# Patient Record
Sex: FEMALE | Race: WHITE | Employment: FULL TIME | URBAN - METROPOLITAN AREA
[De-identification: names, ages, dates, MRNs, and addresses within clinical notes are randomized per-mention and may not be internally consistent; named-entity substitution may affect disease eponyms.]

---

## 2018-05-15 ENCOUNTER — TRANSFERRED RECORDS (OUTPATIENT)
Dept: HEALTH INFORMATION MANAGEMENT | Facility: CLINIC | Age: 23
End: 2018-05-15

## 2018-05-15 LAB — PAP SMEAR - HIM PATIENT REPORTED: NEGATIVE

## 2020-04-11 ENCOUNTER — HOSPITAL ENCOUNTER (EMERGENCY)
Facility: CLINIC | Age: 25
Discharge: HOME | End: 2020-04-11
Attending: EMERGENCY MEDICINE | Admitting: EMERGENCY MEDICINE

## 2020-04-11 VITALS
WEIGHT: 140 LBS | OXYGEN SATURATION: 96 % | HEART RATE: 87 BPM | DIASTOLIC BLOOD PRESSURE: 76 MMHG | TEMPERATURE: 98.3 F | RESPIRATION RATE: 16 BRPM | SYSTOLIC BLOOD PRESSURE: 107 MMHG

## 2020-04-11 DIAGNOSIS — N10 ACUTE PYELONEPHRITIS: ICD-10-CM

## 2020-04-11 LAB
ALBUMIN UR-MCNC: 20 MG/DL
ANION GAP SERPL CALCULATED.3IONS-SCNC: 4 MMOL/L (ref 3–14)
APPEARANCE UR: ABNORMAL
B-HCG FREE SERPL-ACNC: <5 IU/L
BACTERIA #/AREA URNS HPF: ABNORMAL /HPF
BASOPHILS # BLD AUTO: 0 10E9/L (ref 0–0.2)
BASOPHILS NFR BLD AUTO: 0.2 %
BILIRUB UR QL STRIP: NEGATIVE
BUN SERPL-MCNC: 13 MG/DL (ref 7–30)
CALCIUM SERPL-MCNC: 8.5 MG/DL (ref 8.5–10.1)
CHLORIDE SERPL-SCNC: 108 MMOL/L (ref 94–109)
CO2 SERPL-SCNC: 25 MMOL/L (ref 20–32)
COLOR UR AUTO: YELLOW
CREAT SERPL-MCNC: 0.74 MG/DL (ref 0.52–1.04)
DIFFERENTIAL METHOD BLD: ABNORMAL
EOSINOPHIL # BLD AUTO: 0 10E9/L (ref 0–0.7)
EOSINOPHIL NFR BLD AUTO: 0.1 %
ERYTHROCYTE [DISTWIDTH] IN BLOOD BY AUTOMATED COUNT: 12 % (ref 10–15)
GFR SERPL CREATININE-BSD FRML MDRD: >90 ML/MIN/{1.73_M2}
GLUCOSE SERPL-MCNC: 119 MG/DL (ref 70–99)
GLUCOSE UR STRIP-MCNC: NEGATIVE MG/DL
HCT VFR BLD AUTO: 38.8 % (ref 35–47)
HGB BLD-MCNC: 12.8 G/DL (ref 11.7–15.7)
HGB UR QL STRIP: ABNORMAL
IMM GRANULOCYTES # BLD: 0 10E9/L (ref 0–0.4)
IMM GRANULOCYTES NFR BLD: 0.4 %
KETONES UR STRIP-MCNC: NEGATIVE MG/DL
LEUKOCYTE ESTERASE UR QL STRIP: ABNORMAL
LYMPHOCYTES # BLD AUTO: 0.7 10E9/L (ref 0.8–5.3)
LYMPHOCYTES NFR BLD AUTO: 7.8 %
MCH RBC QN AUTO: 32.1 PG (ref 26.5–33)
MCHC RBC AUTO-ENTMCNC: 33 G/DL (ref 31.5–36.5)
MCV RBC AUTO: 97 FL (ref 78–100)
MONOCYTES # BLD AUTO: 1 10E9/L (ref 0–1.3)
MONOCYTES NFR BLD AUTO: 11.8 %
MUCOUS THREADS #/AREA URNS LPF: PRESENT /LPF
NEUTROPHILS # BLD AUTO: 6.8 10E9/L (ref 1.6–8.3)
NEUTROPHILS NFR BLD AUTO: 79.7 %
NITRATE UR QL: NEGATIVE
NRBC # BLD AUTO: 0 10*3/UL
NRBC BLD AUTO-RTO: 0 /100
PH UR STRIP: 5.5 PH (ref 5–7)
PLATELET # BLD AUTO: 205 10E9/L (ref 150–450)
POTASSIUM SERPL-SCNC: 4.1 MMOL/L (ref 3.4–5.3)
RBC # BLD AUTO: 3.99 10E12/L (ref 3.8–5.2)
RBC #/AREA URNS AUTO: 23 /HPF (ref 0–2)
SODIUM SERPL-SCNC: 137 MMOL/L (ref 133–144)
SOURCE: ABNORMAL
SP GR UR STRIP: 1.02 (ref 1–1.03)
SQUAMOUS #/AREA URNS AUTO: 1 /HPF (ref 0–1)
TRANS CELLS #/AREA URNS HPF: 8 /HPF (ref 0–1)
UROBILINOGEN UR STRIP-MCNC: 2 MG/DL (ref 0–2)
WBC # BLD AUTO: 8.5 10E9/L (ref 4–11)
WBC #/AREA URNS AUTO: 138 /HPF (ref 0–5)

## 2020-04-11 PROCEDURE — 87086 URINE CULTURE/COLONY COUNT: CPT | Performed by: EMERGENCY MEDICINE

## 2020-04-11 PROCEDURE — 80048 BASIC METABOLIC PNL TOTAL CA: CPT | Performed by: EMERGENCY MEDICINE

## 2020-04-11 PROCEDURE — 99283 EMERGENCY DEPT VISIT LOW MDM: CPT

## 2020-04-11 PROCEDURE — 81001 URINALYSIS AUTO W/SCOPE: CPT | Performed by: EMERGENCY MEDICINE

## 2020-04-11 PROCEDURE — 85025 COMPLETE CBC W/AUTO DIFF WBC: CPT | Performed by: EMERGENCY MEDICINE

## 2020-04-11 PROCEDURE — 87186 SC STD MICRODIL/AGAR DIL: CPT | Performed by: EMERGENCY MEDICINE

## 2020-04-11 PROCEDURE — 84702 CHORIONIC GONADOTROPIN TEST: CPT

## 2020-04-11 PROCEDURE — 87088 URINE BACTERIA CULTURE: CPT | Performed by: EMERGENCY MEDICINE

## 2020-04-11 RX ORDER — CEPHALEXIN 500 MG/1
500 CAPSULE ORAL 4 TIMES DAILY
Qty: 28 CAPSULE | Refills: 0 | Status: SHIPPED | OUTPATIENT
Start: 2020-04-11 | End: 2020-04-18

## 2020-04-11 ASSESSMENT — ENCOUNTER SYMPTOMS
CHILLS: 1
DYSURIA: 0
ABDOMINAL PAIN: 0
FLANK PAIN: 1
SHORTNESS OF BREATH: 0
FEVER: 0
COUGH: 0
FREQUENCY: 1
VOMITING: 0
NAUSEA: 1

## 2020-04-11 NOTE — ED AVS SNAPSHOT
[facility] Emergency [facility]  [address]  [facility] [address]  Phone:  [phone]  Fax:  [phone]                                    Pooja High   MRN: [id number]    Department:  [facility] Emergency Department   Date of Visit:  4/11/2020           After Visit Summary Signature Page    I have received my discharge instructions, and my questions have been answered. I have discussed any challenges I see with this plan with the nurse or doctor.    ..........................................................................................................................................  Patient/Patient Representative Signature      ..........................................................................................................................................  Patient Representative Print Name and Relationship to Patient    ..................................................               ................................................  Date                                   Time    ..........................................................................................................................................  Reviewed by Signature/Title    ...................................................              ..............................................  Date                                               Time          22EPIC Rev 08/18

## 2020-04-12 NOTE — ED TRIAGE NOTES
2-3 days of R flank pain. Today started feeling chilled and slight nausea, no vomiting. Temp 99. No dysuria but having some frequency. Had a kidney infection about 9 years and feels similar. Denies abdominal pain, SOB, cough, fever.
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
## 2020-04-13 LAB
BACTERIA SPEC CULT: ABNORMAL
Lab: ABNORMAL
SPECIMEN SOURCE: ABNORMAL

## 2020-04-15 ENCOUNTER — TELEPHONE (OUTPATIENT)
Dept: EMERGENCY MEDICINE | Facility: CLINIC | Age: 25
End: 2020-04-15

## 2020-04-15 NOTE — TELEPHONE ENCOUNTER
[facility] Emergency Department/Urgent Care Lab result notification [Positive for uti and bacteria is susceptible to antibiotic]:    Reason for call:   Notify of Final urine culture result, confirm patient is taking antibiotic, assess symptoms, and advise per Emergency Dept/Urgent Care discharge instructions and Emergency Dept urine culture protocol.    Lab Result & Date of Final Report [copied from Result Note]:    Final Urine Culture Report on 4/13/2020   Emergency Dept/Urgent Care discharge antibiotic prescribed: Cephalexin (Keflex) 500 mg capsule, 1 capsule (500 mg) by mouth 4 times daily for 7 days.   #1. Bacteria, 50,000 to 100,000 colonies/mL Escherichia coli , is SUSCEPTIBLE to Antibiotic.     As per [facility] ED Lab Result protocol, no change in antibiotic therapy.       Left voicemail message requesting a call back to [phone] between 10 a.m. and 6:30 p.m. for patient's ED/UC lab results.     Frieda Cevallos RN    [facility]   Lung Nodule and ED Lab Results F/U RN  Epic pool (ED late result f/u RN) : [phone] [id number]   # [phone]

## 2020-09-22 ENCOUNTER — OFFICE VISIT (OUTPATIENT)
Dept: FAMILY MEDICINE | Facility: CLINIC | Age: 25
End: 2020-09-22

## 2020-09-22 VITALS
OXYGEN SATURATION: 99 % | HEART RATE: 75 BPM | BODY MASS INDEX: 28.89 KG/M2 | TEMPERATURE: 98.7 F | WEIGHT: 169.2 LBS | RESPIRATION RATE: 18 BRPM | SYSTOLIC BLOOD PRESSURE: 108 MMHG | HEIGHT: 64 IN | DIASTOLIC BLOOD PRESSURE: 78 MMHG

## 2020-09-22 DIAGNOSIS — F41.1 GAD (GENERALIZED ANXIETY DISORDER): ICD-10-CM

## 2020-09-22 DIAGNOSIS — F33.1 MODERATE EPISODE OF RECURRENT MAJOR DEPRESSIVE DISORDER (H): ICD-10-CM

## 2020-09-22 DIAGNOSIS — D17.30 LIPOMA OF SKIN AND SUBCUTANEOUS TISSUE: ICD-10-CM

## 2020-09-22 DIAGNOSIS — Z01.411 ENCOUNTER FOR GYNECOLOGICAL EXAMINATION WITH ABNORMAL FINDING: Primary | ICD-10-CM

## 2020-09-22 DIAGNOSIS — F41.0 PANIC ATTACK: ICD-10-CM

## 2020-09-22 DIAGNOSIS — Z76.89 HEALTH CARE HOME: ICD-10-CM

## 2020-09-22 DIAGNOSIS — Z71.89 ACP (ADVANCE CARE PLANNING): ICD-10-CM

## 2020-09-22 PROBLEM — F41.9 ANXIETY: Status: ACTIVE | Noted: 2019-10-17

## 2020-09-22 LAB
% GRANULOCYTES: 55.7 %
HCT VFR BLD AUTO: 38.7 % (ref 35–47)
HEMOGLOBIN: 12.8 G/DL (ref 11.7–15.7)
LYMPHOCYTES NFR BLD AUTO: 35.4 %
MCH RBC QN AUTO: 32.7 PG (ref 26–33)
MCHC RBC AUTO-ENTMCNC: 33.1 G/DL (ref 31–36)
MCV RBC AUTO: 98.8 FL (ref 78–100)
MONOCYTES NFR BLD AUTO: 8.9 %
PLATELET COUNT - QUEST: 216 10^9/L (ref 150–375)
RBC # BLD AUTO: 3.92 10*12/L (ref 3.8–5.2)
WBC # BLD AUTO: 6.4 10*9/L (ref 4–11)

## 2020-09-22 PROCEDURE — 85025 COMPLETE CBC W/AUTO DIFF WBC: CPT | Performed by: FAMILY MEDICINE

## 2020-09-22 PROCEDURE — 99213 OFFICE O/P EST LOW 20 MIN: CPT | Mod: 25 | Performed by: FAMILY MEDICINE

## 2020-09-22 PROCEDURE — 99385 PREV VISIT NEW AGE 18-39: CPT | Performed by: FAMILY MEDICINE

## 2020-09-22 PROCEDURE — 36415 COLL VENOUS BLD VENIPUNCTURE: CPT | Performed by: FAMILY MEDICINE

## 2020-09-22 RX ORDER — BUSPIRONE HYDROCHLORIDE 15 MG/1
15 TABLET ORAL 2 TIMES DAILY
Qty: 180 TABLET | Refills: 0 | Status: SHIPPED | OUTPATIENT
Start: 2020-09-22 | End: 2021-07-16

## 2020-09-22 RX ORDER — BUSPIRONE HYDROCHLORIDE 15 MG/1
15 TABLET ORAL 2 TIMES DAILY
COMMUNITY
Start: 2020-02-19 | End: 2020-09-22

## 2020-09-22 RX ORDER — HYDROXYZINE PAMOATE 25 MG/1
25 CAPSULE ORAL AT BEDTIME
COMMUNITY
Start: 2020-07-16 | End: 2020-09-22

## 2020-09-22 RX ORDER — CITALOPRAM HYDROBROMIDE 20 MG/1
20 TABLET ORAL DAILY
COMMUNITY
Start: 2020-09-11 | End: 2020-09-22

## 2020-09-22 RX ORDER — HYDROXYZINE PAMOATE 25 MG/1
25 CAPSULE ORAL AT BEDTIME
Qty: 30 CAPSULE | Refills: 0 | Status: SHIPPED | OUTPATIENT
Start: 2020-09-22 | End: 2021-05-11

## 2020-09-22 RX ORDER — CITALOPRAM HYDROBROMIDE 20 MG/1
20 TABLET ORAL DAILY
Qty: 90 TABLET | Refills: 0 | Status: SHIPPED | OUTPATIENT
Start: 2020-09-22 | End: 2021-04-12

## 2020-09-22 SDOH — HEALTH STABILITY: MENTAL HEALTH: HOW OFTEN DO YOU HAVE A DRINK CONTAINING ALCOHOL?: 4 OR MORE TIMES A WEEK

## 2020-09-22 SDOH — HEALTH STABILITY: MENTAL HEALTH: HOW MANY STANDARD DRINKS CONTAINING ALCOHOL DO YOU HAVE ON A TYPICAL DAY?: 1 OR 2

## 2020-09-22 SDOH — HEALTH STABILITY: MENTAL HEALTH: HOW OFTEN DO YOU HAVE 6 OR MORE DRINKS ON ONE OCCASION?: LESS THAN MONTHLY

## 2020-09-22 ASSESSMENT — ANXIETY QUESTIONNAIRES
IF YOU CHECKED OFF ANY PROBLEMS ON THIS QUESTIONNAIRE, HOW DIFFICULT HAVE THESE PROBLEMS MADE IT FOR YOU TO DO YOUR WORK, TAKE CARE OF THINGS AT HOME, OR GET ALONG WITH OTHER PEOPLE: VERY DIFFICULT
5. BEING SO RESTLESS THAT IT IS HARD TO SIT STILL: MORE THAN HALF THE DAYS
6. BECOMING EASILY ANNOYED OR IRRITABLE: MORE THAN HALF THE DAYS
GAD7 TOTAL SCORE: 14
3. WORRYING TOO MUCH ABOUT DIFFERENT THINGS: MORE THAN HALF THE DAYS
7. FEELING AFRAID AS IF SOMETHING AWFUL MIGHT HAPPEN: MORE THAN HALF THE DAYS
1. FEELING NERVOUS, ANXIOUS, OR ON EDGE: MORE THAN HALF THE DAYS
2. NOT BEING ABLE TO STOP OR CONTROL WORRYING: MORE THAN HALF THE DAYS

## 2020-09-22 ASSESSMENT — PATIENT HEALTH QUESTIONNAIRE - PHQ9
SUM OF ALL RESPONSES TO PHQ QUESTIONS 1-9: 13
5. POOR APPETITE OR OVEREATING: MORE THAN HALF THE DAYS

## 2020-09-22 ASSESSMENT — MIFFLIN-ST. JEOR: SCORE: 1479.55

## 2020-09-22 NOTE — NURSING NOTE
Patient is here for a full physical exam and establish care.    Pre-Visit Screening :  Immunizations : up to date  Colon Screening : NA  Mammogram: NA  Asthma Action Test/Plan : NA  PHQ9 :  Done today  GAD7 :  Done today  Patient's  BMI Body mass index is 29.5 kg/m .  Questioned patient about current smoking habits.  Pt. has never smoked.  OK to leave a detailed voice message regarding today's visit Yes, phone # [phone]      ETOH screening: addressed in history

## 2020-09-22 NOTE — PATIENT INSTRUCTIONS
Read handout  Schedule surgical consultation        Exercise encouraged  Flu shot recommended  Hemoglobin obtained  Referral to Psychiatry  Refills given  Routine breast self-exam encouraged  Seat belt use encouraged  Sunscreen recommended
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
## 2020-09-23 ASSESSMENT — ANXIETY QUESTIONNAIRES: GAD7 TOTAL SCORE: 14

## 2021-03-21 ENCOUNTER — HEALTH MAINTENANCE LETTER (OUTPATIENT)
Age: 26
End: 2021-03-21

## 2021-03-29 ENCOUNTER — OFFICE VISIT (OUTPATIENT)
Dept: INTERNAL MEDICINE | Facility: CLINIC | Age: 26
End: 2021-03-29
Payer: COMMERCIAL

## 2021-03-29 VITALS
BODY MASS INDEX: 30.34 KG/M2 | HEIGHT: 64 IN | SYSTOLIC BLOOD PRESSURE: 118 MMHG | TEMPERATURE: 98.7 F | RESPIRATION RATE: 18 BRPM | WEIGHT: 177.7 LBS | HEART RATE: 101 BPM | OXYGEN SATURATION: 99 % | DIASTOLIC BLOOD PRESSURE: 79 MMHG

## 2021-03-29 DIAGNOSIS — F33.1 MODERATE EPISODE OF RECURRENT MAJOR DEPRESSIVE DISORDER (H): ICD-10-CM

## 2021-03-29 DIAGNOSIS — F41.1 GAD (GENERALIZED ANXIETY DISORDER): ICD-10-CM

## 2021-03-29 PROCEDURE — 99203 OFFICE O/P NEW LOW 30 MIN: CPT | Performed by: INTERNAL MEDICINE

## 2021-03-29 RX ORDER — MULTIPLE VITAMINS W/ MINERALS TAB 9MG-400MCG
1 TAB ORAL DAILY
COMMUNITY

## 2021-03-29 RX ORDER — CITALOPRAM HYDROBROMIDE 40 MG/1
40 TABLET ORAL DAILY
Qty: 30 TABLET | Refills: 1 | Status: SHIPPED | OUTPATIENT
Start: 2021-03-29 | End: 2021-05-11

## 2021-03-29 ASSESSMENT — ANXIETY QUESTIONNAIRES
6. BECOMING EASILY ANNOYED OR IRRITABLE: NEARLY EVERY DAY
7. FEELING AFRAID AS IF SOMETHING AWFUL MIGHT HAPPEN: MORE THAN HALF THE DAYS
2. NOT BEING ABLE TO STOP OR CONTROL WORRYING: NEARLY EVERY DAY
IF YOU CHECKED OFF ANY PROBLEMS ON THIS QUESTIONNAIRE, HOW DIFFICULT HAVE THESE PROBLEMS MADE IT FOR YOU TO DO YOUR WORK, TAKE CARE OF THINGS AT HOME, OR GET ALONG WITH OTHER PEOPLE: SOMEWHAT DIFFICULT
GAD7 TOTAL SCORE: 16
1. FEELING NERVOUS, ANXIOUS, OR ON EDGE: NEARLY EVERY DAY
3. WORRYING TOO MUCH ABOUT DIFFERENT THINGS: MORE THAN HALF THE DAYS
5. BEING SO RESTLESS THAT IT IS HARD TO SIT STILL: SEVERAL DAYS

## 2021-03-29 ASSESSMENT — MIFFLIN-ST. JEOR: SCORE: 1518.1

## 2021-03-29 ASSESSMENT — PATIENT HEALTH QUESTIONNAIRE - PHQ9
5. POOR APPETITE OR OVEREATING: MORE THAN HALF THE DAYS
SUM OF ALL RESPONSES TO PHQ QUESTIONS 1-9: 18

## 2021-03-29 NOTE — PROGRESS NOTES
Assessment & Plan     Moderate episode of recurrent major depressive disorder (H)  She has been on a couple different medications without optimal results.  Recommend referral to our psychiatric nurse practitioner to get her medications managed better, for now we will refill her current medication so she avoids withdrawal.  I will also refer for counseling.  She will call if any significant worsening in symptoms.  - MENTAL HEALTH REFERRAL  - Adult; Psychiatry; Psychiatry; [facility]/Bridge to Long-Term Psychiatry as indicated ([phone]); Yes; Several Medications tried and failed; Yes; We will contact you to schedule the a...  - citalopram (CELEXA) 40 MG tablet; Take 1 tablet (40 mg) by mouth daily    ALICE (generalized anxiety disorder)  As above  - MENTAL HEALTH REFERRAL  - Adult; Psychiatry; Psychiatry; G: [facility]/Bridge to Long-Term Psychiatry as indicated ([phone]); Yes; Several Medications tried and failed; Yes; We will contact you to schedule the a...  - citalopram (CELEXA) 40 MG tablet; Take 1 tablet (40 mg) by mouth daily      30 minutes spent on the date of the encounter doing chart review, history and exam, documentation and further activities as noted above including contacting the clinic therapist to arrange follow-up therapy         Depression Screening Follow Up    PHQ 3/29/2021   PHQ-9 Total Score 18   Q9: Thoughts of better off dead/self-harm past 2 weeks Several days             Return in about 3 months (around 6/29/2021) for Physical Exam.    Jessica Crews MD  [facility]    Emilee Patton is a 25 year old who presents for the following health issues     HPI     She is a new patient who presents for establishment of care for her depression and anxiety.  She has had mood issues for many many years.  This was first diagnosed in 2011, was treated with Wellbutrin.  Initially she did well but then  she has a very flat affect with it.  She went off it for pregnancy.  She was put on citalopram a little bit over a year ago, the dose was increased last fall.  She still feels significant depression and anxiety symptoms despite the increased dose.  She is also on BuSpar.  She takes hydroxyzine at night and occasionally during the day for bad anxiety attacks.  She has a lot of sleep problems, hard to get to sleep as well as awakening during the night and it is hard to get back to sleep.  She gets very restless when sleeping and does have some restless leg symptoms.  She expressed some concerns that she occasionally feels if she would be better off dead but does not feel she would do it and hurt herself.    She does not have other health problems or concerns today.  She has a Mirena in place for about 2 years.  She had her last Pap in about May or June 2018    Patient Active Problem List   Diagnosis     Anxiety     Moderate episode of recurrent major depressive disorder (H)     ACP (advance care planning)     Health Care Home     ALICE (generalized anxiety disorder)     Panic attack     Current Outpatient Medications   Medication Sig Dispense Refill     busPIRone (BUSPAR) 15 MG tablet Take 1 tablet (15 mg) by mouth 2 times daily 180 tablet 0     Cholecalciferol (VITAMIN D-3 PO)        citalopram (CELEXA) 20 MG tablet Take 1 tablet (20 mg) by mouth daily (Patient taking differently: Take 40 mg by mouth daily ) 90 tablet 0     citalopram (CELEXA) 40 MG tablet Take 1 tablet (40 mg) by mouth daily 30 tablet 1     Fexofenadine HCl (ALLERGY 24-HR PO)        hydrOXYzine (VISTARIL) 25 MG capsule Take 1 capsule (25 mg) by mouth At Bedtime 30 capsule 0     levonorgestrel (MIRENA) 20 MCG/24HR IUD 1 Device by Intrauterine route       Meclizine HCl (ANTIVERT PO)        multivitamin w/minerals (MULTI-VITAMIN) tablet Take 1 tablet by mouth daily          Review of Systems   negative      Objective    /79 (BP Location: Right arm,  "Patient Position: Sitting, Cuff Size: Adult Regular)   Pulse 101   Temp 98.7  F (37.1  C) (Oral)   Resp 18   Ht 1.613 m (5' 3.5\")   Wt 80.6 kg (177 lb 11.2 oz)   SpO2 99%   BMI 30.98 kg/m    Body mass index is 30.98 kg/m .  Physical Exam       PHQ 9/22/2020 3/29/2021   PHQ-9 Total Score 13 18   Q9: Thoughts of better off dead/self-harm past 2 weeks Not at all Several days     ALICE-7 SCORE 9/22/2020 3/29/2021   Total Score 14 16                   "

## 2021-03-29 NOTE — Clinical Note
Please abstract the following data from this visit with this patient into the appropriate field in Epic:    Tests that can be patient reported without a hard copy:    Pap smear done on this date 5/15/18 (approximately), by this group: [facility], results were normal.

## 2021-03-30 ASSESSMENT — ANXIETY QUESTIONNAIRES: GAD7 TOTAL SCORE: 16

## 2021-04-12 ENCOUNTER — TELEPHONE (OUTPATIENT)
Dept: BEHAVIORAL HEALTH | Facility: CLINIC | Age: 26
End: 2021-04-12

## 2021-04-12 ENCOUNTER — VIRTUAL VISIT (OUTPATIENT)
Dept: PSYCHOLOGY | Facility: CLINIC | Age: 26
End: 2021-04-12
Payer: COMMERCIAL

## 2021-04-12 ENCOUNTER — VIRTUAL VISIT (OUTPATIENT)
Dept: PSYCHIATRY | Facility: CLINIC | Age: 26
End: 2021-04-12
Attending: INTERNAL MEDICINE
Payer: COMMERCIAL

## 2021-04-12 DIAGNOSIS — F41.1 GAD (GENERALIZED ANXIETY DISORDER): ICD-10-CM

## 2021-04-12 DIAGNOSIS — F41.0 PANIC ATTACK: ICD-10-CM

## 2021-04-12 DIAGNOSIS — Z86.59 HX OF MAJOR DEPRESSION: ICD-10-CM

## 2021-04-12 DIAGNOSIS — R41.840 ATTENTION DEFICIT: Primary | ICD-10-CM

## 2021-04-12 DIAGNOSIS — F33.1 MODERATE EPISODE OF RECURRENT MAJOR DEPRESSIVE DISORDER (H): ICD-10-CM

## 2021-04-12 DIAGNOSIS — F41.9 ANXIETY: Primary | ICD-10-CM

## 2021-04-12 PROCEDURE — 99204 OFFICE O/P NEW MOD 45 MIN: CPT | Mod: GT | Performed by: PSYCHIATRY & NEUROLOGY

## 2021-04-12 PROCEDURE — 90791 PSYCH DIAGNOSTIC EVALUATION: CPT | Mod: GT | Performed by: PSYCHOLOGIST

## 2021-04-12 RX ORDER — BUPROPION HYDROCHLORIDE 100 MG/1
100 TABLET, EXTENDED RELEASE ORAL DAILY
Qty: 30 TABLET | Refills: 1 | Status: SHIPPED | OUTPATIENT
Start: 2021-04-12 | End: 2021-05-11

## 2021-04-12 SDOH — SOCIAL STABILITY: SOCIAL INSECURITY
WITHIN THE LAST YEAR, HAVE YOU BEEN KICKED, HIT, SLAPPED, OR OTHERWISE PHYSICALLY HURT BY YOUR PARTNER OR EX-PARTNER?: NO

## 2021-04-12 SDOH — SOCIAL STABILITY: SOCIAL INSECURITY: WITHIN THE LAST YEAR, HAVE YOU BEEN HUMILIATED OR EMOTIONALLY ABUSED IN OTHER WAYS BY YOUR PARTNER OR EX-PARTNER?: NO

## 2021-04-12 SDOH — SOCIAL STABILITY: SOCIAL NETWORK: HOW OFTEN DO YOU ATTEND CHURCH OR RELIGIOUS SERVICES?: NOT ASKED

## 2021-04-12 SDOH — SOCIAL STABILITY: SOCIAL NETWORK: IN A TYPICAL WEEK, HOW MANY TIMES DO YOU TALK ON THE PHONE WITH FAMILY, FRIENDS, OR NEIGHBORS?: NOT ASKED

## 2021-04-12 SDOH — SOCIAL STABILITY: SOCIAL NETWORK: HOW OFTEN DO YOU ATTENT MEETINGS OF THE CLUB OR ORGANIZATION YOU BELONG TO?: NOT ASKED

## 2021-04-12 SDOH — SOCIAL STABILITY: SOCIAL INSECURITY: WITHIN THE LAST YEAR, HAVE YOU BEEN AFRAID OF YOUR PARTNER OR EX-PARTNER?: NO

## 2021-04-12 SDOH — HEALTH STABILITY: PHYSICAL HEALTH: ON AVERAGE, HOW MANY MINUTES DO YOU ENGAGE IN EXERCISE AT THIS LEVEL?: 0 MIN

## 2021-04-12 SDOH — SOCIAL STABILITY: SOCIAL NETWORK: HOW OFTEN DO YOU GET TOGETHER WITH FRIENDS OR RELATIVES?: NOT ASKED

## 2021-04-12 SDOH — ECONOMIC STABILITY: FOOD INSECURITY: WITHIN THE PAST 12 MONTHS, THE FOOD YOU BOUGHT JUST DIDN'T LAST AND YOU DIDN'T HAVE MONEY TO GET MORE.: NEVER TRUE

## 2021-04-12 SDOH — SOCIAL STABILITY: SOCIAL NETWORK
DO YOU BELONG TO ANY CLUBS OR ORGANIZATIONS SUCH AS CHURCH GROUPS UNIONS, FRATERNAL OR ATHLETIC GROUPS, OR SCHOOL GROUPS?: NOT ASKED

## 2021-04-12 SDOH — SOCIAL STABILITY: SOCIAL NETWORK: ARE YOU MARRIED, WIDOWED, DIVORCED, SEPARATED, NEVER MARRIED, OR LIVING WITH A PARTNER?: DIVORCED

## 2021-04-12 SDOH — ECONOMIC STABILITY: FOOD INSECURITY: WITHIN THE PAST 12 MONTHS, YOU WORRIED THAT YOUR FOOD WOULD RUN OUT BEFORE YOU GOT MONEY TO BUY MORE.: NEVER TRUE

## 2021-04-12 SDOH — ECONOMIC STABILITY: TRANSPORTATION INSECURITY
IN THE PAST 12 MONTHS, HAS LACK OF TRANSPORTATION KEPT YOU FROM MEETINGS, WORK, OR FROM GETTING THINGS NEEDED FOR DAILY LIVING?: NO

## 2021-04-12 SDOH — ECONOMIC STABILITY: INCOME INSECURITY: HOW HARD IS IT FOR YOU TO PAY FOR THE VERY BASICS LIKE FOOD, HOUSING, MEDICAL CARE, AND HEATING?: NOT HARD AT ALL

## 2021-04-12 SDOH — SOCIAL STABILITY: SOCIAL INSECURITY
WITHIN THE LAST YEAR, HAVE TO BEEN RAPED OR FORCED TO HAVE ANY KIND OF SEXUAL ACTIVITY BY YOUR PARTNER OR EX-PARTNER?: NO

## 2021-04-12 SDOH — HEALTH STABILITY: MENTAL HEALTH
STRESS IS WHEN SOMEONE FEELS TENSE, NERVOUS, ANXIOUS, OR CAN'T SLEEP AT NIGHT BECAUSE THEIR MIND IS TROUBLED. HOW STRESSED ARE YOU?: NOT ASKED

## 2021-04-12 SDOH — ECONOMIC STABILITY: TRANSPORTATION INSECURITY
IN THE PAST 12 MONTHS, HAS THE LACK OF TRANSPORTATION KEPT YOU FROM MEDICAL APPOINTMENTS OR FROM GETTING MEDICATIONS?: NO

## 2021-04-12 SDOH — HEALTH STABILITY: PHYSICAL HEALTH: ON AVERAGE, HOW MANY DAYS PER WEEK DO YOU ENGAGE IN MODERATE TO STRENUOUS EXERCISE (LIKE A BRISK WALK)?: 0 DAYS

## 2021-04-12 SDOH — HEALTH STABILITY: MENTAL HEALTH: HOW OFTEN DO YOU HAVE 6 OR MORE DRINKS ON ONE OCCASION?: NEVER

## 2021-04-12 ASSESSMENT — COLUMBIA-SUICIDE SEVERITY RATING SCALE - C-SSRS
5. HAVE YOU STARTED TO WORK OUT OR WORKED OUT THE DETAILS OF HOW TO KILL YOURSELF? DO YOU INTEND TO CARRY OUT THIS PLAN?: YES
4. HAVE YOU HAD THESE THOUGHTS AND HAD SOME INTENTION OF ACTING ON THEM?: NO
TOTAL  NUMBER OF INTERRUPTED ATTEMPTS PAST 3 MONTHS: NO
3. HAVE YOU BEEN THINKING ABOUT HOW YOU MIGHT KILL YOURSELF?: YES
4. HAVE YOU HAD THESE THOUGHTS AND HAD SOME INTENTION OF ACTING ON THEM?: NO
2. HAVE YOU ACTUALLY HAD ANY THOUGHTS OF KILLING YOURSELF LIFETIME?: YES
1. IN YOUR LIFETIME, HAVE YOU WISHED YOU WERE DEAD OR WISHED YOU COULD GO TO SLEEP AND NOT WAKE UP?: 2013
TOTAL  NUMBER OF ABORTED OR SELF INTERRUPTED ATTEMPTS PAST LIFETIME: NO
6. HAVE YOU EVER DONE ANYTHING, STARTED TO DO ANYTHING, OR PREPARED TO DO ANYTHING TO END YOUR LIFE?: NO
TOTAL  NUMBER OF INTERRUPTED ATTEMPTS PAST 3 MONTHS: 1
5. HAVE YOU STARTED TO WORK OUT OR WORKED OUT THE DETAILS OF HOW TO KILL YOURSELF? DO YOU INTEND TO CARRY OUT THIS PLAN?: NO
1. IN THE PAST MONTH, HAVE YOU WISHED YOU WERE DEAD OR WISHED YOU COULD GO TO SLEEP AND NOT WAKE UP?: NO
TOTAL  NUMBER OF INTERRUPTED ATTEMPTS LIFETIME: YES
1. IN THE PAST MONTH, HAVE YOU WISHED YOU WERE DEAD OR WISHED YOU COULD GO TO SLEEP AND NOT WAKE UP?: YES
6. HAVE YOU EVER DONE ANYTHING, STARTED TO DO ANYTHING, OR PREPARED TO DO ANYTHING TO END YOUR LIFE?: NO
ATTEMPT PAST THREE MONTHS: NO
ATTEMPT LIFETIME: NO
TOTAL  NUMBER OF ABORTED OR SELF INTERRUPTED ATTEMPTS PAST 3 MONTHS: NO
2. HAVE YOU ACTUALLY HAD ANY THOUGHTS OF KILLING YOURSELF?: NO

## 2021-04-12 ASSESSMENT — ANXIETY QUESTIONNAIRES
2. NOT BEING ABLE TO STOP OR CONTROL WORRYING: NEARLY EVERY DAY
5. BEING SO RESTLESS THAT IT IS HARD TO SIT STILL: MORE THAN HALF THE DAYS
7. FEELING AFRAID AS IF SOMETHING AWFUL MIGHT HAPPEN: SEVERAL DAYS
1. FEELING NERVOUS, ANXIOUS, OR ON EDGE: NEARLY EVERY DAY
GAD7 TOTAL SCORE: 17
4. TROUBLE RELAXING: NEARLY EVERY DAY
7. FEELING AFRAID AS IF SOMETHING AWFUL MIGHT HAPPEN: SEVERAL DAYS
6. BECOMING EASILY ANNOYED OR IRRITABLE: MORE THAN HALF THE DAYS
GAD7 TOTAL SCORE: 17
3. WORRYING TOO MUCH ABOUT DIFFERENT THINGS: NEARLY EVERY DAY
GAD7 TOTAL SCORE: 17
7. FEELING AFRAID AS IF SOMETHING AWFUL MIGHT HAPPEN: SEVERAL DAYS
2. NOT BEING ABLE TO STOP OR CONTROL WORRYING: NEARLY EVERY DAY
7. FEELING AFRAID AS IF SOMETHING AWFUL MIGHT HAPPEN: SEVERAL DAYS
5. BEING SO RESTLESS THAT IT IS HARD TO SIT STILL: MORE THAN HALF THE DAYS
GAD7 TOTAL SCORE: 17
GAD7 TOTAL SCORE: 17
1. FEELING NERVOUS, ANXIOUS, OR ON EDGE: NEARLY EVERY DAY
3. WORRYING TOO MUCH ABOUT DIFFERENT THINGS: NEARLY EVERY DAY
GAD7 TOTAL SCORE: 17
6. BECOMING EASILY ANNOYED OR IRRITABLE: MORE THAN HALF THE DAYS
4. TROUBLE RELAXING: NEARLY EVERY DAY

## 2021-04-12 ASSESSMENT — PATIENT HEALTH QUESTIONNAIRE - PHQ9
10. IF YOU CHECKED OFF ANY PROBLEMS, HOW DIFFICULT HAVE THESE PROBLEMS MADE IT FOR YOU TO DO YOUR WORK, TAKE CARE OF THINGS AT HOME, OR GET ALONG WITH OTHER PEOPLE: SOMEWHAT DIFFICULT
SUM OF ALL RESPONSES TO PHQ QUESTIONS 1-9: 18

## 2021-04-12 NOTE — Clinical Note
Please call this patient to get them scheduled for a follow-up visit in 4 weeks. Please schedule with me and the [facility]. Thanks!
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
## 2021-04-12 NOTE — PATIENT INSTRUCTIONS
Name: Pooja High  YOB: 1995  Date: April 12, 2021   My primary care provider: Jordana Ac  My primary care clinic: [facility]  My prescriber: Dr. Frederick  Other care team support:     My Triggers:  Relationship conflict hearing about other people's family problems and Work  difficulties       Additional People, Places, and Things that I can access for support: ex-, friend Dede         What is important to me and makes life worth living: son, kids at work.         GREEN    Good Control  1. I feel good  2. No suicidal thoughts   3. Can work, sleep and play      Action Steps  1. Self-care: balanced meals, exercising, sleep practices, etc.  2. Take your medications as prescribed.  3. Continue meetings with therapist and prescriber.  4.  Do the healthy things that I enjoy.           YELLOW  Getting Worse  I have ANY of these:  1. I do not feel good  2. Difficulty Concentrating  3. Sleep is changing  4. Increase/Change in my thoughts to hurt self and/or others, but I can still manage and not act on it.   5. Not taking care of self.             Action Steps (in addition to the above):  1. Inform your therapist and psychiatric prescriber/PCP.  2. Keep taking your medications as prescribed.    3. Turn to people you can ask for help.  4. Use internal coping strategies -see below.  5. Create safe environment: notify friends/family of increase in symptoms           RED  Get Help  If I have ANY of these:  1. Current and uncontrollable thoughts and/or behaviors to hurt self and/or others.   Actions to manage my safety  1. Contact your emergency person Dao or Dede  2. Call my crisis team- [facility] [phone]  3. Or Call 911 or go to the emergency room right away        My Internal Coping Strategies include the following:  belly breathing, color, use my coping skills and take a nap, take a walk     [End for Brief Safety Plan]     Safety Concerns  How To Identify Situations  That Make Your Mental Health Worse:  Triggers are things that make your mental health worse.  Look at the list below to help you find your triggers and what you can do about them.     1. Identify Early Warning Signs:    Sometimes symptoms return, even when people do their best to stay well. Symptoms can develop over a short period of time with little or no warning, but most of the time they emerge gradually over several weeks.  Early warning signs are changes that people experience when a relapse is starting. Some early warning signs are common and others are not as common.   Common Early Warning Signs:    Feeling tense or nervous, Eating less or eating more, Trouble sleeping -either too much or too little sleep, Feeling depressed or low, Feeling irritable, Feeling like not being around other people, Trouble concentrating and Urges to harm self     2. Identify action steps to take when warning signs are noticed:    Taking Action- It is important to take action if you are experiencing early warning signs of a relapse.  The faster you act, the more likely it is that you can avoid a full relapse.  It is helpful to identify several specific ways to cope with symptoms.      The following is my list of symptoms and coping strategies that I can use when they are present:    Symptom Coping Strategies   Anxiety -Talk with someone in your support system and let him or her know how you are feeling.  -Use relaxation techniques such as deep breathing or imagery.  -Use positive affirmations to counteract negative self-talk such as  I am learning to let go of worry.    Depression - Schedule your day; include activities you have to do and activities you enjoy doing.  - Get some exercise - walk, run, bike, or swim.  - Give yourself credit for even the smallest things you get done.   Sleep Difficulties   - Go to sleep at the same time every day.  - Do something relaxing before bed, such as drinking herbal tea or listening to  music.  - Avoid having discussions about upsetting topics before going to bed.   Delusions   - Distract yourself from the disturbing thought by doing something that requires your attention such as a puzzle.  - Check out your beliefs by talking to someone you trust.    Hallucinations   - Use headphones to listen to music.  - Tell voices to  stop  or say to yourself,  I am safe.   - Ignore the hallucinations as much as possible; focus on other things.   Concentration Difficulties - Minimize distractions so there is only one thing for you to focus on at a time.    - Ask the person you are having a conversation with to slow down or repeat things you are unsure of.

## 2021-04-12 NOTE — TELEPHONE ENCOUNTER
Left message for patient about needing to reschedule today's appointment. We believes that insurance will likely not cover both visits with myself and John today. Offered her Wednesday at 2pm gave her intake number to reschedule. John states that she is safe and safety plan is complete.
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
## 2021-04-12 NOTE — Clinical Note
Just FYI. No action needed.  Let me know if you have any questions or concerns.    -[facility]

## 2021-04-13 ASSESSMENT — PATIENT HEALTH QUESTIONNAIRE - PHQ9: SUM OF ALL RESPONSES TO PHQ QUESTIONS 1-9: 18

## 2021-04-13 ASSESSMENT — ANXIETY QUESTIONNAIRES
GAD7 TOTAL SCORE: 17
GAD7 TOTAL SCORE: 17

## 2021-04-14 ENCOUNTER — VIRTUAL VISIT (OUTPATIENT)
Dept: BEHAVIORAL HEALTH | Facility: CLINIC | Age: 26
End: 2021-04-14
Payer: COMMERCIAL

## 2021-04-14 DIAGNOSIS — F33.1 MODERATE EPISODE OF RECURRENT MAJOR DEPRESSIVE DISORDER (H): ICD-10-CM

## 2021-04-14 DIAGNOSIS — F41.1 GAD (GENERALIZED ANXIETY DISORDER): Primary | ICD-10-CM

## 2021-04-14 DIAGNOSIS — F41.0 PANIC DISORDER WITHOUT AGORAPHOBIA: ICD-10-CM

## 2021-04-14 PROCEDURE — 90837 PSYTX W PT 60 MINUTES: CPT | Mod: GT | Performed by: MARRIAGE & FAMILY THERAPIST

## 2021-04-14 PROCEDURE — 90785 PSYTX COMPLEX INTERACTIVE: CPT | Mod: GT | Performed by: MARRIAGE & FAMILY THERAPIST

## 2021-04-14 ASSESSMENT — PATIENT HEALTH QUESTIONNAIRE - PHQ9
10. IF YOU CHECKED OFF ANY PROBLEMS, HOW DIFFICULT HAVE THESE PROBLEMS MADE IT FOR YOU TO DO YOUR WORK, TAKE CARE OF THINGS AT HOME, OR GET ALONG WITH OTHER PEOPLE: SOMEWHAT DIFFICULT
SUM OF ALL RESPONSES TO PHQ QUESTIONS 1-9: 18
SUM OF ALL RESPONSES TO PHQ QUESTIONS 1-9: 18

## 2021-04-14 NOTE — PROGRESS NOTES
[facility] Primary Care: Integrated Behavioral Health  April 14, 2021  Telemedicine Visit: The patient's condition can be safely assessed and treated via synchronous audio and visual telemedicine encounter.      Reason for Telemedicine Visit: Services only offered telehealth    Originating Site (Patient Location): Patient's place of employment    Distant Site (Provider Location): [facility] [place]    Consent:  The patient/guardian has verbally consented to: the potential risks and benefits of telemedicine (video visit) versus in person care; bill my insurance or make self-payment for services provided; and responsibility for payment of non-covered services.     Mode of Communication:  Video Conference via [employer]    As the provider I attest to compliance with applicable laws and regulations related to telemedicine.      Behavioral Health Clinician Progress Note    Patient Name: Pooja High           Service Type:  Individual      Service Location:   Face to Face in Home / Community     Session Start Time: 2:00 pm  Session End Time: 3:00pm      Session Length: 53 - 60      Attendees: Client    Visit Activities (Refresh list every visit): [facility] Only    Diagnostic Assessment Date: John Corona on 4/12/21  Treatment Plan Review Date: n/a  See Flowsheets for today's PHQ-9 and ALICE-7 results  Previous PHQ-9:   PHQ-9 SCORE 4/12/2021 4/12/2021 4/14/2021   PHQ-9 Total Score MyChart 18 (Moderately severe depression) 18 (Moderately severe depression) 18 (Moderately severe depression)   PHQ-9 Total Score 18 18 18     Previous ALICE-7:   ALICE-7 SCORE 4/12/2021 4/12/2021 4/12/2021   Total Score 17 (severe anxiety) 17 (severe anxiety) 17 (severe anxiety)   Total Score 17 17 17       TOM LEVEL:  No flowsheet data found.    DATA  Extended Session (60+ minutes): No  Interactive Complexity: No  -The need to manage maladaptive communication (related to, e.g., high anxiety, high reactivity, repeated  questions, or disagreement) among participants that complicates delivery of care  Crisis: Yes, visit entailed Crisis Management / Stabilization requiring urgent assessment and history of the crisis state, mental status exam and disposition  [facility] Patient: No    Treatment Objective(s) Addressed in This Session:  Target Behavior(s): n/a    Depressed Mood: Increase interest, engagement, and pleasure in doing things  Decrease frequency and intensity of feeling down, depressed, hopeless  Improve quantity and quality of night time sleep / decrease daytime naps  Feel less tired and more energy during the day   Improve diet, appetite, mindful eating, and / or meal planning  Identify negative self-talk and behaviors: challenge core beliefs, myths, and actions  Improve concentration, focus, and mindfulness in daily activities   Feel less fidgety, restless or slow in daily activities / interpersonal interactions  Decrease thoughts that you'd be better off dead or of suicide / self-harm  Discuss motivation / ambivalence about taking anti-depressant / mood stabilizer medication(s)  Discuss motivation / ambivalence about a referral to specialty mental health services (Therapy, Day Tx, PHP)  Anxiety: will experience a reduction in anxiety, will develop more effective coping skills to manage anxiety symptoms, will develop healthy cognitive patterns and beliefs and will increase ability to function adaptively  Risk / Safety: will develop strategies for more effective management of risk issues and will follow safety plan (in EMR) for more effective management of risk issues    Current Stressors / Issues:  Pt was seen by John Corona on 4/12/21. Reviewed Dr. Corona's DA and screenings. Reviewed safety with Patient. Patient denies any current SI,plans or intentions. Patient does have self-harm thoughts. She has been scratching until she is bleeding. This has happened 4 times in the last few months. She states that she catches herself  "scratching every other day. She states that she has cut her nails and had been able to stop herself more. Pt has a history of suicidal thoughts in 2013. She had thoughts about slitting her wrist in 2013.  Pt told her friend and went to the hospital. Pt denied suicidal thoughts since then. \"I am safe. I am not having any current suicidal thoughts. Pt states that she can reach out to her friend Dede or her ex- Dao if anything changes. Pt states that she loves her job and her son. Pt is looking forward to her upcoming move. Patient agreed to make a safety plan. States that she can follow the safety plan. There are no guns in her home. Denies any drug or alcohol abuse.    Talked more about the scratching. Pt states that it started out as a nervous habits then the first time felt pain realized it was a release. \"It is not that I want to hurt my self. It is not feeling suicidal either. It is a release.\" Patient states that it is michelle better the last few days and hopes the medication increase will help. Patient states that she doesn't want to scratch and is hoping that therapy will help with her anxiety. SHe is hopeful that when her anxiety is down this will stop the scratching. Pt reported she scratches only when she is feeling overwhelmed. Normalized the self harm thought and talked about things to try when she wants to scratch. Will try Rubberband, changing body tempeture apps given to her in her safety plan.     Discussed items making patient anxious are: struggles at work and helping her son. They are working to help her son with school. He is getting tested for ADHD,Anxiety and/ or Autism. Patient is also trying to move due to neighbor struggles. Discussed CBT and ACTS therapy. Patient would also like to learn relaxation techniques. Roya made another appointment       Progress on Treatment Objective(s) / Homework:  n/a    Motivational Interviewing    MI Intervention: Expressed Empathy/Understanding, " Supported Autonomy, Collaboration, Evocation, Permission to raise concern or advise, Open-ended questions and Reflections: simple and complex     Change Talk Expressed by the Patient: Desire to change Ability to change Reasons to change    Provider Response to Change Talk: E - Evoked more info from patient about behavior change      Situation        Automatic Thoughts  Cognitive Distortions      Feelings        Behavior        Questioning Thoughts            Also provided psychoeducation about behavioral health condition, symptoms, and treatment options    Care Plan review completed: No    Medication Review:  Changes to psychiatric medications, see updated Medication List in EPIC.     Medication Compliance:  Yes    Changes in Health Issues:   None reported    Chemical Use Review:   Substance Use: Chemical use reviewed, no active concerns identified      Tobacco Use: No current tobacco use.      Assessment: Current Emotional / Mental Status (status of significant symptoms):  Risk status (Self / Other harm or suicidal ideation)  Patient has had a history of suicidal ideation: 2013 thoughts of slitting wrist and self-injurious behavior: scratching  Patient denies current fears or concerns for personal safety.  Patient denies current or recent suicidal ideation or behaviors.  Patient denies current or recent homicidal ideation or behaviors.  Patient reports current or recent self injurious behavior or ideation including scratching.  Patient denies other safety concerns.  A safety and risk management plan has been developed including: Patient consented to co-developed safety plan.  A safety and risk management plan was completed.  Patient agreed to use safety plan should any safety concerns arise.  A copy was given to the patient.  [place] Suicide Severity Rating Scale (Lifetime/Recent)  [place] Suicide Severity Rating (Lifetime/Recent) 4/12/2021   1. Wish to be Dead (Lifetime) Yes   Wish to be Dead Description  (Lifetime) 2013   1. Wish to be Dead (Recent) No   2. Non-Specific Active Suicidal Thoughts (Lifetime) Yes   2. Non-Specific Active Suicidal Thoughts (Recent) No   3. Active Suicidal Ideation with any Methods (Not Plan) Without Intent to Act (Lifetime) Yes   3. Active Suicidal Ideation with any Methods (Not Plan) Without Intent to Act (Recent) No   4. Active Suicidal Ideation with Some Intent to Act, Without Specific Plan (Lifetime) No   4. Active Suicidal Ideation with Some Intent to Act, Without Specific Plan (Recent) No   5. Active Suicidal Ideation with Specific Plan and Intent (Lifetime) Yes   Active Suicidal Ideation with Specific Plan and Intent Description (Lifetime) 2013 - had plan to slit wrists; told friend who took her to ER   5. Active Suicidal Ideation with Specific Plan and Intent (Recent) No   Most Severe Ideation Rating (Lifetime) 4   Frequency (Lifetime) 1   Duration (Lifetime) 3   Controllability (Lifetime) 4   Protective Factors  (Lifetime) 4   Most Severe Ideation Rating (Past Month) NA   Frequency (Past Month) NA   Duration (Past Month) NA   Controllability (Past Month) NA   Protective Factors (Past Month) NA   Reasons for Ideation (Past Month) NA   Actual Attempt (Lifetime) No   Actual Attempt (Past 3 Months) No   Has subject engaged in non-suicidal self-injurious behavior? (Lifetime) No   Has subject engaged in non-suicidal self-injurious behavior? (Past 3 Months) Yes   Interrupted Attempts (Lifetime) Yes   Interrupted Attempt Description (Lifetime) 2013 - had plan to slit wrists; told friend who took her to ER   Total Number of Interrupted Attempts (Lifetime) 1   Interrupted Attempts (Past 3 Months) No   Aborted or Self-Interrupted Attempt (Lifetime) No   Aborted or Self-Interrupted Attempt (Past 3 Months) No   Preparatory Acts or Behavior (Lifetime) No   Preparatory Acts or Behavior (Past 3 Months) No   Most Lethal Attempt Actual Lethality Code NA   Initial/First Attempt Actual Lethality  "Code NA       Appearance:   Appropriate   Eye Contact:   Good   Psychomotor Behavior: Normal   Attitude:   Cooperative   Orientation:   All  Speech   Rate / Production: Normal    Volume:  Normal   Mood:    Anxious   Affect:    Appropriate   Thought Content:  Clear   Thought Form:  Coherent  Logical   Insight:    Fair     Diagnoses:  1. ALICE (generalized anxiety disorder)    2. Moderate episode of recurrent major depressive disorder (H)    3. Panic disorder without agoraphobia        Collateral Reports Completed:  Communicated with: John Corona    Plan: (Homework, other):  Patient was given information about behavioral services and encouraged to schedule a follow up appointment with the clinic [facility] in 1 week, in 2 weeks.  She was also given information about mental health symptoms and treatment options  and Cognitive Behavioral Therapy skills to practice when experiencing anxiety and depression.  CD Recommendations: No indications of CD issues.  NEISHA Umanzor, [facility]      ______________________________________________________________________        Integrated Behavioral Health Services                                      Patient's Name: Pooja High  April 14, 2021    SAFETY PLAN:  Step 1: Warning signs / cues (Thoughts, images, mood, situation, behavior) that a crisis may be developing:    Thoughts: \"I don't matter\" and If I was not here it would not matter    Images: denied    Thinking Processes: ruminations (can't stop thinking about my problems): work , racing thoughts and highly critical and negative thoughts: about herself    Mood: worsening depression and intense worry    Behaviors: isolating/withdrawing , not taking care of myself, not sleeping enough and hard getting tasks done    Situations: relationship problems and hearing about others family members problems, and Work difficulties   Step 2: Coping strategies - Things I can do to take my mind off of my problems without contacting another person " "(relaxation technique, physical activity):    Distress Tolerance Strategies:  arts and crafts: coloring books, watch a funny movie: netflix, change body temperature (ice pack/cold water) , paced breathing/progressive muscle relaxation and Apps- Calm, Headspace and Calm  Try a rubber band on wrist.     Physical Activities: go for a walk, deep breathing and stretching     Focus on helpful thoughts:  remind myself of what is important to me: my son  Step 3: People and social settings that provide distraction:   Name: Dede  Phone: in her phone   Name: Dao Phone: in her [employer]   work and [employer]   Step 4: Remind myself of people and things that are important to me and worth living for:    My son and my kids at my job    Step 5: When I am in crisis, I can ask these people to help me use my safety plan:   Name: Dede   Phone: in phone   Name: Dao Phone: in phone     Step 6: Making the environment safe:     be around others  Step 7: Professionals or agencies I can contact during a crisis:    Suicide Prevention Lifeline: 2-879-120-KVNP (5603)    Crisis Text Line Service: Text   [place]  to 810093.  Local Crisis Services: If you are in immediate danger, call 911.  If you or someone you know is experiencing a mental health crisis and you need help, the following crisis  hotlines are available to help.    Suicide Prevention Lifeline: 2-827-602-RGXF (0283)    Crisis Text Line Service: Text \"[place]\" to 192266.    [facility] Emergency Department  Behavioral Emergency Center  [facility] [address]  [phone]  [facility]  [facility] Crisis Response Services  (Adults & Children)  [phone]  [facility] -  Acute Psychiatric Services (APS)  Assessment & Referral: [phone]  Suicide Hotline: 346.831.5078    [facility] Crisis Team  [phone]    [facility] Adult Mental Health " Services   [phone]  Referrals: [phone]  Crisis: [phone]    [facility]    Emergency Department  [address] [place] [address]  [phone]  [place] [employer]    4-837-EzvcGtm ([phone])  [facility] Crisis Response Unit    [phone]  [facility]  Community Outreach for Psychiatric Emergencies  [phone]  [facility] Crisis Services  [facility] Adult Mental Health Services - Crisis Services (24/7)  Information & Referrals: [phone]  Crisis Line: [phone]    [facility] (24/7)  [phone] TDD    Call 911 or go to my nearest emergency department.   I helped develop this safety plan and agree to use it when needed.  I have been given a copy of this plan.      Patient signature: ____verbally agreed. Sent by Summu[employer] Render__________________________________________________________  Today s date:  April 14, 2021  Adapted from Safety Plan Template 2008 Jess Bedoya and Cornelio Alvarez is reprinted with the express permission of the authors.  No portion of the Safety Plan Template may be reproduced without the express, written permission.  You can contact the authors at [email or web address] or [email or web address].

## 2021-04-14 NOTE — PATIENT INSTRUCTIONS
Treatment Plan:    Continue Celexa/citalopram 40 mg daily for mood and anxiety    Continue buspirone/BuSpar 15 mg twice daily for anxiety    Continue hydroxyzine 25-50 up to 3 times daily as needed for anxiety, sleep.    Start Wellbutrin  mg daily for mood and to augment Celexa/citalopram.  Might also be beneficial for focus/attention.    Referral placed for ADHD psychological testing.    Continue therapy as planned.    Continue all other cares per primary care provider.     Continue all other medications as reviewed per electronic medical record today.     Safety plan reviewed. To the Emergency Department as needed or call after hours crisis line at [phone] or [phone]. [place] Crisis Text Line: Text [place] to 943575  or  Suicide LifeLine Chat: [email or web address]/chat    Schedule an appointment with me in 4 weeks or sooner as needed.  Call [facility] at [phone] to schedule.    Follow up with primary care provider as planned or sooner if needed for acute medical concerns.    Call the psychiatric nurse line with medication questions or concerns at [phone].    MadBid.comt may be used to communicate with your provider, but this is not intended to be used for emergencies.    Patient Education:  Discussed therapy with Wellbutrin can lower the seizure threshold.  Patient does have history of concussion but no seizures, no eating disorders, does not use excessive alcohol.    Community Resources:    National Suicide Prevention Lifeline: 515.191.5603 (TTY: 959.353.7078). Call anytime for help.  (www.suicidepreventionlifeline.org)  National [place] on Mental Illness (www.miller.org): 936.595.5401 or 026-432-7027.   Mental Health Association (www.mentalhealth.org): 460.596.5916 or 036-867-7017.  [place] Crisis Text Line: Text [place] to 376045  Suicide LifeLine Chat: [employer][email or web address]/chat

## 2021-04-29 ENCOUNTER — VIRTUAL VISIT (OUTPATIENT)
Dept: BEHAVIORAL HEALTH | Facility: CLINIC | Age: 26
End: 2021-04-29
Payer: COMMERCIAL

## 2021-04-29 DIAGNOSIS — F41.0 PANIC DISORDER WITHOUT AGORAPHOBIA: ICD-10-CM

## 2021-04-29 DIAGNOSIS — F33.1 MODERATE EPISODE OF RECURRENT MAJOR DEPRESSIVE DISORDER (H): ICD-10-CM

## 2021-04-29 DIAGNOSIS — F41.1 GAD (GENERALIZED ANXIETY DISORDER): Primary | ICD-10-CM

## 2021-04-29 PROCEDURE — 90834 PSYTX W PT 45 MINUTES: CPT | Mod: GT | Performed by: MARRIAGE & FAMILY THERAPIST

## 2021-04-29 NOTE — PROGRESS NOTES
[facility]: Integrated Behavioral Health  April 29, 2021  Telemedicine Visit: The patient's condition can be safely assessed and treated via synchronous audio and visual telemedicine encounter.      Reason for Telemedicine Visit: Services only offered telehealth    Originating Site (Patient Location): Patient's place of employment    Distant Site (Provider Location): [facility]    Consent:  The patient/guardian has verbally consented to: the potential risks and benefits of telemedicine (video visit) versus in person care; bill my insurance or make self-payment for services provided; and responsibility for payment of non-covered services.     Mode of Communication:  Video Conference via [employer]    As the provider I attest to compliance with applicable laws and regulations related to telemedicine.      Behavioral Health Clinician Progress Note    Patient Name: Pooja High           Service Type:  Individual      Service Location:   Face to Face in Home / Community     Session Start Time: 1:06 pm  Session End Time: 1:58pm      Session Length: 38 - 52      Attendees: Client    Visit Activities (Refresh list every visit): [facility] Only    Diagnostic Assessment Date: John Corona on 4/12/21  Treatment Plan Review Date: n/a  See Flowsheets for today's PHQ-9 and ALICE-7 results  Previous PHQ-9:   PHQ-9 SCORE 4/12/2021 4/12/2021 4/14/2021   PHQ-9 Total Score MyChart 18 (Moderately severe depression) 18 (Moderately severe depression) 18 (Moderately severe depression)   PHQ-9 Total Score 18 18 18     Previous ALICE-7:   ALICE-7 SCORE 4/12/2021 4/12/2021 4/12/2021   Total Score 17 (severe anxiety) 17 (severe anxiety) 17 (severe anxiety)   Total Score 17 17 17       TOM LEVEL:  No flowsheet data found.    DATA  Extended Session (60+ minutes): No  Interactive Complexity: No  -The need to manage maladaptive communication (related to, e.g., high anxiety, high reactivity, repeated  "questions, or disagreement) among participants that complicates delivery of care  Crisis: Yes, visit entailed Crisis Management / Stabilization requiring urgent assessment and history of the crisis state, mental status exam and disposition  [facility] Patient: No    Treatment Objective(s) Addressed in This Session:  Target Behavior(s): n/a    Depressed Mood: Increase interest, engagement, and pleasure in doing things  Decrease frequency and intensity of feeling down, depressed, hopeless  Improve quantity and quality of night time sleep / decrease daytime naps  Feel less tired and more energy during the day   Improve diet, appetite, mindful eating, and / or meal planning  Identify negative self-talk and behaviors: challenge core beliefs, myths, and actions  Improve concentration, focus, and mindfulness in daily activities   Feel less fidgety, restless or slow in daily activities / interpersonal interactions  Decrease thoughts that you'd be better off dead or of suicide / self-harm  Discuss motivation / ambivalence about taking anti-depressant / mood stabilizer medication(s)  Discuss motivation / ambivalence about a referral to specialty mental health services (Therapy, Day Tx, PHP)  Anxiety: will experience a reduction in anxiety, will develop more effective coping skills to manage anxiety symptoms, will develop healthy cognitive patterns and beliefs and will increase ability to function adaptively  Risk / Safety: will develop strategies for more effective management of risk issues and will follow safety plan (in EMR) for more effective management of risk issues    Current Stressors / Issues:      Talked to patient about bridging and next level of care. Discussed ACT, DBT and CBT therapy. Helped patient get set up for DBT with a long term therapist.   Will bridge until then.  Reviewed safety with Patient. Patient denies any current SI,plans or intentions.\" No, no thoughts at all.\"  Patient is still scratching but states " "less. States that the rubber band is helping \"It is differently curving the right way.\" Has her safety plan. Talked about a mix up with her apartment and worked on CBT skills to slow things down and look at the positives.     Appointment Date: 6/8/2021  Appointment Time: 3:00 PM  Location: [employer]  Mariama Tong, [facility]  [address]  [address]  [place] [address]  [phone]      Progress on Treatment Objective(s) / Homework:  n/a    Motivational Interviewing    MI Intervention: Expressed Empathy/Understanding, Supported Autonomy, Collaboration, Evocation, Permission to raise concern or advise, Open-ended questions and Reflections: simple and complex     Change Talk Expressed by the Patient: Desire to change Ability to change Reasons to change    Provider Response to Change Talk: E - Evoked more info from patient about behavior change      Situation        Automatic Thoughts  Cognitive Distortions      Feelings        Behavior        Questioning Thoughts            Also provided psychoeducation about behavioral health condition, symptoms, and treatment options    Care Plan review completed: No    Medication Review:  Changes to psychiatric medications, see updated Medication List in EPIC.     Medication Compliance:  Yes    Changes in Health Issues:   None reported    Chemical Use Review:   Substance Use: Chemical use reviewed, no active concerns identified      Tobacco Use: No current tobacco use.      Assessment: Current Emotional / Mental Status (status of significant symptoms):  Risk status (Self / Other harm or suicidal ideation)  Patient has had a history of suicidal ideation: 2013 thoughts of slitting wrist and self-injurious behavior: scratching  Patient denies current fears or concerns for personal safety.  Patient denies current or recent suicidal ideation or behaviors.  Patient denies current or recent homicidal ideation or " behaviors.  Patient reports current or recent self injurious behavior or ideation including scratching.  Patient denies other safety concerns.  A safety and risk management plan has been developed including: Patient consented to co-developed safety plan.  A safety and risk management plan was completed.  Patient agreed to use safety plan should any safety concerns arise.  A copy was given to the patient.  [place] Suicide Severity Rating Scale (Lifetime/Recent)  [place] Suicide Severity Rating (Lifetime/Recent) 4/12/2021   1. Wish to be Dead (Lifetime) Yes   Wish to be Dead Description (Lifetime) 2013   1. Wish to be Dead (Recent) No   2. Non-Specific Active Suicidal Thoughts (Lifetime) Yes   2. Non-Specific Active Suicidal Thoughts (Recent) No   3. Active Suicidal Ideation with any Methods (Not Plan) Without Intent to Act (Lifetime) Yes   3. Active Suicidal Ideation with any Methods (Not Plan) Without Intent to Act (Recent) No   4. Active Suicidal Ideation with Some Intent to Act, Without Specific Plan (Lifetime) No   4. Active Suicidal Ideation with Some Intent to Act, Without Specific Plan (Recent) No   5. Active Suicidal Ideation with Specific Plan and Intent (Lifetime) Yes   Active Suicidal Ideation with Specific Plan and Intent Description (Lifetime) 2013 - had plan to slit wrists; told friend who took her to ER   5. Active Suicidal Ideation with Specific Plan and Intent (Recent) No   Most Severe Ideation Rating (Lifetime) 4   Frequency (Lifetime) 1   Duration (Lifetime) 3   Controllability (Lifetime) 4   Protective Factors  (Lifetime) 4   Most Severe Ideation Rating (Past Month) NA   Frequency (Past Month) NA   Duration (Past Month) NA   Controllability (Past Month) NA   Protective Factors (Past Month) NA   Reasons for Ideation (Past Month) NA   Actual Attempt (Lifetime) No   Actual Attempt (Past 3 Months) No   Has subject engaged in non-suicidal self-injurious behavior? (Lifetime) No   Has subject engaged  "in non-suicidal self-injurious behavior? (Past 3 Months) Yes   Interrupted Attempts (Lifetime) Yes   Interrupted Attempt Description (Lifetime) 2013 - had plan to slit wrists; told friend who took her to ER   Total Number of Interrupted Attempts (Lifetime) 1   Interrupted Attempts (Past 3 Months) No   Aborted or Self-Interrupted Attempt (Lifetime) No   Aborted or Self-Interrupted Attempt (Past 3 Months) No   Preparatory Acts or Behavior (Lifetime) No   Preparatory Acts or Behavior (Past 3 Months) No   Most Lethal Attempt Actual Lethality Code NA   Initial/First Attempt Actual Lethality Code NA       Appearance:   Appropriate   Eye Contact:   Good   Psychomotor Behavior: Normal   Attitude:   Cooperative   Orientation:   All  Speech   Rate / Production: Normal    Volume:  Normal   Mood:    Anxious   Affect:    Appropriate   Thought Content:  Clear   Thought Form:  Coherent  Logical   Insight:    Fair     Diagnoses:  1. ALICE (generalized anxiety disorder)    2. Moderate episode of recurrent major depressive disorder (H)    3. Panic disorder without agoraphobia        Collateral Reports Completed:  Communicated with: John Corona and Dr. Frederick     Plan: (Homework, other):  Patient was given information about behavioral services and encouraged to schedule a follow up appointment with the clinic [facility] in 1 week, in 2 weeks.  She was also given information about mental health symptoms and treatment options  and Cognitive Behavioral Therapy skills to practice when experiencing anxiety and depression.  CD Recommendations: No indications of CD issues.  NEISHA Umanzor, [facility]      ______________________________________________________________________        Integrated Behavioral Health Services                                      Patient's Name: Pooja High  April 14, 2021    SAFETY PLAN:  Step 1: Warning signs / cues (Thoughts, images, mood, situation, behavior) that a crisis may be developing:    Thoughts: \"I don't matter\" " and If I was not here it would not matter    Images: denied    Thinking Processes: ruminations (can't stop thinking about my problems): work , racing thoughts and highly critical and negative thoughts: about herself    Mood: worsening depression and intense worry    Behaviors: isolating/withdrawing , not taking care of myself, not sleeping enough and hard getting tasks done    Situations: relationship problems and hearing about others family members problems, and Work difficulties   Step 2: Coping strategies - Things I can do to take my mind off of my problems without contacting another person (relaxation technique, physical activity):    Distress Tolerance Strategies:  arts and crafts: coloring books, watch a funny movie: netflix, change body temperature (ice pack/cold water) , paced breathing/progressive muscle relaxation and Apps- Calm, Headspace and Calm  Try a rubber band on wrist.     Physical Activities: go for a walk, deep breathing and stretching     Focus on helpful thoughts:  remind myself of what is important to me: my son  Step 3: People and social settings that provide distraction:   Name: Dede  Phone: in her phone   Name: Dao Phone: in her [employer]   work and [employer]   Step 4: Remind myself of people and things that are important to me and worth living for:    My son and my kids at my job    Step 5: When I am in crisis, I can ask these people to help me use my safety plan:   Name: Dede   Phone: in phone   Name: Dao Phone: in phone     Step 6: Making the environment safe:     be around others  Step 7: Professionals or agencies I can contact during a crisis:    Suicide Prevention Lifeline: 2-670-007-TALK (6175)    Crisis Text Line Service: Text   [place]  to 170620.  Local Crisis Services: If you are in immediate danger, call 781.  If you or someone you know is experiencing a mental health crisis and you need help, the following crisis  hotlines are available to help.    Suicide Prevention Lifeline:  "7-991-106-TALK (9561)    Crisis Text Line Service: Text \"[place]\" to 185731.    [facility]    [place] [facility]  Behavioral Emergency Center  [address] [place] [place] [place] [address]  [phone]  [facility] Crisis Response Services  (Adults & Children)  [phone]  [facility] -  Acute Psychiatric Services (APS)  Assessment & Referral: [phone]  Suicide Hotline: 805.360.7846    [facility] Crisis Team  [phone]    [facility] Adult Mental Health Services   [phone]  Referrals: [phone]  Crisis: [phone]    [facility]    Emergency Department  [address] [place] [address]  [phone]  [place] [employer]    6-639-BllpNbv (1-788.369.7261)  [facility] Crisis Response Unit    [phone]  [facility]  Community Outreach for Psychiatric Emergencies  [phone]  [facility] Crisis Services  [facility] Adult Mental Health Services - Crisis Services (24/7)  Information & Referrals: [phone]  Crisis Line: [phone]    [facility] Emergency Center (24/7)  [phone] TDD    Call 911 or go to my nearest emergency department.   I helped develop this safety plan and agree to use it when needed.  I have been given a copy of this plan.      Patient signature: ____verbally agreed. Sent by [employer]__________________________________________________________  Today s date:  April 14, 2021  Adapted from Safety Plan Template 2008 Jess Bedoya and Cornelio Alvarez is reprinted with the express permission of the authors.  No portion of the Safety Plan Template may be reproduced without the express, written permission.  You can contact the authors at s@[facility], located within [facility] or [email or web address].          "

## 2021-04-29 NOTE — Clinical Note
Just  wanted to give you both an update. She agreed to long term therapy. We decided to try a therapist that does DBT. It is set for June. I can bridge until then. Her scratching is down and the rubber band has helped. She wanted to know more about ACT therapy. I told her John is her man and I would let you know so you can talk to her about it.  Thanks,  Brenna

## 2021-05-11 ENCOUNTER — VIRTUAL VISIT (OUTPATIENT)
Dept: PSYCHOLOGY | Facility: CLINIC | Age: 26
End: 2021-05-11
Payer: COMMERCIAL

## 2021-05-11 ENCOUNTER — VIRTUAL VISIT (OUTPATIENT)
Dept: PSYCHIATRY | Facility: CLINIC | Age: 26
End: 2021-05-11
Payer: COMMERCIAL

## 2021-05-11 DIAGNOSIS — F41.9 ANXIETY: Primary | ICD-10-CM

## 2021-05-11 DIAGNOSIS — F41.0 PANIC ATTACK: ICD-10-CM

## 2021-05-11 DIAGNOSIS — F41.1 GAD (GENERALIZED ANXIETY DISORDER): ICD-10-CM

## 2021-05-11 DIAGNOSIS — R41.840 ATTENTION DEFICIT: Primary | ICD-10-CM

## 2021-05-11 DIAGNOSIS — Z86.59 HX OF MAJOR DEPRESSION: ICD-10-CM

## 2021-05-11 DIAGNOSIS — F33.1 MODERATE EPISODE OF RECURRENT MAJOR DEPRESSIVE DISORDER (H): ICD-10-CM

## 2021-05-11 DIAGNOSIS — G47.00 INSOMNIA, UNSPECIFIED TYPE: ICD-10-CM

## 2021-05-11 PROCEDURE — 99214 OFFICE O/P EST MOD 30 MIN: CPT | Mod: GT | Performed by: PSYCHIATRY & NEUROLOGY

## 2021-05-11 PROCEDURE — 90832 PSYTX W PT 30 MINUTES: CPT | Mod: 95 | Performed by: PSYCHOLOGIST

## 2021-05-11 RX ORDER — HYDROXYZINE PAMOATE 25 MG/1
25-50 CAPSULE ORAL
Qty: 60 CAPSULE | Refills: 1 | Status: SHIPPED | OUTPATIENT
Start: 2021-05-11 | End: 2021-12-31

## 2021-05-11 RX ORDER — BUPROPION HYDROCHLORIDE 100 MG/1
100 TABLET, EXTENDED RELEASE ORAL 2 TIMES DAILY
Qty: 60 TABLET | Refills: 1 | Status: SHIPPED | OUTPATIENT
Start: 2021-05-11 | End: 2021-07-16

## 2021-05-11 RX ORDER — CITALOPRAM HYDROBROMIDE 20 MG/1
40 TABLET ORAL DAILY
Qty: 30 TABLET | Refills: 1 | Status: SHIPPED | OUTPATIENT
Start: 2021-05-11 | End: 2021-07-16 | Stop reason: DRUGHIGH

## 2021-05-11 NOTE — PROGRESS NOTES
Collaborative Care Psychiatry Service ([facility]PS)  May 11, 2021      Behavioral Health Clinician Progress Note    Patient Name: Pooja High      Telemedicine Visit: The patient's condition can be safely assessed and treated via synchronous audio and visual telemedicine encounter.      Reason for Telemedicine Visit: Services only offered telehealth    Originating Site (Patient Location): Patient's home    Distant Site (Provider Location): Provider Remote Setting    Consent:  The patient/guardian has verbally consented to: the potential risks and benefits of telemedicine (video visit) versus in person care; bill my insurance or make self-payment for services provided; and responsibility for payment of non-covered services.     Mode of Communication:  Video Conference via [employer]    As the provider I attest to compliance with applicable laws and regulations related to telemedicine.         Service Type:  Individual      Session Start Time: 08:45am  Session End Time: 09:10am      Session Length: 16 - 37      Attendees: Patient    Visit Activities (Refresh list every visit): [facility] Only    Diagnostic Assessment Date: 04/12/2021  See Flowsheets for today's PHQ-9 and ALICE-7 results  Previous PHQ-9:   PHQ-9 SCORE 4/12/2021 4/12/2021 4/14/2021   PHQ-9 Total Score [facility] 18 (Moderately severe depression) 18 (Moderately severe depression) 18 (Moderately severe depression)   PHQ-9 Total Score 18 18 18       Previous ALICE-7:   ALICE-7 SCORE 4/12/2021 4/12/2021 4/12/2021   Total Score 17 (severe anxiety) 17 (severe anxiety) 17 (severe anxiety)   Total Score 17 17 17       WHODAS  WHODAS 2.0 Total Score 4/12/2021 4/14/2021   Total Score 26 26   Total Score MyChart 26 26        AUDIT  AUDIT - Alcohol Use Disorders Identification Test - Reproduced from the World Health Organization Audit 2001 (Second Edition) 4/12/2021   1.  How often do you have a drink containing alcohol? 2 to 3 times a week   2.  How many drinks containing alcohol do  "you have on a typical day when you are drinking? 1 or 2   3.  How often do you have five or more drinks on one occasion? Never   4.  How often during the last year have you found that you were not able to stop drinking once you had started? Never   5.  How often during the last year have you failed to do what was normally expected of you because of drinking? Never   6.  How often during the last year have you needed a first drink in the morning to get yourself going after a heavy drinking session? Never   7.  How often during the last year have you had a feeling of guilt or remorse after drinking? Never   8.  How often during the last year have you been unable to remember what happened the night before because of your drinking? Never   9.  Have you or someone else been injured because of your drinking? No   10. Has a relative, friend, doctor or other health care worker been concerned about your drinking or suggested you cut down? No   TOTAL SCORE 3       DATA  Extended Session (60+ minutes): No  Interactive Complexity: No  Crisis: No    Medication Compliance:  Yes      Chemical Use Review:   Substance Use: Problem use continues with no change since last session, Stage of Change: Pre-contemplation        Tobacco Use: No current tobacco use.      Current Stressors / Issues:  Feels that wellbutrin has been helping. \"I'm more focused when I'm doing tasks.\" She noted that she feels more fatigued during the day \"and it's hard to keep my eyes open.\" She noted that it is odd because despite the fatigue she is more focused and able to accomplish more tasks. She continues to have difficulty falling to sleep but stays asleep. She has been getting 5-7 hours per night. The move to her apartment was delayed but is happening soon.       Review of Symptoms per patient report:  Depression:     Change in sleep, Lack of interest, Change in energy level, Difficulties concentrating, Irritability, Feeling sad, down, or depressed, " Withdrawn, Frequent crying, Anger outbursts and Self-injurious behavior  Radha:             No Symptoms  Psychosis:       No Symptoms  Anxiety:           Excessive worry, Nervousness, Physical complaints, such as headaches, stomachaches, muscle tension, Social anxiety, Sleep disturbance, Ruminations, Poor concentration, Irritability and Anger outbursts  Panic:              Palpitations, Tremors, Shortness of breath, Tingling, Numbness and Sense of impending doom. Was having them weekly; last one was a year ago.  Post Traumatic Stress Disorder:  No Symptoms   Eating Disorder:          No Symptoms  ADD / ADHD:              No symptoms  Conduct Disorder:       No symptoms  Autism Spectrum Disorder:     No symptoms  Obsessive Compulsive Disorder:       No Symptoms      Changes in Health Issues:   None reported    Assessment: Current Emotional / Mental Status (status of significant symptoms):  Risk status (Self / Other harm or suicidal ideation)  Patient has had a history of suicidal ideation:   and self-injurious behavior:    Patient denies current fears or concerns for personal safety.  Patient denies current or recent suicidal ideation or behaviors.  Patient denies current or recent homicidal ideation or behaviors.  Patient reports current or recent self injurious behavior or ideation including  .  Patient denies other safety concerns.  A safety and risk management plan has been developed including: Patient consented to co-developed safety plan.  A safety and risk management plan was completed.  Patient agreed to use safety plan should any safety concerns arise.  A copy was given to the patient.    Appearance:   Appropriate   Eye Contact:   Good   Psychomotor Behavior: Normal   Attitude:   Cooperative   Orientation:   All  Speech   Rate / Production: Normal    Volume:  Normal   Mood:    Normal  Affect:    Appropriate   Thought Content:  Clear   Thought Form:  Coherent  Logical   Insight:    Good     Diagnoses:  1.  Anxiety    2. Moderate episode of recurrent major depressive disorder (H)    3. Panic attack        Collateral Reports Completed:  Communicated with: Dr. Frederick    Plan: (Homework, other):  Patient was given information about behavioral services and encouraged to schedule a follow up appointment with the clinic [facility] in conjunction with next [facility] appointment.  She was also given information about mental health symptoms and treatment options .  CD Recommendations: No indications of CD issues.      John Corona PsyD, LP      Gagandeep Corona PsyD  May 11, 2021

## 2021-05-11 NOTE — PROGRESS NOTES
"Pooja High is a 25 year old year old who is being evaluated via a billable video visit.      How would you like to obtain your AVS? MyChart  If you are dropped from the video visit, the video invite should be resent to: Text to cell phone: see Epic  Will anyone else be joining your video visit? No       Telemedicine Visit: The patient's condition can be safely assessed and treated via synchronous audio and visual telemedicine encounter.      Reason for Telemedicine Visit: Covid-19 Pandemic    Originating Site (Patient Location): Patient's home     Distant Site (Provider Location): Provider Remote Setting    Mode of Communication:  Video Conference via Mediamind    As the provider I attest to compliance with applicable laws and regulations related to telemedicine.        Outpatient Psychiatric Progress Note    Name: Pooja High   : 1995                    Primary Care Provider: [place] [facility] Yashria   Therapist: Seeing [facility] [place] NEISHA Umanzor; will be starting with a DBT specialist     PHQ-9 scores:  PHQ-9 SCORE 2021   PHQ-9 Total Score MyChart 18 (Moderately severe depression) 18 (Moderately severe depression) 18 (Moderately severe depression)   PHQ-9 Total Score 18 18 18       ALICE-7 scores:  ALICE-7 SCORE 2021   Total Score 17 (severe anxiety) 17 (severe anxiety) 17 (severe anxiety)   Total Score 17 17 17       Patient Identification:  Patient is a 25 year old,   White Not  or  female  who presents for return visit with me.  Patient is currently employed full time. Patient attended the phone/video session alone. Patient prefers to be called: \"Pooja\".    Interim History:  I last saw Pooja High for outpatient psychiatry Consultation on 21. During that appointment, we:      Continue Celexa/citalopram 40 mg daily for mood and anxiety    Continue buspirone/BuSpar 15 mg twice daily for anxiety    Continue hydroxyzine 25-50 " "up to 3 times daily as needed for anxiety, sleep.    Start Wellbutrin  mg daily for mood and to augment Celexa/citalopram.  Might also be beneficial for focus/attention.    Referral placed for ADHD psychological testing.    Continue therapy as planned.    5/11: Overall patient feels like she is doing a little bit better since last visit.  She feels like things are on the right course.  Wellbutrin SR helpful for focus and attention.  It does seem to wear off around lunchtime in the afternoon.  She will feel quite tired and fatigued at this time.  She takes her first dose around 530-6 AM in the morning.  Some trouble falling asleep at night but does stay asleep after she gets to sleep.  Work is actually going quite well.  She is building good relationships with clients.  She is very pleased about this.  She denies safety concerns.  No SI today.  Denies problematic alcohol/drug use.    Per [facility], Dr. John Corona, during today's team-based visit:  Feels that wellbutrin has been helping. \"I'm more focused when I'm doing tasks.\" She noted that she feels more fatigued during the day \"and it's hard to keep my eyes open.\" She noted that it is odd because despite the fatigue she is more focused and able to accomplish more tasks. She continues to have difficulty falling to sleep but stays asleep. She has been getting 5-7 hours per night. The move to her apartment was delayed but is happening soon.     Psychiatric ROS:  Pooja High reports mood has been: improved  Anxiety has been: improved  Sleep has been: some struggles falling asleep  Radha sxs: None  Psychosis sxs: None  ADHD/ADD sxs: Some improved focus/concentration, has ADHD testing in July  PTSD sxs: None  PHQ9 and GAD7 scores were reviewed today if completed.   Medication side effects: Yes: Feels quite fatigued and tired when Wellbutrin SR dose wears off around lunchtime  Current stressors include: Symptoms and See HPI above  Coping mechanisms and supports include: " Therapy, Family, Hobbies and Friends    Current medications include:   Current Outpatient Medications   Medication Sig     buPROPion (WELLBUTRIN SR) 100 MG 12 hr tablet Take 1 tablet (100 mg) by mouth daily     busPIRone (BUSPAR) 15 MG tablet Take 1 tablet (15 mg) by mouth 2 times daily     Cholecalciferol (VITAMIN D-3 PO)      citalopram (CELEXA) 40 MG tablet Take 1 tablet (40 mg) by mouth daily     Fexofenadine HCl (ALLERGY 24-HR PO)      hydrOXYzine (VISTARIL) 25 MG capsule Take 1 capsule (25 mg) by mouth At Bedtime     levonorgestrel (MIRENA) 20 MCG/24HR IUD 1 Device by Intrauterine route     Meclizine HCl (ANTIVERT PO)      multivitamin w/minerals (MULTI-VITAMIN) tablet Take 1 tablet by mouth daily     No current facility-administered medications for this visit.        Past Medical/Surgical History:  Past Medical History:   Diagnosis Date     Anxiety 10/17/2019     Depressive disorder      ALICE (generalized anxiety disorder) 9/22/2020     Moderate episode of recurrent major depressive disorder (H) 9/22/2020     Panic disorder       has a past medical history of Anxiety (10/17/2019), Depressive disorder, ALICE (generalized anxiety disorder) (9/22/2020), Moderate episode of recurrent major depressive disorder (H) (9/22/2020), and Panic disorder.    Social History:  Reviewed. No changes to social history except as noted above in HPI.    Vital Signs:   None. This is phone/video visit.     Labs:  Most recent laboratory results reviewed and no new labs.     Review of Systems:  10 systems (general, cardiovascular, respiratory, eyes, ENT, endocrine, GI, , M/S, neurological) were reviewed. Most pertinent finding(s) is/are: denies fever, cough, headaches, shortness of breath, chest pain, N/V, constipation/diarrhea, trouble urinating, aches and pains. The remaining systems are all unremarkable.    Mental Status Examination (limited as this is by phone/video):  Appearance: Awake, alert, appears stated age, no acute  distress, well-groomed  Attitude:  cooperative, pleasant  Motor: No gross abnormalities observed via video, not formally tested  Oriented to:  person, place, time, and situation  Attention Span and Concentration:  normal  Speech:  clear, coherent, regular rate, rhythm, and volume  Language: intact  Mood:  good  Affect:  appropriate and in normal range, mood congruent and Bright  Associations:  no loose associations  Thought Process:  logical, linear and goal oriented  Thought Content:  no evidence of suicidal ideation or homicidal ideation, no evidence of psychotic thought, no auditory hallucinations present and no visual hallucinations present  Recent and Remote Memory:  Intact to interview. Not formally assessed. No amnesia.  Fund of Knowledge: appropriate  Insight:  good  Judgment:  intact, adequate for safety  Impulse Control:  intact    Suicide Risk Assessment:  Today Pooja High reports no suicidal ideation. Based on all available evidence including the factors cited above, Pooja High does not appear to be at imminent risk for self-harm, does not meet criteria for a 72-hr hold, and therefore remains appropriate for ongoing outpatient level of care.  A thorough assessment of risk factors related to suicide and self-harm have been reviewed and are noted above. The patient convincingly denies suicidality on several occasions. Local community safety resources printed and reviewed for patient to use if needed. There was no deceit detected, and the patient presented in a manner that was believable.     DSM5 Diagnosis:  300.02 (F41.1) Generalized Anxiety Disorder   History major depression  Attention deficit (Rule Out ADHD)  Insomnia, unspecified    Medical comorbidities include:   Patient Active Problem List    Diagnosis Date Noted     Moderate episode of recurrent major depressive disorder (H) 09/22/2020     Priority: Medium     ALICE (generalized anxiety disorder) 09/22/2020     Priority: Medium     Panic  disorder without agoraphobia 09/22/2020     Priority: Medium     Anxiety 10/17/2019     Priority: Medium     ACP (advance care planning) 09/22/2020     Priority: Low     Health Care Home 09/22/2020     Priority: Low       Psychosocial & Contextual Factors: see HPI above    Assessment:  Pooja High reports overall some good improvement in her symptoms on Wellbutrin SR and with continued individual therapy.  She takes her Wellbutrin dose pretty early in the morning and so I suggest adding the afternoon dose to prevent the crash she is experiencing.  Increased fatigue and sedation midday likely due to the first Wellbutrin SR dose wearing off.  I also recommend decreasing her citalopram since we will continue to optimize Wellbutrin SR.  She is agreeable to this plan.  I am hoping she might start sleeping a little better after second dose of Wellbutrin wears off.  She will continue to monitor.  She is encouraged to continue with ADHD testing in July.  She may also be starting with a DBT specialist.  She continues to also be open to the idea of ACT therapy.     Medication side effects and alternatives were reviewed. Health promotion activities recommended and reviewed today. All questions addressed. Education and counseling completed regarding risks and benefits of medications and psychotherapy options. Recommend therapy for additional support.     Treatment Plan:    Continue buspirone 15 mg twice daily for anxiety.    Continue hydroxyzine 25-50 mg at bedtime as needed for sleep    Decrease citalopram/Celexa to 20 mg daily for mood    Increase Wellbutrin SR to 100 mg twice daily for mood    Continue with ADHD psychological testing    Continue all other cares per primary care provider.     Continue all other medications as reviewed per electronic medical record today.     Safety plan reviewed. To the Emergency Department as needed or call after hours crisis line at [phone] or [phone]. [place] Crisis Text  Line. Text [place] to 872186 or Suicide LifeLine Chat: suicidepreventionlifeline.org/chat    Continue therapy as planned.     Schedule an appointment with me in 6 weeks or sooner as needed. Call [facility] at [phone] to schedule.    Follow up with primary care provider as planned or for acute medical concerns.    Call the psychiatric nurse line with medication questions or concerns at [phone].    Interface21hart may be used to communicate with your provider, but this is not intended to be used for emergencies.    Administrative Billing:   Phone Call/Video Duration: 8 Minutes  Start: 9:22a  Stop: 9:30a    Time spent with patient was 8 minutes and greater than 50% of time or 5 minutes was spent in counseling and coordination of care regarding above diagnoses and treatment plan. Patient with multiple psychiatric diagnoses and multiple medication changes adding to complexity of care.      Patient Status:  Patient will continue to be seen for ongoing consultation and stabilization.    Signed:   Magaly Frederick DO  [facility]    Disclaimer: This note consists of symbols derived from keyboarding, dictation and/or voice recognition software. As a result, there may be errors in the script that have gone undetected. Please consider this when interpreting information found in this chart.

## 2021-05-11 NOTE — PATIENT INSTRUCTIONS
Treatment Plan:    Continue buspirone 15 mg twice daily for anxiety.    Continue hydroxyzine 25-50 mg at bedtime as needed for sleep    Decrease citalopram/Celexa to 20 mg daily for mood    Increase Wellbutrin SR to 100 mg twice daily for mood    Continue with ADHD psychological testing    Continue all other cares per primary care provider.     Continue all other medications as reviewed per electronic medical record today.     Safety plan reviewed. To the Emergency Department as needed or call after hours crisis line at [phone] or [phone]. [place] Crisis Text Line. Text [place] to 797399 or Suicide LifeLine Chat: suicidepreventionlifeline.org/chat    Continue therapy as planned.     Schedule an appointment with me in 6 weeks or sooner as needed. Call [facility] at [phone] to schedule.    Follow up with primary care provider as planned or for acute medical concerns.    Call the psychiatric nurse line with medication questions or concerns at [phone].    Blendinhart may be used to communicate with your provider, but this is not intended to be used for emergencies.

## 2021-05-13 ENCOUNTER — MYC MEDICAL ADVICE (OUTPATIENT)
Dept: PSYCHIATRY | Facility: CLINIC | Age: 26
End: 2021-05-13

## 2021-05-13 ENCOUNTER — VIRTUAL VISIT (OUTPATIENT)
Dept: BEHAVIORAL HEALTH | Facility: CLINIC | Age: 26
End: 2021-05-13
Payer: COMMERCIAL

## 2021-05-13 DIAGNOSIS — F41.0 PANIC DISORDER WITHOUT AGORAPHOBIA: ICD-10-CM

## 2021-05-13 DIAGNOSIS — F33.1 MODERATE EPISODE OF RECURRENT MAJOR DEPRESSIVE DISORDER (H): ICD-10-CM

## 2021-05-13 DIAGNOSIS — F41.1 GAD (GENERALIZED ANXIETY DISORDER): Primary | ICD-10-CM

## 2021-05-13 PROCEDURE — 90837 PSYTX W PT 60 MINUTES: CPT | Mod: GT | Performed by: MARRIAGE & FAMILY THERAPIST

## 2021-05-13 ASSESSMENT — ANXIETY QUESTIONNAIRES
4. TROUBLE RELAXING: MORE THAN HALF THE DAYS
6. BECOMING EASILY ANNOYED OR IRRITABLE: MORE THAN HALF THE DAYS
GAD7 TOTAL SCORE: 10
GAD7 TOTAL SCORE: 10
2. NOT BEING ABLE TO STOP OR CONTROL WORRYING: SEVERAL DAYS
GAD7 TOTAL SCORE: 10
1. FEELING NERVOUS, ANXIOUS, OR ON EDGE: MORE THAN HALF THE DAYS
7. FEELING AFRAID AS IF SOMETHING AWFUL MIGHT HAPPEN: SEVERAL DAYS
7. FEELING AFRAID AS IF SOMETHING AWFUL MIGHT HAPPEN: SEVERAL DAYS
5. BEING SO RESTLESS THAT IT IS HARD TO SIT STILL: SEVERAL DAYS
3. WORRYING TOO MUCH ABOUT DIFFERENT THINGS: SEVERAL DAYS

## 2021-05-13 ASSESSMENT — PATIENT HEALTH QUESTIONNAIRE - PHQ9
SUM OF ALL RESPONSES TO PHQ QUESTIONS 1-9: 12
10. IF YOU CHECKED OFF ANY PROBLEMS, HOW DIFFICULT HAVE THESE PROBLEMS MADE IT FOR YOU TO DO YOUR WORK, TAKE CARE OF THINGS AT HOME, OR GET ALONG WITH OTHER PEOPLE: SOMEWHAT DIFFICULT
SUM OF ALL RESPONSES TO PHQ QUESTIONS 1-9: 12

## 2021-05-13 NOTE — PROGRESS NOTES
[facility] Primary Care: Integrated Behavioral Health  May 13th 2021  Telemedicine Visit: The patient's condition can be safely assessed and treated via synchronous audio and visual telemedicine encounter.      Reason for Telemedicine Visit: Services only offered telehealth    Originating Site (Patient Location): Patient's place of employment    Distant Site (Provider Location): [facility]    Consent:  The patient/guardian has verbally consented to: the potential risks and benefits of telemedicine (video visit) versus in person care; bill my insurance or make self-payment for services provided; and responsibility for payment of non-covered services.     Mode of Communication:  Video Conference via [employer] [facility]    As the provider I attest to compliance with applicable laws and regulations related to telemedicine.      Behavioral Health Clinician Progress Note    Patient Name: Pooja High           Service Type:  Individual      Service Location:   Face to Face in Home / Community     Session Start Time: 1:00 pm  Session End Time: 1:58pm      Session Length: 38 - 52      Attendees: Client    Visit Activities (Refresh list every visit): [facility] Only    Diagnostic Assessment Date: John Corona on 4/12/21  Treatment Plan Review Date: n/a  See Flowsheets for today's PHQ-9 and ALICE-7 results  Previous PHQ-9:   PHQ-9 SCORE 4/12/2021 4/14/2021 5/13/2021   PHQ-9 Total Score MyChart 18 (Moderately severe depression) 18 (Moderately severe depression) 12 (Moderate depression)   PHQ-9 Total Score 18 18 12     Previous ALICE-7:   ALICE-7 SCORE 4/12/2021 4/12/2021 5/13/2021   Total Score 17 (severe anxiety) 17 (severe anxiety) 10 (moderate anxiety)   Total Score 17 17 10       TOM LEVEL:  No flowsheet data found.    DATA  Extended Session (60+ minutes): No  Interactive Complexity: No  Crisis: No  [facility] Patient: No    Treatment Objective(s) Addressed in This Session:  Target Behavior(s):  "n/a    Depressed Mood: Increase interest, engagement, and pleasure in doing things  Decrease frequency and intensity of feeling down, depressed, hopeless  Improve quantity and quality of night time sleep / decrease daytime naps  Feel less tired and more energy during the day   Improve diet, appetite, mindful eating, and / or meal planning  Identify negative self-talk and behaviors: challenge core beliefs, myths, and actions  Improve concentration, focus, and mindfulness in daily activities   Feel less fidgety, restless or slow in daily activities / interpersonal interactions  Decrease thoughts that you'd be better off dead or of suicide / self-harm  Discuss motivation / ambivalence about taking anti-depressant / mood stabilizer medication(s)  Discuss motivation / ambivalence about a referral to specialty mental health services (Therapy, Day Tx, PHP)  Anxiety: will experience a reduction in anxiety, will develop more effective coping skills to manage anxiety symptoms, will develop healthy cognitive patterns and beliefs and will increase ability to function adaptively  Risk / Safety: will develop strategies for more effective management of risk issues and will follow safety plan (in EMR) for more effective management of risk issues    Current Stressors / Issues:    Reviewed safety with Patient. Patient denies any current SI,plans or intentions. \"No thoughts at all.\" Patient reports that she is scratching less and is able to stop herself right after she stops.   Disuccsed a conflict at work. Processed boundaries and patient feelings of having to solve problems or take care of others. Discussed patient setting boundaries and how she is trying to fix something because she doesn't like conflict but is causing her more stress. Discussed empathy vs sympathy. Worked on ways to stop bringing the hotdish to make her friends feel better vs being present to help them and her.       Progress on Treatment Objective(s) / " Homework:  n/a    Motivational Interviewing    MI Intervention: Expressed Empathy/Understanding, Supported Autonomy, Collaboration, Evocation, Permission to raise concern or advise, Open-ended questions and Reflections: simple and complex     Change Talk Expressed by the Patient: Desire to change Ability to change Reasons to change    Provider Response to Change Talk: E - Evoked more info from patient about behavior change      Situation        Automatic Thoughts  Cognitive Distortions      Feelings        Behavior        Questioning Thoughts            Also provided psychoeducation about behavioral health condition, symptoms, and treatment options    Care Plan review completed: No    Medication Review:  Changes to psychiatric medications, see updated Medication List in EPIC.     Medication Compliance:  Yes    Changes in Health Issues:   None reported    Chemical Use Review:   Substance Use: Chemical use reviewed, no active concerns identified      Tobacco Use: No current tobacco use.      Assessment: Current Emotional / Mental Status (status of significant symptoms):  Risk status (Self / Other harm or suicidal ideation)  Patient has had a history of suicidal ideation: 2013 thoughts of slitting wrist and self-injurious behavior: scratching  Patient denies current fears or concerns for personal safety.  Patient denies current or recent suicidal ideation or behaviors.  Patient denies current or recent homicidal ideation or behaviors.  Patient reports current or recent self injurious behavior or ideation including scratching.  Patient denies other safety concerns.  A safety and risk management plan has been developed including: Patient consented to co-developed safety plan.  A safety and risk management plan was completed.  Patient agreed to use safety plan should any safety concerns arise.  A copy was given to the patient.  Cross Suicide Severity Rating Scale (Lifetime/Recent)  Cross Suicide Severity Rating  (Lifetime/Recent) 4/12/2021   1. Wish to be Dead (Lifetime) Yes   Wish to be Dead Description (Lifetime) 2013   1. Wish to be Dead (Recent) No   2. Non-Specific Active Suicidal Thoughts (Lifetime) Yes   2. Non-Specific Active Suicidal Thoughts (Recent) No   3. Active Suicidal Ideation with any Methods (Not Plan) Without Intent to Act (Lifetime) Yes   3. Active Suicidal Ideation with any Methods (Not Plan) Without Intent to Act (Recent) No   4. Active Suicidal Ideation with Some Intent to Act, Without Specific Plan (Lifetime) No   4. Active Suicidal Ideation with Some Intent to Act, Without Specific Plan (Recent) No   5. Active Suicidal Ideation with Specific Plan and Intent (Lifetime) Yes   Active Suicidal Ideation with Specific Plan and Intent Description (Lifetime) 2013 - had plan to slit wrists; told friend who took her to ER   5. Active Suicidal Ideation with Specific Plan and Intent (Recent) No   Most Severe Ideation Rating (Lifetime) 4   Frequency (Lifetime) 1   Duration (Lifetime) 3   Controllability (Lifetime) 4   Protective Factors  (Lifetime) 4   Most Severe Ideation Rating (Past Month) NA   Frequency (Past Month) NA   Duration (Past Month) NA   Controllability (Past Month) NA   Protective Factors (Past Month) NA   Reasons for Ideation (Past Month) NA   Actual Attempt (Lifetime) No   Actual Attempt (Past 3 Months) No   Has subject engaged in non-suicidal self-injurious behavior? (Lifetime) No   Has subject engaged in non-suicidal self-injurious behavior? (Past 3 Months) Yes   Interrupted Attempts (Lifetime) Yes   Interrupted Attempt Description (Lifetime) 2013 - had plan to slit wrists; told friend who took her to ER   Total Number of Interrupted Attempts (Lifetime) 1   Interrupted Attempts (Past 3 Months) No   Aborted or Self-Interrupted Attempt (Lifetime) No   Aborted or Self-Interrupted Attempt (Past 3 Months) No   Preparatory Acts or Behavior (Lifetime) No   Preparatory Acts or Behavior (Past 3 Months)  "No   Most Lethal Attempt Actual Lethality Code NA   Initial/First Attempt Actual Lethality Code NA       Appearance:   Appropriate   Eye Contact:   Good   Psychomotor Behavior: Normal   Attitude:   Cooperative   Orientation:   All  Speech   Rate / Production: Normal    Volume:  Normal   Mood:    Anxious   Affect:    Appropriate   Thought Content:  Clear   Thought Form:  Coherent  Logical   Insight:    Fair     Diagnoses:  1. ALICE (generalized anxiety disorder)    2. Moderate episode of recurrent major depressive disorder (H)    3. Panic disorder without agoraphobia        Collateral Reports Completed:  Communicated with: John Corona and Dr. Frederick     Plan: (Homework, other):  Patient was given information about behavioral services and encouraged to schedule a follow up appointment with the clinic [facility] in 1 week, in 2 weeks.  She was also given information about mental health symptoms and treatment options  and Cognitive Behavioral Therapy skills to practice when experiencing anxiety and depression.  CD Recommendations: No indications of CD issues.  NEISHA Umanzor, [facility]      ______________________________________________________________________        Integrated Behavioral Health Services                                      Patient's Name: Pooja High  April 14, 2021    SAFETY PLAN:  Step 1: Warning signs / cues (Thoughts, images, mood, situation, behavior) that a crisis may be developing:    Thoughts: \"I don't matter\" and If I was not here it would not matter    Images: denied    Thinking Processes: ruminations (can't stop thinking about my problems): work , racing thoughts and highly critical and negative thoughts: about herself    Mood: worsening depression and intense worry    Behaviors: isolating/withdrawing , not taking care of myself, not sleeping enough and hard getting tasks done    Situations: relationship problems and hearing about others family members problems, and Work difficulties   Step 2: Coping " "strategies - Things I can do to take my mind off of my problems without contacting another person (relaxation technique, physical activity):    Distress Tolerance Strategies:  arts and crafts: coloring books, watch a funny movie: netflix, change body temperature (ice pack/cold water) , paced breathing/progressive muscle relaxation and Apps- Calm, Headspace and Calm  Try a rubber band on wrist.     Physical Activities: go for a walk, deep breathing and stretching     Focus on helpful thoughts:  remind myself of what is important to me: my son  Step 3: People and social settings that provide distraction:   Name: Ddee  Phone: in her phone   Name: Dao Phone: in her [employer]   work and [employer]   Step 4: Remind myself of people and things that are important to me and worth living for:    My son and my kids at my job    Step 5: When I am in crisis, I can ask these people to help me use my safety plan:   Name: Dede   Phone: in phone   Name: Dao Phone: in phone     Step 6: Making the environment safe:     be around others  Step 7: Professionals or agencies I can contact during a crisis:    Suicide Prevention Lifeline: 2-194-314-OWTN (7712)    Crisis Text Line Service: Text   [place]  to 006611.  Local Crisis Services: If you are in immediate danger, call 911.  If you or someone you know is experiencing a mental health crisis and you need help, the following crisis  hotlines are available to help.    Suicide Prevention Lifeline: 1-313-906948-725-VZLB (7861)    Crisis Text Line Service: Text \"[place]\" to 545464.    [facility] Emergency Department  Behavioral Emergency Center  [facility] [address]  [phone]  [facility] Crisis Response Services  (Adults & Children)  [phone]  [facility] -  Acute Psychiatric Services (APS)  Assessment & Referral: [phone]  Suicide Hotline: " 524.724.4239    [facility] Crisis Team  [phone]    [facility] Adult Mental Health Services   [phone]  Referrals: [phone]  Crisis: [phone]    [facility]    Emergency Department  [address] [place] [address]  [phone]  [place] [employer]    2-729-VxgsNnj ([phone])  [facility] Crisis Response Unit    [phone]  [facility] Outreach for Psychiatric Emergencies  [phone]  [facility] Crisis Services  [facility] Adult Mental Health Services - Crisis Services (24/7)  Information & Referrals: [phone]  Crisis Line: [phone]    [facility] Emergency Center (24/7)  [phone] TDD    Call 911 or go to my nearest emergency department.   I helped develop this safety plan and agree to use it when needed.  I have been given a copy of this plan.      Patient signature: ____verbally agreed. Sent by [employer]__________________________________________________________  Today s date:  April 14, 2021  Adapted from Safety Plan Template 2008 Jess Bedoya and Cornelio Alvarez is reprinted with the express permission of the authors.  No portion of the Safety Plan Template may be reproduced without the express, written permission.  You can contact the authors at [email or web address] or [email or web address].

## 2021-05-14 ASSESSMENT — ANXIETY QUESTIONNAIRES: GAD7 TOTAL SCORE: 10

## 2021-05-14 ASSESSMENT — PATIENT HEALTH QUESTIONNAIRE - PHQ9: SUM OF ALL RESPONSES TO PHQ QUESTIONS 1-9: 12

## 2021-05-26 ENCOUNTER — VIRTUAL VISIT (OUTPATIENT)
Dept: BEHAVIORAL HEALTH | Facility: CLINIC | Age: 26
End: 2021-05-26
Payer: COMMERCIAL

## 2021-05-26 DIAGNOSIS — F33.1 MODERATE EPISODE OF RECURRENT MAJOR DEPRESSIVE DISORDER (H): ICD-10-CM

## 2021-05-26 DIAGNOSIS — F41.1 GAD (GENERALIZED ANXIETY DISORDER): Primary | ICD-10-CM

## 2021-05-26 PROCEDURE — 90834 PSYTX W PT 45 MINUTES: CPT | Mod: GT | Performed by: MARRIAGE & FAMILY THERAPIST

## 2021-05-26 ASSESSMENT — PATIENT HEALTH QUESTIONNAIRE - PHQ9
10. IF YOU CHECKED OFF ANY PROBLEMS, HOW DIFFICULT HAVE THESE PROBLEMS MADE IT FOR YOU TO DO YOUR WORK, TAKE CARE OF THINGS AT HOME, OR GET ALONG WITH OTHER PEOPLE: SOMEWHAT DIFFICULT
SUM OF ALL RESPONSES TO PHQ QUESTIONS 1-9: 12
SUM OF ALL RESPONSES TO PHQ QUESTIONS 1-9: 12
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
## 2021-05-27 ASSESSMENT — PATIENT HEALTH QUESTIONNAIRE - PHQ9: SUM OF ALL RESPONSES TO PHQ QUESTIONS 1-9: 12

## 2021-06-13 ENCOUNTER — MYC MEDICAL ADVICE (OUTPATIENT)
Dept: INTERNAL MEDICINE | Facility: CLINIC | Age: 26
End: 2021-06-13

## 2021-06-25 ENCOUNTER — MYC REFILL (OUTPATIENT)
Dept: FAMILY MEDICINE | Facility: CLINIC | Age: 26
End: 2021-06-25

## 2021-06-25 DIAGNOSIS — F41.1 GAD (GENERALIZED ANXIETY DISORDER): ICD-10-CM

## 2021-06-25 DIAGNOSIS — F41.0 PANIC ATTACK: ICD-10-CM

## 2021-06-25 DIAGNOSIS — F33.1 MODERATE EPISODE OF RECURRENT MAJOR DEPRESSIVE DISORDER (H): ICD-10-CM

## 2021-06-29 RX ORDER — BUSPIRONE HYDROCHLORIDE 15 MG/1
15 TABLET ORAL 2 TIMES DAILY
Qty: 180 TABLET | Refills: 0 | COMMUNITY
Start: 2021-06-29

## 2021-06-29 NOTE — TELEPHONE ENCOUNTER
Refused Prescriptions:                       Disp   Refills    busPIRone (BUSPAR) 15 MG tablet            180 ta*0        Sig: Take 1 tablet (15 mg) by mouth 2 times daily  Refused By: DEVENDRA PAYTON  Reason for Refusal: Originating/Specialty Provider to approve

## 2021-07-15 NOTE — PROGRESS NOTES
Collaborative Care Psychiatry Service ([facility]PS)  July 16, 2021      Behavioral Health Clinician Progress Note    Patient Name: Pooja High      Telemedicine Visit: The patient's condition can be safely assessed and treated via synchronous audio and visual telemedicine encounter.      Reason for Telemedicine Visit: Services only offered telehealth    Originating Site (Patient Location): Patient's home    Distant Site (Provider Location): Provider Remote Setting    Consent:  The patient/guardian has verbally consented to: the potential risks and benefits of telemedicine (video visit) versus in person care; bill my insurance or make self-payment for services provided; and responsibility for payment of non-covered services.     Mode of Communication:  Video Conference via [employer]    As the provider I attest to compliance with applicable laws and regulations related to telemedicine.         Service Type:  Individual      Session Start Time: 11:00am  Session End Time: 11:20am      Session Length: 16 - 37      Attendees: Patient    Visit Activities (Refresh list every visit): [facility] Only    Diagnostic Assessment Date: 04/12/2021  See Flowsheets for today's PHQ-9 and ALICE-7 results  Previous PHQ-9:   PHQ-9 SCORE 4/14/2021 5/13/2021 5/26/2021   PHQ-9 Total Score [facility] 18 (Moderately severe depression) 12 (Moderate depression) 12 (Moderate depression)   PHQ-9 Total Score 18 12 12       Previous ALICE-7:   ALICE-7 SCORE 4/12/2021 4/12/2021 5/13/2021   Total Score 17 (severe anxiety) 17 (severe anxiety) 10 (moderate anxiety)   Total Score 17 17 10       WHODAS  WHODAS 2.0 Total Score 4/12/2021 4/14/2021   Total Score 26 26   Total Score MyChart 26 26        AUDIT  AUDIT - Alcohol Use Disorders Identification Test - Reproduced from the World Health Organization Audit 2001 (Second Edition) 4/12/2021   1.  How often do you have a drink containing alcohol? 2 to 3 times a week   2.  How many drinks containing alcohol do you have on a  "typical day when you are drinking? 1 or 2   3.  How often do you have five or more drinks on one occasion? Never   4.  How often during the last year have you found that you were not able to stop drinking once you had started? Never   5.  How often during the last year have you failed to do what was normally expected of you because of drinking? Never   6.  How often during the last year have you needed a first drink in the morning to get yourself going after a heavy drinking session? Never   7.  How often during the last year have you had a feeling of guilt or remorse after drinking? Never   8.  How often during the last year have you been unable to remember what happened the night before because of your drinking? Never   9.  Have you or someone else been injured because of your drinking? No   10. Has a relative, friend, doctor or other health care worker been concerned about your drinking or suggested you cut down? No   TOTAL SCORE 3       DATA  Extended Session (60+ minutes): No  Interactive Complexity: No  Crisis: No    Medication Compliance:  Yes      Chemical Use Review:   Substance Use: Problem use continues with no change since last session, Stage of Change: Pre-contemplation        Tobacco Use: No current tobacco use.      Current Stressors / Issues:  Reported she is doing really well. Work is going well and is excited. Her mood has been stable despite running out of her buspar \"but it wasn't dramatic.\" She would like a refill. Her sleep and energy is much better since last visit. She said that extra Wellbutrin in the afternoon has really helped. She added her sleep continues to improve as well. She finally was able to move to her new apartment and it is very helpful.       Review of Symptoms per patient report:  Depression:     Change in sleep, Lack of interest, Change in energy level, Difficulties concentrating, Irritability, Feeling sad, down, or depressed, Withdrawn, Frequent crying, Anger outbursts and " Self-injurious behavior  Radha:             No Symptoms  Psychosis:       No Symptoms  Anxiety:           Excessive worry, Nervousness, Physical complaints, such as headaches, stomachaches, muscle tension, Social anxiety, Sleep disturbance, Ruminations, Poor concentration, Irritability and Anger outbursts  Panic:              Palpitations, Tremors, Shortness of breath, Tingling, Numbness and Sense of impending doom. Was having them weekly; last one was a year ago.  Post Traumatic Stress Disorder:  No Symptoms   Eating Disorder:          No Symptoms  ADD / ADHD:              No symptoms  Conduct Disorder:       No symptoms  Autism Spectrum Disorder:     No symptoms  Obsessive Compulsive Disorder:       No Symptoms      Changes in Health Issues:   None reported    Assessment: Current Emotional / Mental Status (status of significant symptoms):  Risk status (Self / Other harm or suicidal ideation)  Patient has had a history of suicidal ideation:   and self-injurious behavior:    Patient denies current fears or concerns for personal safety.  Patient denies current or recent suicidal ideation or behaviors.  Patient denies current or recent homicidal ideation or behaviors.  Patient reports current or recent self injurious behavior or ideation including  .  Patient denies other safety concerns.  A safety and risk management plan has been developed including: Patient consented to co-developed safety plan.  A safety and risk management plan was completed.  Patient agreed to use safety plan should any safety concerns arise.  A copy was given to the patient.    Appearance:   Appropriate   Eye Contact:   Good   Psychomotor Behavior: Normal   Attitude:   Cooperative   Orientation:   All  Speech   Rate / Production: Normal    Volume:  Normal   Mood:    Normal  Affect:    Appropriate   Thought Content:  Clear   Thought Form:  Coherent  Logical   Insight:    Good     Diagnoses:  1. Moderate episode of recurrent major depressive  disorder (H)    2. Anxiety    3. Attention deficit hyperactivity disorder (ADHD), unspecified ADHD type        Collateral Reports Completed:  Communicated with: Dr. Frederick    Plan: (Homework, other):  Patient was given information about behavioral services and encouraged to schedule a follow up appointment with the clinic [facility] in conjunction with next [facility] appointment.  She was also given information about mental health symptoms and treatment options .  CD Recommendations: No indications of CD issues.      John Corona PsyD, LP      Gagandeep Corona PsyD  July 16, 2021

## 2021-07-16 ENCOUNTER — VIRTUAL VISIT (OUTPATIENT)
Dept: PSYCHIATRY | Facility: CLINIC | Age: 26
End: 2021-07-16
Payer: COMMERCIAL

## 2021-07-16 ENCOUNTER — VIRTUAL VISIT (OUTPATIENT)
Dept: PSYCHOLOGY | Facility: CLINIC | Age: 26
End: 2021-07-16
Payer: COMMERCIAL

## 2021-07-16 DIAGNOSIS — G47.00 INSOMNIA, UNSPECIFIED TYPE: ICD-10-CM

## 2021-07-16 DIAGNOSIS — F90.9 ATTENTION DEFICIT HYPERACTIVITY DISORDER (ADHD), UNSPECIFIED ADHD TYPE: ICD-10-CM

## 2021-07-16 DIAGNOSIS — R41.840 ATTENTION DEFICIT: ICD-10-CM

## 2021-07-16 DIAGNOSIS — F41.9 ANXIETY: ICD-10-CM

## 2021-07-16 DIAGNOSIS — F33.1 MODERATE EPISODE OF RECURRENT MAJOR DEPRESSIVE DISORDER (H): Primary | ICD-10-CM

## 2021-07-16 DIAGNOSIS — F41.0 PANIC ATTACK: ICD-10-CM

## 2021-07-16 DIAGNOSIS — F41.1 GAD (GENERALIZED ANXIETY DISORDER): Primary | ICD-10-CM

## 2021-07-16 DIAGNOSIS — Z86.59 HX OF MAJOR DEPRESSION: ICD-10-CM

## 2021-07-16 PROCEDURE — 99214 OFFICE O/P EST MOD 30 MIN: CPT | Mod: 95 | Performed by: PSYCHIATRY & NEUROLOGY

## 2021-07-16 PROCEDURE — 90832 PSYTX W PT 30 MINUTES: CPT | Mod: 95 | Performed by: PSYCHOLOGIST

## 2021-07-16 RX ORDER — BUSPIRONE HYDROCHLORIDE 15 MG/1
15 TABLET ORAL 2 TIMES DAILY
Qty: 180 TABLET | Refills: 0 | Status: SHIPPED | OUTPATIENT
Start: 2021-07-16 | End: 2021-11-12

## 2021-07-16 RX ORDER — CITALOPRAM HYDROBROMIDE 10 MG/1
10 TABLET ORAL DAILY
Qty: 90 TABLET | Refills: 0 | Status: SHIPPED | OUTPATIENT
Start: 2021-07-16 | End: 2021-11-05 | Stop reason: ALTCHOICE

## 2021-07-16 RX ORDER — BUPROPION HYDROCHLORIDE 100 MG/1
100 TABLET, EXTENDED RELEASE ORAL 2 TIMES DAILY
Qty: 180 TABLET | Refills: 0 | Status: SHIPPED | OUTPATIENT
Start: 2021-07-16 | End: 2022-01-14

## 2021-07-16 NOTE — Clinical Note
Please call this patient to get them scheduled for a follow-up visit in 2 months. Please schedule with me and the [facility]. Thanks!

## 2021-07-16 NOTE — PROGRESS NOTES
"Pooja High is a 26 year old year old who is being evaluated via a billable video visit.      How would you like to obtain your AVS? MyChart  If you are dropped from the video visit, the video invite should be resent to: Text to cell phone: see Epic  Will anyone else be joining your video visit? No       Telemedicine Visit: The patient's condition can be safely assessed and treated via synchronous audio and visual telemedicine encounter.      Reason for Telemedicine Visit: Covid-19 Pandemic    Originating Site (Patient Location): Patient's home     Distant Site (Provider Location): Provider Remote Setting    Mode of Communication:  Video Conference via [employer]    As the provider I attest to compliance with applicable laws and regulations related to telemedicine.        Outpatient Psychiatric Progress Note    Name: Pooja High   : 1995                    Primary Care Provider: [place] [facility]   Therapist: Seeing [facility] NEISHA Umanzor (looking for a good fit for long-term therapy)    PHQ-9 scores:  PHQ-9 SCORE 2021   PHQ-9 Total Score MyChart 18 (Moderately severe depression) 12 (Moderate depression) 12 (Moderate depression)   PHQ-9 Total Score 18 12 12       ALICE-7 scores:  ALICE-7 SCORE 2021   Total Score 17 (severe anxiety) 17 (severe anxiety) 10 (moderate anxiety)   Total Score 17 17 10       Patient Identification:  Patient is a 26 year old,   White Not  or  female  who presents for return visit with me.  Patient is currently employed full time. Patient attended the phone/video session alone. Patient prefers to be called: \"Pooja\".    Interim History:  I last saw Pooja High for outpatient psychiatry return visit on 21. During that appointment, we:      Continue buspirone 15 mg twice daily for anxiety.    Continue hydroxyzine 25-50 mg at bedtime as needed for sleep    Decrease citalopram/Celexa to 20 mg daily " "for mood    Increase Wellbutrin SR to 100 mg twice daily for mood    Continue with ADHD psychological testing    7/16: Overall patient doing quite well.  Work is going really well.  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  She also has a kitten at home which is provided a lot of emotional support as well and been helpful for her mood.  Continues to look for a good fit for therapy.  Mood took slight dip when she ran out of buspirone but not terrible.  She does hope to restart buspirone since it has been so helpful.  Second dose of Wellbutrin SR very helpful for mood, energy, motivation, and other symptoms.  No negative side effects.  Sleeping pretty good.  Anxiety manageable.  Has ADHD testing starting later this month.  She is looking forward to that.  No safety concerns.  No SI.  No problematic drug or alcohol use.    Per [facility], Dr. John Corona, during today's team-based visit:  Reported she is doing really well. Work is going well and is excited. Her mood has been stable despite running out of her buspar \"but it wasn't dramatic.\" She would like a refill. Her sleep and energy is much better since last visit. She said that extra Wellbutrin in the afternoon has really helped. She added her sleep continues to improve as well. She finally was able to move to her new apartment and it is very helpful.      Psychiatric ROS:  Pooja High reports mood has been: improved  Anxiety has been: improved  Sleep has been: Improved  Radha sxs: None  Psychosis sxs: None  ADHD/ADD sxs: Some improved focus/concentration, has ADHD testing starting in 1-2 weeks  PTSD sxs: None  PHQ9 and GAD7 scores were reviewed today if completed.   Medication side effects: Denies  Current stressors include: Symptoms and See HPI above  Coping mechanisms and supports include: Therapy, Family, Hobbies and Friends    Current medications include:   Current Outpatient Medications   Medication Sig     buPROPion (WELLBUTRIN SR) 100 MG 12 hr " tablet Take 1 tablet (100 mg) by mouth 2 times daily     busPIRone (BUSPAR) 15 MG tablet Take 1 tablet (15 mg) by mouth 2 times daily     Cholecalciferol (VITAMIN D-3 PO)      citalopram (CELEXA) 20 MG tablet Take 2 tablets (40 mg) by mouth daily     Fexofenadine HCl (ALLERGY 24-HR PO)      hydrOXYzine (VISTARIL) 25 MG capsule Take 1-2 capsules (25-50 mg) by mouth nightly as needed (sleep)     levonorgestrel (MIRENA) 20 MCG/24HR IUD 1 Device by Intrauterine route     Meclizine HCl (ANTIVERT PO)      multivitamin w/minerals (MULTI-VITAMIN) tablet Take 1 tablet by mouth daily     No current facility-administered medications for this visit.       Past Medical/Surgical History:  Past Medical History:   Diagnosis Date     Anxiety 10/17/2019     Depressive disorder      ALICE (generalized anxiety disorder) 9/22/2020     Moderate episode of recurrent major depressive disorder (H) 9/22/2020     Panic disorder       has a past medical history of Anxiety (10/17/2019), Depressive disorder, ALICE (generalized anxiety disorder) (9/22/2020), Moderate episode of recurrent major depressive disorder (H) (9/22/2020), and Panic disorder.    Social History:  Reviewed. No changes to social history except as noted above in HPI.    Vital Signs:   None. This is phone/video visit.     Labs:  Most recent laboratory results reviewed and no new labs.     Review of Systems:  10 systems (general, cardiovascular, respiratory, eyes, ENT, endocrine, GI, , M/S, neurological) were reviewed. Most pertinent finding(s) is/are: denies fever, cough, headaches, shortness of breath, chest pain, N/V, constipation/diarrhea, trouble urinating, aches and pains. The remaining systems are all unremarkable.    Mental Status Examination (limited as this is by phone/video):  Appearance: Awake, alert, appears stated age, no acute distress, well-groomed  Attitude:  cooperative, pleasant  Motor: No gross abnormalities observed via video, not formally tested  Oriented to:   person, place, time, and situation  Attention Span and Concentration:  normal  Speech:  clear, coherent, regular rate, rhythm, and volume  Language: intact  Mood:  good  Affect:  appropriate and in normal range, mood congruent and Bright  Associations:  no loose associations  Thought Process:  logical, linear and goal oriented  Thought Content:  no evidence of suicidal ideation or homicidal ideation, no evidence of psychotic thought, no auditory hallucinations present and no visual hallucinations present  Recent and Remote Memory:  Intact to interview. Not formally assessed. No amnesia.  Fund of Knowledge: appropriate  Insight:  good  Judgment:  intact, adequate for safety  Impulse Control:  intact    Suicide Risk Assessment:  Today Pooja High reports no suicidal ideation. Based on all available evidence including the factors cited above, Pooja High does not appear to be at imminent risk for self-harm, does not meet criteria for a 72-hr hold, and therefore remains appropriate for ongoing outpatient level of care.  A thorough assessment of risk factors related to suicide and self-harm have been reviewed and are noted above. The patient convincingly denies suicidality on several occasions. Local community safety resources printed and reviewed for patient to use if needed. There was no deceit detected, and the patient presented in a manner that was believable.     DSM5 Diagnosis:  300.02 (F41.1) Generalized Anxiety Disorder   History major depression  Attention deficit (Rule Out ADHD)  Insomnia, unspecified    Medical comorbidities include:   Patient Active Problem List    Diagnosis Date Noted     Moderate episode of recurrent major depressive disorder (H) 09/22/2020     Priority: Medium     ALICE (generalized anxiety disorder) 09/22/2020     Priority: Medium     Panic disorder without agoraphobia 09/22/2020     Priority: Medium     Anxiety 10/17/2019     Priority: Medium     ACP (advance care planning) 09/22/2020      Priority: Low     Health Care Home 09/22/2020     Priority: Low       Psychosocial & Contextual Factors: see HPI above    Assessment:  5/11/21  Pooja High reports overall some good improvement in her symptoms on Wellbutrin SR and with continued individual therapy.  She takes her Wellbutrin dose pretty early in the morning and so I suggest adding the afternoon dose to prevent the crash she is experiencing.  Increased fatigue and sedation midday likely due to the first Wellbutrin SR dose wearing off.  I also recommend decreasing her citalopram since we will continue to optimize Wellbutrin SR.  She is agreeable to this plan.  I am hoping she might start sleeping a little better after second dose of Wellbutrin wears off.  She will continue to monitor.  She is encouraged to continue with ADHD testing in July.  She may also be starting with a DBT specialist.  She continues to also be open to the idea of ACT therapy. Has Mirena IUD to prevent pregnancy.      7/16:  Patient overall doing quite well.  Found second dose of Wellbutrin SR to be quite helpful.  No decompensation with decreased Celexa dose and so we will continue to taper.  Can always further optimize Wellbutrin SR.  Also has ADHD testing coming up soon.  If affirmative for ADHD, might end up replacing Wellbutrin SR with stimulant medication.  Ran out of BuSpar and did notice slight dip/worsening of symptoms.  Refills sent today for buspirone.  She is encouraged to continue therapy and will continue to look for a good fit.    Medication side effects and alternatives were reviewed. Health promotion activities recommended and reviewed today. All questions addressed. Education and counseling completed regarding risks and benefits of medications and psychotherapy options. Recommend therapy for additional support.     Treatment Plan:    Continue buspirone 15 mg twice daily for anxiety.    Continue hydroxyzine 25-50 mg at bedtime as needed for sleep    Decrease  citalopram/Celexa to 10 mg daily for mood    Continue Wellbutrin SR to 100 mg twice daily for mood    Continue to proceed with ADHD psychological testing    Continue all other cares per primary care provider.     Continue all other medications as reviewed per electronic medical record today.     Safety plan reviewed. To the Emergency Department as needed or call after hours crisis line at [phone] or [phone]. [place] Crisis Text Line. Text [place] to 241067 or Suicide LifeLine Chat: suicidepreventionApplied [employer] Technologyline.org/chat    Continue therapy as planned.     Schedule an appointment with me in 2 months or sooner as needed. Call [facility] at [phone] to schedule.    Follow up with primary care provider as planned or for acute medical concerns.    Call the psychiatric nurse line with medication questions or concerns at [phone].    [employer] may be used to communicate with your provider, but this is not intended to be used for emergencies.    Administrative Billing:   Phone Call/Video Duration: 11 Minutes    Time spent with patient was 11 minutes and greater than 50% of time or 6 minutes was spent in counseling and coordination of care regarding above diagnoses and treatment plan. Patient with multiple psychiatric diagnoses and medication change adding to complexity of care.      Patient Status:  Patient will continue to be seen for ongoing consultation and stabilization.    Signed:   Magaly Frederick DO  [facility]    Disclaimer: This note consists of symbols derived from keyboarding, dictation and/or voice recognition software. As a result, there may be errors in the script that have gone undetected. Please consider this when interpreting information found in this chart.

## 2021-07-16 NOTE — PATIENT INSTRUCTIONS
Treatment Plan:    Continue buspirone 15 mg twice daily for anxiety.    Continue hydroxyzine 25-50 mg at bedtime as needed for sleep    Decrease citalopram/Celexa to 10 mg daily for mood    Continue Wellbutrin SR to 100 mg twice daily for mood    Continue to proceed with ADHD psychological testing    Continue all other cares per primary care provider.     Continue all other medications as reviewed per electronic medical record today.     Safety plan reviewed. To the Emergency Department as needed or call after hours crisis line at [phone] or [phone]. [place] Crisis Text Line. Text [place] to 501327 or Suicide LifeLine Chat: suicidepreventionlifeline.org/chat    Continue therapy as planned.     Schedule an appointment with me in 2 months or sooner as needed. Call [facility] at [phone] to schedule.    Follow up with primary care provider as planned or for acute medical concerns.    Call the psychiatric nurse line with medication questions or concerns at [phone].    [employer] may be used to communicate with your provider, but this is not intended to be used for emergencies.

## 2021-07-20 ENCOUNTER — VIRTUAL VISIT (OUTPATIENT)
Dept: BEHAVIORAL HEALTH | Facility: CLINIC | Age: 26
End: 2021-07-20
Payer: COMMERCIAL

## 2021-07-20 DIAGNOSIS — F41.0 PANIC DISORDER WITHOUT AGORAPHOBIA: ICD-10-CM

## 2021-07-20 DIAGNOSIS — F41.1 GAD (GENERALIZED ANXIETY DISORDER): ICD-10-CM

## 2021-07-20 DIAGNOSIS — F33.1 MODERATE EPISODE OF RECURRENT MAJOR DEPRESSIVE DISORDER (H): Primary | ICD-10-CM

## 2021-07-20 PROCEDURE — 90834 PSYTX W PT 45 MINUTES: CPT | Mod: 95 | Performed by: MARRIAGE & FAMILY THERAPIST

## 2021-07-20 ASSESSMENT — ANXIETY QUESTIONNAIRES
1. FEELING NERVOUS, ANXIOUS, OR ON EDGE: MORE THAN HALF THE DAYS
1. FEELING NERVOUS, ANXIOUS, OR ON EDGE: MORE THAN HALF THE DAYS
5. BEING SO RESTLESS THAT IT IS HARD TO SIT STILL: SEVERAL DAYS
5. BEING SO RESTLESS THAT IT IS HARD TO SIT STILL: SEVERAL DAYS
GAD7 TOTAL SCORE: 10
GAD7 TOTAL SCORE: 10
4. TROUBLE RELAXING: MORE THAN HALF THE DAYS
6. BECOMING EASILY ANNOYED OR IRRITABLE: SEVERAL DAYS
GAD7 TOTAL SCORE: 10
8. IF YOU CHECKED OFF ANY PROBLEMS, HOW DIFFICULT HAVE THESE MADE IT FOR YOU TO DO YOUR WORK, TAKE CARE OF THINGS AT HOME, OR GET ALONG WITH OTHER PEOPLE?: SOMEWHAT DIFFICULT
GAD7 TOTAL SCORE: 10
6. BECOMING EASILY ANNOYED OR IRRITABLE: SEVERAL DAYS
7. FEELING AFRAID AS IF SOMETHING AWFUL MIGHT HAPPEN: SEVERAL DAYS
7. FEELING AFRAID AS IF SOMETHING AWFUL MIGHT HAPPEN: SEVERAL DAYS
2. NOT BEING ABLE TO STOP OR CONTROL WORRYING: MORE THAN HALF THE DAYS
GAD7 TOTAL SCORE: 10
3. WORRYING TOO MUCH ABOUT DIFFERENT THINGS: SEVERAL DAYS
8. IF YOU CHECKED OFF ANY PROBLEMS, HOW DIFFICULT HAVE THESE MADE IT FOR YOU TO DO YOUR WORK, TAKE CARE OF THINGS AT HOME, OR GET ALONG WITH OTHER PEOPLE?: SOMEWHAT DIFFICULT
6. BECOMING EASILY ANNOYED OR IRRITABLE: SEVERAL DAYS
4. TROUBLE RELAXING: MORE THAN HALF THE DAYS
4. TROUBLE RELAXING: MORE THAN HALF THE DAYS
5. BEING SO RESTLESS THAT IT IS HARD TO SIT STILL: SEVERAL DAYS
8. IF YOU CHECKED OFF ANY PROBLEMS, HOW DIFFICULT HAVE THESE MADE IT FOR YOU TO DO YOUR WORK, TAKE CARE OF THINGS AT HOME, OR GET ALONG WITH OTHER PEOPLE?: SOMEWHAT DIFFICULT
7. FEELING AFRAID AS IF SOMETHING AWFUL MIGHT HAPPEN: SEVERAL DAYS
7. FEELING AFRAID AS IF SOMETHING AWFUL MIGHT HAPPEN: SEVERAL DAYS
1. FEELING NERVOUS, ANXIOUS, OR ON EDGE: MORE THAN HALF THE DAYS
7. FEELING AFRAID AS IF SOMETHING AWFUL MIGHT HAPPEN: SEVERAL DAYS
2. NOT BEING ABLE TO STOP OR CONTROL WORRYING: MORE THAN HALF THE DAYS
3. WORRYING TOO MUCH ABOUT DIFFERENT THINGS: SEVERAL DAYS
GAD7 TOTAL SCORE: 10
GAD7 TOTAL SCORE: 10
7. FEELING AFRAID AS IF SOMETHING AWFUL MIGHT HAPPEN: SEVERAL DAYS
GAD7 TOTAL SCORE: 10
GAD7 TOTAL SCORE: 10
2. NOT BEING ABLE TO STOP OR CONTROL WORRYING: MORE THAN HALF THE DAYS
3. WORRYING TOO MUCH ABOUT DIFFERENT THINGS: SEVERAL DAYS

## 2021-07-20 ASSESSMENT — PATIENT HEALTH QUESTIONNAIRE - PHQ9
SUM OF ALL RESPONSES TO PHQ QUESTIONS 1-9: 10
SUM OF ALL RESPONSES TO PHQ QUESTIONS 1-9: 10
10. IF YOU CHECKED OFF ANY PROBLEMS, HOW DIFFICULT HAVE THESE PROBLEMS MADE IT FOR YOU TO DO YOUR WORK, TAKE CARE OF THINGS AT HOME, OR GET ALONG WITH OTHER PEOPLE: SOMEWHAT DIFFICULT
SUM OF ALL RESPONSES TO PHQ QUESTIONS 1-9: 10
SUM OF ALL RESPONSES TO PHQ QUESTIONS 1-9: 10
10. IF YOU CHECKED OFF ANY PROBLEMS, HOW DIFFICULT HAVE THESE PROBLEMS MADE IT FOR YOU TO DO YOUR WORK, TAKE CARE OF THINGS AT HOME, OR GET ALONG WITH OTHER PEOPLE: SOMEWHAT DIFFICULT

## 2021-07-20 NOTE — PROGRESS NOTES
[facility] Primary Care: Integrated Behavioral Health  July 20th 2021  Telemedicine Visit: The patient's condition can be safely assessed and treated via synchronous audio and visual telemedicine encounter.      Reason for Telemedicine Visit: Services only offered telehealth    Originating Site (Patient Location): Patient's place of employment    Distant Site (Provider Location): [facility]    Consent:  The patient/guardian has verbally consented to: the potential risks and benefits of telemedicine (video visit) versus in person care; bill my insurance or make self-payment for services provided; and responsibility for payment of non-covered services.     Mode of Communication:  Video Conference via Darkstrand    As the provider I attest to compliance with applicable laws and regulations related to telemedicine.      Behavioral Health Clinician Progress Note    Patient Name: Pooja High           Service Type:  Individual      Service Location:   Face to Face in Home / Community     Session Start Time: 1:01 pm  Session End Time: 1:49 pm      Session Length: 38 - 52      Attendees: Client    Visit Activities (Refresh list every visit): [facility] Only    Diagnostic Assessment Date: John Corona on 4/12/21  Treatment Plan Review Date: n/a  See Flowsheets for today's PHQ-9 and ALICE-7 results  Previous PHQ-9:   PHQ-9 SCORE 5/26/2021 7/20/2021 7/20/2021   PHQ-9 Total Score MyChart 12 (Moderate depression) - 10 (Moderate depression)   PHQ-9 Total Score 12 10 10     Previous ALICE-7:   ALICE-7 SCORE 7/20/2021 7/20/2021 7/20/2021   Total Score - - 10 (moderate anxiety)   Total Score 10 10 10       TOM LEVEL:  No flowsheet data found.    DATA  Extended Session (60+ minutes): No  Interactive Complexity: No  Crisis: No  [facility] Patient: No    Treatment Objective(s) Addressed in This Session:  Target Behavior(s): n/a    Depressed Mood: Increase interest, engagement, and pleasure in doing  "things  Decrease frequency and intensity of feeling down, depressed, hopeless  Improve quantity and quality of night time sleep / decrease daytime naps  Feel less tired and more energy during the day   Improve diet, appetite, mindful eating, and / or meal planning  Identify negative self-talk and behaviors: challenge core beliefs, myths, and actions  Improve concentration, focus, and mindfulness in daily activities   Feel less fidgety, restless or slow in daily activities / interpersonal interactions  Decrease thoughts that you'd be better off dead or of suicide / self-harm  Discuss motivation / ambivalence about taking anti-depressant / mood stabilizer medication(s)  Discuss motivation / ambivalence about a referral to specialty mental health services (Therapy, Day Tx, PHP)  Anxiety: will experience a reduction in anxiety, will develop more effective coping skills to manage anxiety symptoms, will develop healthy cognitive patterns and beliefs and will increase ability to function adaptively  Risk / Safety: will develop strategies for more effective management of risk issues and will follow safety plan (in EMR) for more effective management of risk issues    Current Stressors / Issues:    Processed not feeling heard from the therapist she tired. Discussed what she is looking for and talked about other referrals.   Processed her relationship with her brother. Fitting in is about assessing a situation and becoming who you need to be to be accepted. Belonging, on the other hand, doesn t require us to change who we are; it requires us to be who we are. Explained to continue we need to do a formal DA and Treat goals. States she understands. Reviewed safety with Patient. Patient denies any current SI,plans or intentions. Pt denies any current self-injurious behavior.\" I don't even remember the last time.\"  Pt will follow up on referrals given today.     Progress on Treatment Objective(s) / Homework:  n/a    Motivational " Interviewing    MI Intervention: Expressed Empathy/Understanding, Supported Autonomy, Collaboration, Evocation, Permission to raise concern or advise, Open-ended questions and Reflections: simple and complex     Change Talk Expressed by the Patient: Desire to change Ability to change Reasons to change    Provider Response to Change Talk: E - Evoked more info from patient about behavior change      Situation        Automatic Thoughts  Cognitive Distortions      Feelings        Behavior        Questioning Thoughts            Also provided psychoeducation about behavioral health condition, symptoms, and treatment options    Care Plan review completed: No    Medication Review:  Changes to psychiatric medications, see updated Medication List in EPIC.     Medication Compliance:  Yes    Changes in Health Issues:   None reported    Chemical Use Review:   Substance Use: Chemical use reviewed, no active concerns identified      Tobacco Use: No current tobacco use.      Assessment: Current Emotional / Mental Status (status of significant symptoms):  Risk status (Self / Other harm or suicidal ideation)  Patient has had a history of suicidal ideation: 2013 thoughts of slitting wrist and self-injurious behavior: scratching  Patient denies current fears or concerns for personal safety.  Patient denies current or recent suicidal ideation or behaviors.  Patient denies current or recent homicidal ideation or behaviors.  Patient reports current or recent self injurious behavior or ideation including scratching.  Patient denies other safety concerns.  A safety and risk management plan has been developed including: Patient consented to co-developed safety plan.  A safety and risk management plan was completed.  Patient agreed to use safety plan should any safety concerns arise.  A copy was given to the patient.  [place] Suicide Severity Rating Scale (Lifetime/Recent)  [place] Suicide Severity Rating (Lifetime/Recent) 4/12/2021   1.  Wish to be Dead (Lifetime) Yes   Wish to be Dead Description (Lifetime) 2013   1. Wish to be Dead (Recent) No   2. Non-Specific Active Suicidal Thoughts (Lifetime) Yes   2. Non-Specific Active Suicidal Thoughts (Recent) No   3. Active Suicidal Ideation with any Methods (Not Plan) Without Intent to Act (Lifetime) Yes   3. Active Suicidal Ideation with any Methods (Not Plan) Without Intent to Act (Recent) No   4. Active Suicidal Ideation with Some Intent to Act, Without Specific Plan (Lifetime) No   4. Active Suicidal Ideation with Some Intent to Act, Without Specific Plan (Recent) No   5. Active Suicidal Ideation with Specific Plan and Intent (Lifetime) Yes   Active Suicidal Ideation with Specific Plan and Intent Description (Lifetime) 2013 - had plan to slit wrists; told friend who took her to ER   5. Active Suicidal Ideation with Specific Plan and Intent (Recent) No   Most Severe Ideation Rating (Lifetime) 4   Frequency (Lifetime) 1   Duration (Lifetime) 3   Controllability (Lifetime) 4   Protective Factors  (Lifetime) 4   Most Severe Ideation Rating (Past Month) NA   Frequency (Past Month) NA   Duration (Past Month) NA   Controllability (Past Month) NA   Protective Factors (Past Month) NA   Reasons for Ideation (Past Month) NA   Actual Attempt (Lifetime) No   Actual Attempt (Past 3 Months) No   Has subject engaged in non-suicidal self-injurious behavior? (Lifetime) No   Has subject engaged in non-suicidal self-injurious behavior? (Past 3 Months) Yes   Interrupted Attempts (Lifetime) Yes   Interrupted Attempt Description (Lifetime) 2013 - had plan to slit wrists; told friend who took her to ER   Total Number of Interrupted Attempts (Lifetime) 1   Interrupted Attempts (Past 3 Months) No   Aborted or Self-Interrupted Attempt (Lifetime) No   Aborted or Self-Interrupted Attempt (Past 3 Months) No   Preparatory Acts or Behavior (Lifetime) No   Preparatory Acts or Behavior (Past 3 Months) No   Most Lethal Attempt Actual  "Lethality Code NA   Initial/First Attempt Actual Lethality Code NA       Appearance:   Appropriate   Eye Contact:   Good   Psychomotor Behavior: Normal   Attitude:   Cooperative   Orientation:   All  Speech   Rate / Production: Normal    Volume:  Normal   Mood:    Anxious   Affect:    Appropriate   Thought Content:  Clear   Thought Form:  Coherent  Logical   Insight:    Fair     Diagnoses:  1. Moderate episode of recurrent major depressive disorder (H)    2. ALICE (generalized anxiety disorder)    3. Panic disorder without agoraphobia        Collateral Reports Completed:  Communicated with: John Corona and Dr. Frederick     Plan: (Homework, other):  Patient was given information about behavioral services and encouraged to schedule a follow up appointment with the clinic [facility] transfer.  She was also given information about mental health symptoms and treatment options  and Cognitive Behavioral Therapy skills to practice when experiencing anxiety and depression.  CD Recommendations: No indications of CD issues.  NEISHA Umanzor, [facility]      ______________________________________________________________________        Integrated Behavioral Health Services                                      Patient's Name: Pooja High  April 14, 2021    SAFETY PLAN:  Step 1: Warning signs / cues (Thoughts, images, mood, situation, behavior) that a crisis may be developing:    Thoughts: \"I don't matter\" and If I was not here it would not matter    Images: denied    Thinking Processes: ruminations (can't stop thinking about my problems): work , racing thoughts and highly critical and negative thoughts: about herself    Mood: worsening depression and intense worry    Behaviors: isolating/withdrawing , not taking care of myself, not sleeping enough and hard getting tasks done    Situations: relationship problems and hearing about others family members problems, and Work difficulties   Step 2: Coping strategies - Things I can do to take my mind " "off of my problems without contacting another person (relaxation technique, physical activity):    Distress Tolerance Strategies:  arts and crafts: coloring books, watch a funny movie: netflix, change body temperature (ice pack/cold water) , paced breathing/progressive muscle relaxation and Apps- Calm, Headspace and Calm  Try a rubber band on wrist.     Physical Activities: go for a walk, deep breathing and stretching     Focus on helpful thoughts:  remind myself of what is important to me: my son  Step 3: People and social settings that provide distraction:   Name: Dede  Phone: in her phone   Name: Dao Phone: in her [employer]   work and [employer]   Step 4: Remind myself of people and things that are important to me and worth living for:    My son and my kids at my job    Step 5: When I am in crisis, I can ask these people to help me use my safety plan:   Name: Dede   Phone: in phone   Name: Dao Phone: in phone     Step 6: Making the environment safe:     be around others  Step 7: Professionals or agencies I can contact during a crisis:    Suicide Prevention Lifeline: 1-546-614-WBLC (6491)    Crisis Text Line Service: Text   [place]  to 886723.  Local Crisis Services: If you are in immediate danger, call 911.  If you or someone you know is experiencing a mental health crisis and you need help, the following crisis  hotlines are available to help.    Suicide Prevention Lifeline: 4-633-587-NAOZ (7035)    Crisis Text Line Service: Text \"[place]\" to 205595.    [facility] Emergency Department  Behavioral Emergency Center  [facility] [address]  [phone]  [facility]  [facility] Crisis Response Services  (Adults & Children)  [phone]  [facility] -  Acute Psychiatric Services (APS)  Assessment & Referral: [phone]  Suicide Hotline: 948.859.6392    [facility] Crisis " Team  [phone]    [facility] Adult Mental Health Services   [phone]  Referrals: [phone]  Crisis: [phone]    [facility]    Emergency Department  [address] [place] [address]  [phone]  [place] [employer]    [phone] ([phone])  [facility] Crisis Response Unit    [phone]  [facility] Outreach for Psychiatric Emergencies  [phone]  [facility]  [facility] Adult Mental Health Services - Crisis Services (24/7)  Information & Referrals: [phone]  Crisis Line: [phone]    [facility] Center (24/7)  [phone] TDD    Call 911 or go to my nearest emergency department.   I helped develop this safety plan and agree to use it when needed.  I have been given a copy of this plan.      Patient signature: ____verbally agreed. Sent by [place]__________________________________________________________  Today s date:  April 14, 2021  Adapted from Safety Plan Template 2008 Jess Bedoya and Cornelio Alvarez is reprinted with the express permission of the authors.  No portion of the Safety Plan Template may be reproduced without the express, written permission.  You can contact the authors at [email or web address] or [email or web address].

## 2021-07-21 ENCOUNTER — VIRTUAL VISIT (OUTPATIENT)
Dept: PSYCHOLOGY | Facility: CLINIC | Age: 26
End: 2021-07-21
Attending: PSYCHIATRY & NEUROLOGY
Payer: COMMERCIAL

## 2021-07-21 DIAGNOSIS — Z13.39 ATTENTION DEFICIT HYPERACTIVITY DISORDER (ADHD) EVALUATION: ICD-10-CM

## 2021-07-21 DIAGNOSIS — Z86.59 HISTORY OF POSTTRAUMATIC STRESS DISORDER (PTSD): ICD-10-CM

## 2021-07-21 DIAGNOSIS — F33.1 MODERATE EPISODE OF RECURRENT MAJOR DEPRESSIVE DISORDER (H): Primary | ICD-10-CM

## 2021-07-21 PROCEDURE — 90837 PSYTX W PT 60 MINUTES: CPT | Mod: 95 | Performed by: PSYCHOLOGIST

## 2021-07-21 ASSESSMENT — PATIENT HEALTH QUESTIONNAIRE - PHQ9
SUM OF ALL RESPONSES TO PHQ QUESTIONS 1-9: 10
SUM OF ALL RESPONSES TO PHQ QUESTIONS 1-9: 10

## 2021-07-21 ASSESSMENT — ANXIETY QUESTIONNAIRES
GAD7 TOTAL SCORE: 10

## 2021-07-21 NOTE — PROGRESS NOTES
Progress Note - Initial Visit    Client Name:  Pooja High Date: 2021         Service Type: Individual     Visit Start Time: 1206 Visit End Time: 1300    Visit #: 1    Attendees: Client    Service Modality:  Video Visit:      Provider verified identity through the following two step process.  Patient provided:  Patient     Telemedicine Visit: The patient's condition can be safely assessed and treated via synchronous audio and visual telemedicine encounter.      Reason for Telemedicine Visit: Services only offered telehealth    Originating Site (Patient Location): Patient's home    Distant Site (Provider Location): Provider Remote Setting- Home Office    Consent:  The patient/guardian has verbally consented to: the potential risks and benefits of telemedicine (video visit) versus in person care; bill my insurance or make self-payment for services provided; and responsibility for payment of non-covered services.     Patient would like the video invitation sent by:  My Chart    Mode of Communication:  Video Conference via Amwell    As the provider I attest to compliance with applicable laws and regulations related to telemedicine.       DATA:   Interactive Complexity: No   Crisis: No  Extended Session (53+ minutes):   - Treatment protocol required additional time to complete, due to the nature of diagnosis being treated.  See Interventions section for details      Presenting Concerns/  Current Stressors:   ADHD Evaluation     ASSESSMENT:  Mental Status Assessment:  Appearance:   Appropriate   Eye Contact:   Good   Psychomotor Behavior: Restless   Attitude:   Cooperative   Orientation:   All  Speech   Rate / Production: Normal/ Responsive   Volume:  Normal   Mood:    Anxious  Depressed   Affect:    Appropriate   Thought Content:  Clear   Thought Form:  Goal Directed   Insight:    Fair     Answers for HPI/ROS submitted by the patient on 2021  If you checked off any problems, how difficult  have these problems made it for you to do your work, take care of things at home, or get along with other people?: Somewhat difficult  PHQ9 TOTAL SCORE: 10  ALICE 7 TOTAL SCORE: 10    Safety Issues and Plan for Safety and Risk Management:     [place] Suicide Severity Rating Scale (Short Version)  [place] Suicide Severity Rating (Short Version) 4/11/2020 7/21/2021   Over the past 2 weeks have you felt down, depressed, or hopeless? no yes   Over the past 2 weeks have you had thoughts of killing yourself? no no   Have you ever attempted to kill yourself? no no     Patient denies current fears or concerns for personal safety.  Patient denies current or recent suicidal ideation or behaviors.  Patient denies current or recent homicidal ideation or behaviors.  Patient acknowledged engaging in scratching behavior; she denied recent urges (last behavior 2 months ago).  Patient denies other safety concerns.  Recommended that patient call 911 or go to the local ED should there be a change in any of these risk factors.  Patient reports there are no firearms in the house.     Diagnostic Criteria:  Major Depressive Disorder  Anxiety  Hx of Trauma  Rule Out ADHD     WHODAS 2.0 (12 item):   WHODAS 2.0 Total Score 4/12/2021 4/14/2021   Total Score 26 26   Total Score MyChart 26 26     Intervention:   During today's session, this writer outlined the expectations and purpose of the ADHD Evaluation including Notice of Billing. Ms. High discuss her reason for referral and symptoms. Her PHQ-9 and ALICE-7 scores were reviewed as well as risk. This writer used the Adult ADHD Evaluation Intake Form to guide the clinical interview; it was not finished. This writer discussed trauma with Ms. High. A second session was scheduled and a third may be needed to finish the clinical interview. .   Collateral Reports Completed:  Routed note to Care Team Member(s)      PLAN: (Homework, other):  1. Provider will continue Diagnostic Assessment.         Yessenia Dykes, PhD LP  July 21, 2021

## 2021-07-28 ENCOUNTER — VIRTUAL VISIT (OUTPATIENT)
Dept: PSYCHOLOGY | Facility: CLINIC | Age: 26
End: 2021-07-28
Attending: PSYCHIATRY & NEUROLOGY
Payer: COMMERCIAL

## 2021-07-28 DIAGNOSIS — Z13.39 ATTENTION DEFICIT HYPERACTIVITY DISORDER (ADHD) EVALUATION: ICD-10-CM

## 2021-07-28 DIAGNOSIS — Z86.59 HISTORY OF POSTTRAUMATIC STRESS DISORDER (PTSD): Primary | ICD-10-CM

## 2021-07-28 DIAGNOSIS — F33.1 MODERATE EPISODE OF RECURRENT MAJOR DEPRESSIVE DISORDER (H): ICD-10-CM

## 2021-07-28 PROCEDURE — 90834 PSYTX W PT 45 MINUTES: CPT | Mod: 95 | Performed by: PSYCHOLOGIST

## 2021-07-28 ASSESSMENT — PATIENT HEALTH QUESTIONNAIRE - PHQ9
SUM OF ALL RESPONSES TO PHQ QUESTIONS 1-9: 11
10. IF YOU CHECKED OFF ANY PROBLEMS, HOW DIFFICULT HAVE THESE PROBLEMS MADE IT FOR YOU TO DO YOUR WORK, TAKE CARE OF THINGS AT HOME, OR GET ALONG WITH OTHER PEOPLE: SOMEWHAT DIFFICULT
SUM OF ALL RESPONSES TO PHQ QUESTIONS 1-9: 11

## 2021-07-28 NOTE — PROGRESS NOTES
Progress Note - Initial Visit    Client Name:  Pooja High Date: 2021         Service Type: Individual     Visit Start Time:  Visit End Time: 75    Visit #: 1    Attendees: Client    Service Modality:  Video Visit:      Provider verified identity through the following two step process.  Patient provided:  Patient     Telemedicine Visit: The patient's condition can be safely assessed and treated via synchronous audio and visual telemedicine encounter.      Reason for Telemedicine Visit: Services only offered telehealth    Originating Site (Patient Location): Patient's place of employment    Distant Site (Provider Location): Provider Remote Setting- Home Office    Consent:  The patient/guardian has verbally consented to: the potential risks and benefits of telemedicine (video visit) versus in person care; bill my insurance or make self-payment for services provided; and responsibility for payment of non-covered services.     Patient would like the video invitation sent by:  My Chart    Mode of Communication:  Video Conference via Amwell    As the provider I attest to compliance with applicable laws and regulations related to telemedicine.       DATA:   Interactive Complexity: No   Crisis: No     Presenting Concerns/  Current Stressors:   ADHD Evaluation    ASSESSMENT:  Mental Status Assessment:  Appearance:   Appropriate   Eye Contact:   Fair   Psychomotor Behavior: Normal   Attitude:   Cooperative   Orientation:   All  Speech   Rate / Production: Normal/ Responsive   Volume:  Normal   Mood:    Anxious  Depressed   Affect:    Appropriate   Thought Content:  Clear   Thought Form:  Goal Directed   Insight:    Good  and Fair     Answers for HPI/ROS submitted by the patient on 2021  If you checked off any problems, how difficult have these problems made it for you to do your work, take care of things at home, or get along with other people?: Somewhat difficult  PHQ9 TOTAL SCORE: 11  ALICE  7 TOTAL SCORE: 10    Safety Issues and Plan for Safety and Risk Management:     [place] Suicide Severity Rating Scale (Short Version)  [place] Suicide Severity Rating (Short Version) 4/11/2020 7/21/2021   Over the past 2 weeks have you felt down, depressed, or hopeless? no yes   Over the past 2 weeks have you had thoughts of killing yourself? no no   Have you ever attempted to kill yourself? no no     Patient denies current fears or concerns for personal safety.  Patient denies current or recent suicidal ideation or behaviors.  Patient denies current or recent homicidal ideation or behaviors.  Patient denies current or recent self injurious behavior or ideation.  Patient denies other safety concerns.  Recommended that patient call 911 or go to the local ED should there be a change in any of these risk factors.     Diagnostic Criteria:  MDD  Hx trauma  Rule Out ADHD    WHODAS 2.0 (12 item):   WHODAS 2.0 Total Score 4/12/2021 4/14/2021   Total Score 26 26   Total Score MyChart 26 26     Intervention:   During today's session, this writer worked with Ms. High to complete the Adult ADHD Evaluation Intake Form; it was finished. She also provided general background information including development and interpersonal. The clinical interview was not completed. She was scheduled for a third session. She was sent the Rose Reports for completion between sessions.   Collateral Reports Completed:  Routed note to Care Team Member(s)      PLAN: (Homework, other):  1. Provider will continue Diagnostic Assessment.    2. Complete Rose Reports.    Yessenia Dykes, PhD LP  July 28, 2021

## 2021-07-29 ASSESSMENT — PATIENT HEALTH QUESTIONNAIRE - PHQ9: SUM OF ALL RESPONSES TO PHQ QUESTIONS 1-9: 11

## 2021-08-13 ENCOUNTER — VIRTUAL VISIT (OUTPATIENT)
Dept: PSYCHOLOGY | Facility: CLINIC | Age: 26
End: 2021-08-13
Payer: COMMERCIAL

## 2021-08-13 DIAGNOSIS — F43.10 POSTTRAUMATIC STRESS DISORDER: ICD-10-CM

## 2021-08-13 DIAGNOSIS — F33.1 MODERATE EPISODE OF RECURRENT MAJOR DEPRESSIVE DISORDER (H): Primary | ICD-10-CM

## 2021-08-13 DIAGNOSIS — Z13.39 ATTENTION DEFICIT HYPERACTIVITY DISORDER (ADHD) EVALUATION: ICD-10-CM

## 2021-08-13 PROCEDURE — 90791 PSYCH DIAGNOSTIC EVALUATION: CPT | Mod: GT | Performed by: PSYCHOLOGIST
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
## 2021-08-14 ENCOUNTER — TELEPHONE (OUTPATIENT)
Dept: PSYCHOLOGY | Facility: CLINIC | Age: 26
End: 2021-08-14

## 2021-09-05 ENCOUNTER — HEALTH MAINTENANCE LETTER (OUTPATIENT)
Age: 26
End: 2021-09-05

## 2021-09-22 ENCOUNTER — OFFICE VISIT (OUTPATIENT)
Dept: INTERNAL MEDICINE | Facility: CLINIC | Age: 26
End: 2021-09-22
Payer: COMMERCIAL

## 2021-09-22 VITALS
HEART RATE: 65 BPM | DIASTOLIC BLOOD PRESSURE: 66 MMHG | WEIGHT: 174 LBS | RESPIRATION RATE: 20 BRPM | HEIGHT: 64 IN | OXYGEN SATURATION: 100 % | BODY MASS INDEX: 29.71 KG/M2 | TEMPERATURE: 98.5 F | SYSTOLIC BLOOD PRESSURE: 104 MMHG

## 2021-09-22 DIAGNOSIS — E55.9 VITAMIN D DEFICIENCY: ICD-10-CM

## 2021-09-22 DIAGNOSIS — Z11.4 ENCOUNTER FOR SCREENING FOR HIV: ICD-10-CM

## 2021-09-22 DIAGNOSIS — Z11.59 NEED FOR HEPATITIS C SCREENING TEST: ICD-10-CM

## 2021-09-22 DIAGNOSIS — Z11.51 SPECIAL SCREENING EXAMINATION FOR HUMAN PAPILLOMAVIRUS (HPV): ICD-10-CM

## 2021-09-22 DIAGNOSIS — Z00.00 ROUTINE GENERAL MEDICAL EXAMINATION AT A HEALTH CARE FACILITY: Primary | ICD-10-CM

## 2021-09-22 LAB
BASOPHILS # BLD AUTO: 0 10E3/UL (ref 0–0.2)
BASOPHILS NFR BLD AUTO: 0 %
EOSINOPHIL # BLD AUTO: 0 10E3/UL (ref 0–0.7)
EOSINOPHIL NFR BLD AUTO: 1 %
ERYTHROCYTE [DISTWIDTH] IN BLOOD BY AUTOMATED COUNT: 11.5 % (ref 10–15)
HCT VFR BLD AUTO: 40.5 % (ref 35–47)
HGB BLD-MCNC: 13.6 G/DL (ref 11.7–15.7)
LYMPHOCYTES # BLD AUTO: 1.5 10E3/UL (ref 0.8–5.3)
LYMPHOCYTES NFR BLD AUTO: 19 %
MCH RBC QN AUTO: 31.6 PG (ref 26.5–33)
MCHC RBC AUTO-ENTMCNC: 33.6 G/DL (ref 31.5–36.5)
MCV RBC AUTO: 94 FL (ref 78–100)
MONOCYTES # BLD AUTO: 0.7 10E3/UL (ref 0–1.3)
MONOCYTES NFR BLD AUTO: 8 %
NEUTROPHILS # BLD AUTO: 6 10E3/UL (ref 1.6–8.3)
NEUTROPHILS NFR BLD AUTO: 73 %
PLATELET # BLD AUTO: 357 10E3/UL (ref 150–450)
RBC # BLD AUTO: 4.31 10E6/UL (ref 3.8–5.2)
WBC # BLD AUTO: 8.3 10E3/UL (ref 4–11)

## 2021-09-22 PROCEDURE — 87389 HIV-1 AG W/HIV-1&-2 AB AG IA: CPT | Performed by: NURSE PRACTITIONER

## 2021-09-22 PROCEDURE — 82306 VITAMIN D 25 HYDROXY: CPT | Performed by: NURSE PRACTITIONER

## 2021-09-22 PROCEDURE — 86803 HEPATITIS C AB TEST: CPT | Performed by: NURSE PRACTITIONER

## 2021-09-22 PROCEDURE — 36415 COLL VENOUS BLD VENIPUNCTURE: CPT | Performed by: NURSE PRACTITIONER

## 2021-09-22 PROCEDURE — 80050 GENERAL HEALTH PANEL: CPT | Performed by: NURSE PRACTITIONER

## 2021-09-22 PROCEDURE — 99395 PREV VISIT EST AGE 18-39: CPT | Performed by: NURSE PRACTITIONER

## 2021-09-22 PROCEDURE — G0145 SCR C/V CYTO,THINLAYER,RESCR: HCPCS | Performed by: NURSE PRACTITIONER

## 2021-09-22 ASSESSMENT — MIFFLIN-ST. JEOR: SCORE: 1496.32

## 2021-09-22 NOTE — NURSING NOTE
"Chief Complaint   Patient presents with     Gyn Exam     wants thyroid checked     initial /66   Pulse 65   Temp 98.5  F (36.9  C) (Tympanic)   Resp 20   Ht 1.613 m (5' 3.5\")   Wt 78.9 kg (174 lb)   SpO2 100%   BMI 30.34 kg/m   Estimated body mass index is 30.34 kg/m  as calculated from the following:    Height as of this encounter: 1.613 m (5' 3.5\").    Weight as of this encounter: 78.9 kg (174 lb)..  bp completed using cuff size large  OSMANY ROSE LPN  "

## 2021-09-22 NOTE — PROGRESS NOTES
"    Assessment & Plan     Routine general medical examination at a health care facility    - Vitamin D Deficiency; Future  - TSH with free T4 reflex; Future  - Comprehensive metabolic panel (BMP + Alb, Alk Phos, ALT, AST, Total. Bili, TP); Future  - CBC with platelets and differential; Future  - HIV Antigen Antibody Combo; Future  - Hepatitis C Screen Reflex to HCV RNA Quant and Genotype; Future  - Pap screen reflex to HPV if ASCUS - recommend age 25 - 29  - Vitamin D Deficiency  - TSH with free T4 reflex  - Comprehensive metabolic panel (BMP + Alb, Alk Phos, ALT, AST, Total. Bili, TP)  - CBC with platelets and differential  - HIV Antigen Antibody Combo  - Hepatitis C Screen Reflex to HCV RNA Quant and Genotype    Encounter for screening for HIV    - HIV Antigen Antibody Combo; Future  - HIV Antigen Antibody Combo    Need for hepatitis C screening test    - Hepatitis C Screen Reflex to HCV RNA Quant and Genotype; Future  - Hepatitis C Screen Reflex to HCV RNA Quant and Genotype    Vitamin D deficiency    - Vitamin D Deficiency; Future  - Vitamin D Deficiency    Special screening examination for human papillomavirus (HPV)    - Pap screen reflex to HPV if ASCUS - recommend age 25 - 29      [id number]}     BMI:   Estimated body mass index is 30.34 kg/m  as calculated from the following:    Height as of this encounter: 1.613 m (5' 3.5\").    Weight as of this encounter: 78.9 kg (174 lb).       Patient Instructions   Lab in [address]          Return in about 1 year (around 9/22/2022).    ANNMARIE Rizvi CNP  [facility] is a 26 year old who presents for the following health issues     HPI   Chief Complaint   Patient presents with     Gyn Exam     wants thyroid checked    Donated white blood cells and told she could have thyroid issue      [facility] provider Friday   wellbutrin   celexa   hydroxyzine   buspar     mirena in place       Review of Systems   Constitutional, " "HEENT, cardiovascular, pulmonary, GI, , musculoskeletal, neuro, skin, endocrine and psych systems are negative, except as otherwise noted.      Objective    /66   Pulse 65   Temp 98.5  F (36.9  C) (Tympanic)   Resp 20   Ht 1.613 m (5' 3.5\")   Wt 78.9 kg (174 lb)   SpO2 100%   BMI 30.34 kg/m    Body mass index is 30.34 kg/m .  Physical Exam   GENERAL: alert and no distress  RESP: lungs clear to auscultation - no rales, rhonchi or wheezes  CV: regular rate and rhythm  ABDOMEN: soft, nontender, no hepatosplenomegaly, no masses and bowel sounds normal   (female): normal female external genitalia, normal urethral meatus, vaginal mucosa, normal cervix/adnexa/uterus without masses or discharge  PSYCH: mentation appears normal, affect normal/bright    Lab             "

## 2021-09-23 LAB
ALBUMIN SERPL-MCNC: 4.4 G/DL (ref 3.4–5)
ALP SERPL-CCNC: 63 U/L (ref 40–150)
ALT SERPL W P-5'-P-CCNC: 43 U/L (ref 0–50)
ANION GAP SERPL CALCULATED.3IONS-SCNC: 8 MMOL/L (ref 3–14)
AST SERPL W P-5'-P-CCNC: 30 U/L (ref 0–45)
BILIRUB SERPL-MCNC: 0.4 MG/DL (ref 0.2–1.3)
BUN SERPL-MCNC: 12 MG/DL (ref 7–30)
CALCIUM SERPL-MCNC: 9.6 MG/DL (ref 8.5–10.1)
CHLORIDE BLD-SCNC: 107 MMOL/L (ref 94–109)
CO2 SERPL-SCNC: 23 MMOL/L (ref 20–32)
CREAT SERPL-MCNC: 0.79 MG/DL (ref 0.52–1.04)
DEPRECATED CALCIDIOL+CALCIFEROL SERPL-MC: 34 UG/L (ref 20–75)
GFR SERPL CREATININE-BSD FRML MDRD: >90 ML/MIN/1.73M2
GLUCOSE BLD-MCNC: 75 MG/DL (ref 70–99)
HCV AB SERPL QL IA: NONREACTIVE
HIV 1+2 AB+HIV1 P24 AG SERPL QL IA: NONREACTIVE
POTASSIUM BLD-SCNC: 4.1 MMOL/L (ref 3.4–5.3)
PROT SERPL-MCNC: 8.5 G/DL (ref 6.8–8.8)
SODIUM SERPL-SCNC: 138 MMOL/L (ref 133–144)
TSH SERPL DL<=0.005 MIU/L-ACNC: 0.74 MU/L (ref 0.4–4)

## 2021-09-23 NOTE — PROGRESS NOTES
[facility] ([facility])  September 24, 2021      Behavioral Health Clinician Progress Note    Patient Name: Pooja High      Telemedicine Visit: The patient's condition can be safely assessed and treated via synchronous audio and visual telemedicine encounter.      Reason for Telemedicine Visit: Services only offered telehealth    Originating Site (Patient Location): Patient's home    Distant Site (Provider Location): Provider Remote Setting    Consent:  The patient/guardian has verbally consented to: the potential risks and benefits of telemedicine (video visit) versus in person care; bill my insurance or make self-payment for services provided; and responsibility for payment of non-covered services.     Mode of Communication:  Video Conference via DoPay    As the provider I attest to compliance with applicable laws and regulations related to telemedicine.         Service Type:  Individual      Session Start Time: 10:30am  Session End Time: 10:50am      Session Length: 16 - 37      Attendees: Patient    Visit Activities (Refresh list every visit): [facility] Only    Diagnostic Assessment Date: 04/12/2021  See Flowsheets for today's PHQ-9 and ALICE-7 results  Previous PHQ-9:   PHQ-9 SCORE 7/20/2021 7/20/2021 7/28/2021   PHQ-9 Total Score MyChart - 10 (Moderate depression) 11 (Moderate depression)   PHQ-9 Total Score 10 10 11       Previous ALICE-7:   ALICE-7 SCORE 7/20/2021 7/20/2021 7/20/2021   Total Score - - 10 (moderate anxiety)   Total Score 10 10 10       WHODAS  WHODAS 2.0 Total Score 4/12/2021 4/14/2021   Total Score 26 26   Total Score MyChart 26 26        AUDIT  AUDIT - Alcohol Use Disorders Identification Test - Reproduced from the World Health Organization Audit 2001 (Second Edition) 4/12/2021   1.  How often do you have a drink containing alcohol? 2 to 3 times a week   2.  How many drinks containing alcohol do you have on a typical day when you are drinking? 1 or 2   3.  How often do  "you have five or more drinks on one occasion? Never   4.  How often during the last year have you found that you were not able to stop drinking once you had started? Never   5.  How often during the last year have you failed to do what was normally expected of you because of drinking? Never   6.  How often during the last year have you needed a first drink in the morning to get yourself going after a heavy drinking session? Never   7.  How often during the last year have you had a feeling of guilt or remorse after drinking? Never   8.  How often during the last year have you been unable to remember what happened the night before because of your drinking? Never   9.  Have you or someone else been injured because of your drinking? No   10. Has a relative, friend, doctor or other health care worker been concerned about your drinking or suggested you cut down? No   TOTAL SCORE 3       DATA  Extended Session (60+ minutes): No  Interactive Complexity: No  Crisis: No    Medication Compliance:  Yes      Chemical Use Review:   Substance Use: Problem use continues with no change since last session, Stage of Change: Pre-contemplation        Tobacco Use: No current tobacco use.      Current Stressors / Issues:  Reported that she is doing very well. She is beginning services at [facility] in [place]. She has a new individual therapist and will begin DBT groups soon. She also spoke about transferring her psychiatric care to them as well. She does not have an appointment yet but will soon. She would like to bridge care until her first appointment, and she would like one more appointment with Dr. Frederick \"to thank her and have some closure.\" She is also wondering how long she should stay on Citalopram 5 mg before stopping it completely.       Progress on Treatment Objective(s) / Homework:  Satisfactory progress - ACTION (Actively working towards change); Intervened by reinforcing change plan / affirming steps " taken    Motivational Interviewing    MI Intervention: Expressed Empathy/Understanding, Supported Autonomy, Collaboration, Evocation, Open-ended questions and Reflections: simple and complex     Change Talk Expressed by the Patient: Activation Taking steps    Provider Response to Change Talk: E - Evoked more info from patient about behavior change, A - Affirmed patient's thoughts, decisions, or attempts at behavior change, R - Reflected patient's change talk and S - Summarized patient's change talk statements    Also provided psychoeducation about behavioral health condition, symptoms, and treatment options      Review of Symptoms per patient report:  Depression:     Change in sleep, Lack of interest, Change in energy level, Difficulties concentrating, Irritability, Feeling sad, down, or depressed, Withdrawn, Frequent crying, Anger outbursts and Self-injurious behavior  Radha:             No Symptoms  Psychosis:       No Symptoms  Anxiety:           Excessive worry, Nervousness, Physical complaints, such as headaches, stomachaches, muscle tension, Social anxiety, Sleep disturbance, Ruminations, Poor concentration, Irritability and Anger outbursts  Panic:              Palpitations, Tremors, Shortness of breath, Tingling, Numbness and Sense of impending doom. Was having them weekly; last one was a year ago.  Post Traumatic Stress Disorder:  No Symptoms   Eating Disorder:          No Symptoms  ADD / ADHD:              No symptoms  Conduct Disorder:       No symptoms  Autism Spectrum Disorder:     No symptoms  Obsessive Compulsive Disorder:       No Symptoms      Changes in Health Issues:   None reported    Assessment: Current Emotional / Mental Status (status of significant symptoms):  Risk status (Self / Other harm or suicidal ideation)  Patient has had a history of suicidal ideation:   and self-injurious behavior:    Patient denies current fears or concerns for personal safety.  Patient denies current or recent  suicidal ideation or behaviors.  Patient denies current or recent homicidal ideation or behaviors.  Patient reports current or recent self injurious behavior or ideation including  .  Patient denies other safety concerns.  A safety and risk management plan has been developed including: Patient consented to co-developed safety plan.  A safety and risk management plan was completed.  Patient agreed to use safety plan should any safety concerns arise.  A copy was given to the patient.    Appearance:   Appropriate   Eye Contact:   Good   Psychomotor Behavior: Normal   Attitude:   Cooperative   Orientation:   All  Speech   Rate / Production: Normal    Volume:  Normal   Mood:    Normal  Affect:    Appropriate   Thought Content:  Clear   Thought Form:  Coherent  Logical   Insight:    Good     Diagnoses:  1. Moderate episode of recurrent major depressive disorder (H)    2. Attention deficit hyperactivity disorder (ADHD) evaluation    3. Posttraumatic stress disorder    4. Anxiety    5. Panic attack        Collateral Reports Completed:  Communicated with: Dr. Frederick    Plan: (Homework, other):  Patient was given information about behavioral services and encouraged to schedule a follow up appointment with the clinic [facility]  She was also given information about mental health symptoms and treatment options .  CD Recommendations: No indications of CD issues.      John Corona PsyD, LP      Gagandeep Corona PsyD  September 24, 2021

## 2021-09-24 ENCOUNTER — VIRTUAL VISIT (OUTPATIENT)
Dept: PSYCHOLOGY | Facility: CLINIC | Age: 26
End: 2021-09-24
Payer: COMMERCIAL

## 2021-09-24 DIAGNOSIS — F33.1 MODERATE EPISODE OF RECURRENT MAJOR DEPRESSIVE DISORDER (H): Primary | ICD-10-CM

## 2021-09-24 DIAGNOSIS — F41.9 ANXIETY: ICD-10-CM

## 2021-09-24 DIAGNOSIS — Z13.39 ATTENTION DEFICIT HYPERACTIVITY DISORDER (ADHD) EVALUATION: ICD-10-CM

## 2021-09-24 DIAGNOSIS — F41.0 PANIC ATTACK: ICD-10-CM

## 2021-09-24 DIAGNOSIS — F43.10 POSTTRAUMATIC STRESS DISORDER: ICD-10-CM

## 2021-09-24 PROCEDURE — 90832 PSYTX W PT 30 MINUTES: CPT | Mod: 95 | Performed by: PSYCHOLOGIST

## 2021-09-28 LAB
BKR LAB AP GYN ADEQUACY: NORMAL
BKR LAB AP GYN INTERPRETATION: NORMAL
BKR LAB AP HPV REFLEX: NORMAL
BKR LAB AP PREVIOUS ABNORMAL: NORMAL
PATH REPORT.COMMENTS IMP SPEC: NORMAL
PATH REPORT.RELEVANT HX SPEC: NORMAL

## 2021-10-05 ENCOUNTER — VIRTUAL VISIT (OUTPATIENT)
Dept: PSYCHOLOGY | Facility: CLINIC | Age: 26
End: 2021-10-05
Payer: COMMERCIAL

## 2021-10-05 DIAGNOSIS — F43.10 POSTTRAUMATIC STRESS DISORDER: Primary | ICD-10-CM

## 2021-10-05 DIAGNOSIS — F31.81 BIPOLAR II DISORDER (H): ICD-10-CM

## 2021-10-05 DIAGNOSIS — F90.9 ATTENTION DEFICIT HYPERACTIVITY DISORDER (ADHD), UNSPECIFIED ADHD TYPE: ICD-10-CM

## 2021-10-05 DIAGNOSIS — Z86.59 HX OF BORDERLINE PERSONALITY DISORDER: ICD-10-CM

## 2021-10-05 PROCEDURE — 96131 PSYCL TST EVAL PHYS/QHP EA: CPT | Mod: 95 | Performed by: PSYCHOLOGIST

## 2021-10-05 PROCEDURE — 96130 PSYCL TST EVAL PHYS/QHP 1ST: CPT | Mod: 95 | Performed by: PSYCHOLOGIST
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
## 2021-10-06 ENCOUNTER — FCC EXTENDED DOCUMENTATION (OUTPATIENT)
Dept: PSYCHOLOGY | Facility: CLINIC | Age: 26
End: 2021-10-06
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
## 2021-10-20 ENCOUNTER — VIRTUAL VISIT (OUTPATIENT)
Dept: PSYCHIATRY | Facility: CLINIC | Age: 26
End: 2021-10-20
Payer: COMMERCIAL

## 2021-10-20 ENCOUNTER — VIRTUAL VISIT (OUTPATIENT)
Dept: BEHAVIORAL HEALTH | Facility: CLINIC | Age: 26
End: 2021-10-20
Payer: COMMERCIAL

## 2021-10-20 DIAGNOSIS — F31.81 BIPOLAR 2 DISORDER (H): ICD-10-CM

## 2021-10-20 DIAGNOSIS — F41.1 GAD (GENERALIZED ANXIETY DISORDER): ICD-10-CM

## 2021-10-20 DIAGNOSIS — F43.10 PTSD (POST-TRAUMATIC STRESS DISORDER): Primary | ICD-10-CM

## 2021-10-20 DIAGNOSIS — G47.00 INSOMNIA, UNSPECIFIED TYPE: ICD-10-CM

## 2021-10-20 DIAGNOSIS — F43.10 PTSD (POST-TRAUMATIC STRESS DISORDER): ICD-10-CM

## 2021-10-20 DIAGNOSIS — F31.81 BIPOLAR II DISORDER (H): Primary | ICD-10-CM

## 2021-10-20 PROCEDURE — 99214 OFFICE O/P EST MOD 30 MIN: CPT | Mod: 95 | Performed by: PSYCHIATRY & NEUROLOGY

## 2021-10-20 PROCEDURE — 90832 PSYTX W PT 30 MINUTES: CPT | Mod: 95 | Performed by: MARRIAGE & FAMILY THERAPIST

## 2021-10-20 RX ORDER — LAMOTRIGINE 100 MG/1
100 TABLET ORAL DAILY
Qty: 30 TABLET | Refills: 0 | Status: SHIPPED | OUTPATIENT
Start: 2021-11-17 | End: 2021-12-12

## 2021-10-20 RX ORDER — LAMOTRIGINE 25 MG/1
TABLET ORAL
Qty: 42 TABLET | Refills: 0 | Status: SHIPPED | OUTPATIENT
Start: 2021-10-20 | End: 2022-01-14 | Stop reason: DRUGHIGH

## 2021-10-20 ASSESSMENT — ANXIETY QUESTIONNAIRES
7. FEELING AFRAID AS IF SOMETHING AWFUL MIGHT HAPPEN: SEVERAL DAYS
1. FEELING NERVOUS, ANXIOUS, OR ON EDGE: NEARLY EVERY DAY
4. TROUBLE RELAXING: SEVERAL DAYS
6. BECOMING EASILY ANNOYED OR IRRITABLE: SEVERAL DAYS
2. NOT BEING ABLE TO STOP OR CONTROL WORRYING: NEARLY EVERY DAY
GAD7 TOTAL SCORE: 14
5. BEING SO RESTLESS THAT IT IS HARD TO SIT STILL: MORE THAN HALF THE DAYS
GAD7 TOTAL SCORE: 14
7. FEELING AFRAID AS IF SOMETHING AWFUL MIGHT HAPPEN: SEVERAL DAYS
3. WORRYING TOO MUCH ABOUT DIFFERENT THINGS: NEARLY EVERY DAY
GAD7 TOTAL SCORE: 14
8. IF YOU CHECKED OFF ANY PROBLEMS, HOW DIFFICULT HAVE THESE MADE IT FOR YOU TO DO YOUR WORK, TAKE CARE OF THINGS AT HOME, OR GET ALONG WITH OTHER PEOPLE?: SOMEWHAT DIFFICULT

## 2021-10-20 ASSESSMENT — PATIENT HEALTH QUESTIONNAIRE - PHQ9
SUM OF ALL RESPONSES TO PHQ QUESTIONS 1-9: 14
SUM OF ALL RESPONSES TO PHQ QUESTIONS 1-9: 14
10. IF YOU CHECKED OFF ANY PROBLEMS, HOW DIFFICULT HAVE THESE PROBLEMS MADE IT FOR YOU TO DO YOUR WORK, TAKE CARE OF THINGS AT HOME, OR GET ALONG WITH OTHER PEOPLE: SOMEWHAT DIFFICULT

## 2021-10-20 NOTE — PROGRESS NOTES
"Pooja High is a 26 year old who is being evaluated via a billable telephone visit.      What phone number would you like to be contacted at? See Epic     How would you like to obtain your AVS? MyChart    Visit switched to telephone due to technical difficulties with video.           Outpatient Psychiatric Progress Note    Name: Pooja High   : 1995                    Primary Care Provider: [facility]   Therapist: Cash Mckay    PHQ-9 scores:  PHQ-9 SCORE 2021 2021 10/20/2021   PHQ-9 Total Score MyChart 10 (Moderate depression) 11 (Moderate depression) 14 (Moderate depression)   PHQ-9 Total Score 10 11 14       ALICE-7 scores:  ALICE-7 SCORE 2021 2021 10/20/2021   Total Score - 10 (moderate anxiety) 14 (moderate anxiety)   Total Score 10 10 14       Patient Identification:  Patient is a 26 year old,   White Not  or  female  who presents for return visit with me.  Patient is currently employed full time. Patient attended the phone/video session alone. Patient prefers to be called: \"Pooja\".    Interim History:  I last saw Pooja High for outpatient psychiatry return visit on 21. During that appointment, we:      Continue buspirone 15 mg twice daily for anxiety.    Continue hydroxyzine 25-50 mg at bedtime as needed for sleep    Decrease citalopram/Celexa to 10 mg daily for mood    Continue Wellbutrin SR to 100 mg twice daily for mood    Continue to proceed with ADHD psychological testing    10/20: Pt reports she is doing well. 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 She is very happy where she is at now. Feels validated and more respected at new workplace. Recent psychological testing revealed PTSD and Bipolar II Disorder. Feels like the diagnoses fit.  She is interested in medication to treat bipolar disorder. No safety concerns.  No SI.  No problematic drug or alcohol use.    Per [facility] " NEISHA Khan, during today's team-based visit:  Pt has started DBT and is loving it. Likes her therapist. She started a new job this week. Has Bipolar, PTSD from assessments. They want on her Bipolar medication and medication for PTSD. See note from Dr. Dykes on 10/6/21 for diagnosis  Reviewed safety with Patient. Patient denies any current SI,plans or intentions. Denied self-injurious behavior.      Psychiatric ROS:  Pooja High reports mood has been: up and down  Anxiety has been: up and down, some recent stressors  Sleep has been: Up and down  Radha sxs: None  Psychosis sxs: None  ADHD/ADD sxs: NA  PTSD sxs: None  PHQ9 and GAD7 scores were reviewed today if completed.   Medication side effects: Denies  Current stressors include: Symptoms and See HPI above  Coping mechanisms and supports include: Therapy, Family, Hobbies and Friends    Current medications include:   Current Outpatient Medications   Medication Sig     buPROPion (WELLBUTRIN SR) 100 MG 12 hr tablet Take 1 tablet (100 mg) by mouth 2 times daily     busPIRone (BUSPAR) 15 MG tablet Take 1 tablet (15 mg) by mouth 2 times daily     Cholecalciferol (VITAMIN D-3 PO)      citalopram (CELEXA) 10 MG tablet Take 1 tablet (10 mg) by mouth daily (Patient taking differently: Take 5 mg by mouth daily )     Fexofenadine HCl (ALLERGY 24-HR PO)      hydrOXYzine (VISTARIL) 25 MG capsule Take 1-2 capsules (25-50 mg) by mouth nightly as needed (sleep)     levonorgestrel (MIRENA) 20 MCG/24HR IUD 1 Device by Intrauterine route     Meclizine HCl (ANTIVERT PO)      multivitamin w/minerals (MULTI-VITAMIN) tablet Take 1 tablet by mouth daily     No current facility-administered medications for this visit.       Past Medical/Surgical History:  Past Medical History:   Diagnosis Date     Anxiety 10/17/2019     Depressive disorder      ALICE (generalized anxiety disorder) 9/22/2020     Moderate episode of recurrent major depressive disorder (H) 9/22/2020     Panic disorder        has a past medical history of Anxiety (10/17/2019), Depressive disorder, ALICE (generalized anxiety disorder) (9/22/2020), Moderate episode of recurrent major depressive disorder (H) (9/22/2020), and Panic disorder.    Social History:  Reviewed. No changes to social history except as noted above in HPI.    Vital Signs:   None. This is phone/video visit.     Labs:  Most recent laboratory results reviewed and no new labs.     Review of Systems:  10 systems (general, cardiovascular, respiratory, eyes, ENT, endocrine, GI, , M/S, neurological) were reviewed. Most pertinent finding(s) is/are: denies fever, cough, headaches, shortness of breath, chest pain, N/V, constipation/diarrhea, trouble urinating, aches and pains. The remaining systems are all unremarkable.    Mental Status Examination (limited as this is by phone/video):  Attitude:  cooperative, pleasant  Oriented to:  person, place, time, and situation  Attention Span and Concentration:  normal  Speech:  clear, coherent, regular rate, rhythm, and volume  Language: intact  Mood:  Up and down  Affect:  appropriate and in normal range, mood congruent and Bright  Associations:  no loose associations  Thought Process:  logical, linear and goal oriented  Thought Content:  no evidence of suicidal ideation or homicidal ideation, no evidence of psychotic thought, no auditory hallucinations present and no visual hallucinations present  Recent and Remote Memory:  Intact to interview. Not formally assessed. No amnesia.  Fund of Knowledge: appropriate  Insight:  good  Judgment:  intact, adequate for safety  Impulse Control:  intact    Suicide Risk Assessment:  Today Pooja High reports no suicidal ideation. Based on all available evidence including the factors cited above, Pooja High does not appear to be at imminent risk for self-harm, does not meet criteria for a 72-hr hold, and therefore remains appropriate for ongoing outpatient level of care.  A thorough assessment of  risk factors related to suicide and self-harm have been reviewed and are noted above. The patient convincingly denies suicidality on several occasions. Local community safety resources printed and reviewed for patient to use if needed. There was no deceit detected, and the patient presented in a manner that was believable.     DSM5 Diagnosis:  300.02 (F41.1) Generalized Anxiety Disorder   Bipolar II Disorder  PTSD  Insomnia, unspecified    Medical comorbidities include:   Patient Active Problem List    Diagnosis Date Noted     Bipolar 2 disorder (H) 10/20/2021     Priority: Medium     PTSD (post-traumatic stress disorder) 10/20/2021     Priority: Medium     Moderate episode of recurrent major depressive disorder (H) 09/22/2020     Priority: Medium     ALICE (generalized anxiety disorder) 09/22/2020     Priority: Medium     Panic disorder without agoraphobia 09/22/2020     Priority: Medium     Anxiety 10/17/2019     Priority: Medium     ACP (advance care planning) 09/22/2020     Priority: Low     Health Care Home 09/22/2020     Priority: Low       Psychosocial & Contextual Factors: see HPI above    Assessment:  5/11/21  Pooja High reports overall some good improvement in her symptoms on Wellbutrin SR and with continued individual therapy.  She takes her Wellbutrin dose pretty early in the morning and so I suggest adding the afternoon dose to prevent the crash she is experiencing.  Increased fatigue and sedation midday likely due to the first Wellbutrin SR dose wearing off.  I also recommend decreasing her citalopram since we will continue to optimize Wellbutrin SR.  She is agreeable to this plan.  I am hoping she might start sleeping a little better after second dose of Wellbutrin wears off.  She will continue to monitor.  She is encouraged to continue with ADHD testing in July.  She may also be starting with a DBT specialist.  She continues to also be open to the idea of ACT therapy. Has Mirena IUD to prevent  pregnancy.      7/16/2021:   Patient overall doing quite well.  Found second dose of Wellbutrin SR to be quite helpful.  No decompensation with decreased Celexa dose and so we will continue to taper.  Can always further optimize Wellbutrin SR.  Also has ADHD testing coming up soon.  If affirmative for ADHD, might end up replacing Wellbutrin SR with stimulant medication.  Ran out of BuSpar and did notice slight dip/worsening of symptoms.  Refills sent today for buspirone.  She is encouraged to continue therapy and will continue to look for a good fit.    10/20/2021:  Patient with recent psychological testing.  Diagnosed with PTSD and bipolar 2 disorder.  Seems like it fits per the patient.  Could also see how these are accurate diagnoses.  Discussed treatment with lamotrigine which could be a good mood stabilizer in her case.  She seems to suffer more from depressive episodes and hypomanic/manic episodes.  Discussed risks and benefits of treatment.  Encouraged to continue to engage in individual therapy, particularly for the trauma component.    Medication side effects and alternatives were reviewed. Health promotion activities recommended and reviewed today. All questions addressed. Education and counseling completed regarding risks and benefits of medications and psychotherapy options. Recommend therapy for additional support.     Treatment Plan:    Continue buspirone 15 mg twice daily for anxiety.    Continue hydroxyzine 25-50 mg at bedtime as needed for sleep    Discontinue citalopram/Celexa    Start Lamotrigine: take 25 mg daily for two weeks then increase to 50 mg daily for two weeks then increase to 100 mg until follow-up appointment in about six weeks.      Continue Wellbutrin SR to 100 mg twice daily for mood    Continue all other cares per primary care provider.     Continue all other medications as reviewed per electronic medical record today.     Safety plan reviewed. To the Emergency Department as needed  or call after hours crisis line at [phone] or [phone]. [place] Crisis Text Line. Text [place] to 718063 or Suicide LifeLine Chat: suicidepreventionlifeline.org/chat    Continue therapy as planned.     Schedule an appointment with me in 4-6 months or sooner as needed. Call [facility] at [phone] to schedule.    Follow up with primary care provider as planned or for acute medical concerns.    Call the psychiatric nurse line with medication questions or concerns at [phone].    [employer] may be used to communicate with your provider, but this is not intended to be used for emergencies.    Discussed Lamictal (lamotrigine) can cause serious rashes including Mike-Riaz syndrome which may requiring hospitalization and discontinuation of treatment. If any signs of a rash occur, please see your Primary Care Provider or a dermatologist immediately.     Administrative Billing:   Phone Call/Video Duration: 19 Minutes  Start: 1:00p  Stop: 1:19p    Time spent with patient was 19 minutes and greater than 50% of time or 10 minutes was spent in counseling and coordination of care regarding above diagnoses and treatment plan. Patient with multiple psychiatric diagnoses and medication changes adding to complexity of care.      Patient Status:  Patient will continue to be seen for ongoing consultation and stabilization.    Signed:   Magaly Frederick DO  [facility]    Disclaimer: This note consists of symbols derived from keyboarding, dictation and/or voice recognition software. As a result, there may be errors in the script that have gone undetected. Please consider this when interpreting information found in this chart.

## 2021-10-20 NOTE — Clinical Note
Please call this patient to get them scheduled for a follow-up visit in 6 weeks. Please schedule with me and the [facility]. Thanks!
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
## 2021-10-21 ASSESSMENT — ANXIETY QUESTIONNAIRES: GAD7 TOTAL SCORE: 14

## 2021-10-21 ASSESSMENT — PATIENT HEALTH QUESTIONNAIRE - PHQ9: SUM OF ALL RESPONSES TO PHQ QUESTIONS 1-9: 14

## 2021-11-05 NOTE — PATIENT INSTRUCTIONS
Treatment Plan:    Continue buspirone 15 mg twice daily for anxiety.    Continue hydroxyzine 25-50 mg at bedtime as needed for sleep    Discontinue citalopram/Celexa    Start Lamotrigine: take 25 mg daily for two weeks then increase to 50 mg daily for two weeks then increase to 100 mg until follow-up appointment in about six weeks.      Continue Wellbutrin SR to 100 mg twice daily for mood    Continue all other cares per primary care provider.     Continue all other medications as reviewed per electronic medical record today.     Safety plan reviewed. To the Emergency Department as needed or call after hours crisis line at [phone] or [phone]. [place] Crisis Text Line. Text [place] to 642304 or Suicide LifeLine Chat: suicidepreventionJumpInline.org/chat    Continue therapy as planned.     Schedule an appointment with me in 4-6 months or sooner as needed. Call [facility] at [phone] to schedule.    Follow up with primary care provider as planned or for acute medical concerns.    Call the psychiatric nurse line with medication questions or concerns at [phone].    Videoflowt may be used to communicate with your provider, but this is not intended to be used for emergencies.    Discussed Lamictal (lamotrigine) can cause serious rashes including Mike-Riaz syndrome which may requiring hospitalization and discontinuation of treatment. If any signs of a rash occur, please see your Primary Care Provider or a dermatologist immediately.

## 2021-11-11 DIAGNOSIS — F41.0 PANIC ATTACK: ICD-10-CM

## 2021-11-11 DIAGNOSIS — F41.1 GAD (GENERALIZED ANXIETY DISORDER): ICD-10-CM

## 2021-11-11 NOTE — TELEPHONE ENCOUNTER
Date of Last Office Visit: 10/20/2021  Date of Next Office Visit: none-not necessary to schedule yet (4-6mo from October)  No shows since last visit: 0  Cancellations since last visit: 0    Medication requested: busPIRone (BUSPAR) 15 MG  Date last ordered: 07/16/2021 Qty: 180 Refills: 0    Lapse in medication adherence greater than 5 days?: no, pharmacy indicates last fill 09/28/2021  If yes, call patient and gather details: n/a  Medication refill request verified as identical to current order?: yes  Result of Last DAM, VPA, Li+ Level, CBC, or Carbamazepine Level (at or since last visit): N/A    Last visit treatment plan:     Continue buspirone 15 mg twice daily for anxiety.    Continue hydroxyzine 25-50 mg at bedtime as needed for sleep    Discontinue citalopram/Celexa    Start Lamotrigine: take 25 mg daily for two weeks then increase to 50 mg daily for two weeks then increase to 100 mg until follow-up appointment in about six weeks.      Continue Wellbutrin SR to 100 mg twice daily for mood    Continue all other cares per primary care provider.     Continue all other medications as reviewed per electronic medical record today.     Safety plan reviewed. To the Emergency Department as needed or call after hours crisis line at [phone] or [phone]. [place] Crisis Text Line. Text [place] to 808345 or Suicide LifeLine Chat: suicidepreventionlifeline.org/chat    Continue therapy as planned.     Schedule an appointment with me in 4-6 months or sooner as needed.       []Medication refilled per  Medication Refill in Ambulatory Care  policy.  [x]Medication unable to be refilled by RN due to criteria not met as indicated below:    []Eligibility - not seen in the last year   []Supervision - no future appointment   []Compliance - no shows, cancellations or lapse in therapy   []Verification - order discrepancy   []Controlled medication   []Medication not included in policy   [x]90-day supply request   []Other

## 2021-11-12 RX ORDER — BUSPIRONE HYDROCHLORIDE 15 MG/1
15 TABLET ORAL 2 TIMES DAILY
Qty: 180 TABLET | Refills: 0 | Status: SHIPPED | OUTPATIENT
Start: 2021-11-12 | End: 2022-01-14

## 2021-12-12 ENCOUNTER — MYC REFILL (OUTPATIENT)
Dept: PSYCHIATRY | Facility: CLINIC | Age: 26
End: 2021-12-12
Payer: COMMERCIAL

## 2021-12-12 DIAGNOSIS — F31.81 BIPOLAR II DISORDER (H): ICD-10-CM

## 2021-12-12 DIAGNOSIS — F41.0 PANIC ATTACK: ICD-10-CM

## 2021-12-12 DIAGNOSIS — F41.1 GAD (GENERALIZED ANXIETY DISORDER): ICD-10-CM

## 2021-12-12 RX ORDER — BUSPIRONE HYDROCHLORIDE 15 MG/1
15 TABLET ORAL 2 TIMES DAILY
Qty: 180 TABLET | Refills: 0 | Status: CANCELLED | OUTPATIENT
Start: 2021-12-12

## 2021-12-13 DIAGNOSIS — F41.0 PANIC ATTACK: ICD-10-CM

## 2021-12-13 DIAGNOSIS — F41.1 GAD (GENERALIZED ANXIETY DISORDER): ICD-10-CM

## 2021-12-13 RX ORDER — LAMOTRIGINE 100 MG/1
100 TABLET ORAL DAILY
Qty: 30 TABLET | Refills: 0 | Status: SHIPPED | OUTPATIENT
Start: 2021-12-13 | End: 2022-01-14

## 2021-12-13 RX ORDER — BUSPIRONE HYDROCHLORIDE 15 MG/1
15 TABLET ORAL 2 TIMES DAILY
Qty: 180 TABLET | Refills: 0 | OUTPATIENT
Start: 2021-12-13

## 2021-12-13 NOTE — TELEPHONE ENCOUNTER
Dr Frederick, fowarding the lamictal request but will deny the buspar request as too soon to fill as they should have enough to last them mid Feb 2022.      Medication requested: busPIRone (BUSPAR) 15 MG tablet. 1 tab BID Date last ordered: 11/12/21 Qty: 180  Refills: 0

## 2021-12-13 NOTE — TELEPHONE ENCOUNTER
Dr Frederick, forwarding the Lamictal request but will deny the Buspar request as too soon to fill as they should have enough to last them mid Feb 2022.    Date of Last Office Visit: 10/20/21  Date of Next Office Visit: NONE  No shows since last visit: NO  Cancellations since last visit: NO    Medication requested: lamoTRIgine (LAMICTAL) 100 MG tablet Date last ordered: 11/17/21 Qty: 30 Refills: 0    Medication requested: busPIRone (BUSPAR) 15 MG tablet Date last ordered: 11/12/21 Qty: 180  Refills: 0     Review of [place] ?: n/a    Lapse in medication adherence greater than 5 days?: no  Medication refill request verified as identical to current order?: yes  Result of Last DAM, VPA, Li+ Level, CBC, or Carbamazepine Level (at or since last visit): N/A    Last visit treatment plan:   Treatment Plan:    Continue buspirone 15 mg twice daily for anxiety.    Continue hydroxyzine 25-50 mg at bedtime as needed for sleep    Discontinue citalopram/Celexa    Start Lamotrigine: take 25 mg daily for two weeks then increase to 50 mg daily for two weeks then increase to 100 mg until follow-up appointment in about six weeks.      Continue Wellbutrin SR to 100 mg twice daily for mood    Continue all other cares per primary care provider.     Continue all other medications as reviewed per electronic medical record today.       []Medication refilled per  Medication Refill in Ambulatory Care  policy.  []Medication unable to be refilled by RN due to criteria not met as indicated below:    []Eligibility - not seen in the last year   []Supervision - no future appointment   []Compliance - no shows, cancellations or lapse in therapy   []Verification - order discrepancy   []Controlled medication   [x]Medication not included in policy   []90-day supply request   []Other

## 2021-12-31 DIAGNOSIS — F41.1 GAD (GENERALIZED ANXIETY DISORDER): ICD-10-CM

## 2021-12-31 RX ORDER — HYDROXYZINE PAMOATE 25 MG/1
25-50 CAPSULE ORAL
Qty: 60 CAPSULE | Refills: 1 | Status: SHIPPED | OUTPATIENT
Start: 2021-12-31 | End: 2022-01-14

## 2021-12-31 NOTE — TELEPHONE ENCOUNTER
Date of Last Office Visit: 10/20/21  Date of Next Office Visit: NONE  No shows since last visit: 0  Cancellations since last visit: 0    Medication requested: hydrOXYzine (VISTARIL) 25 MG capsule Date last ordered: 05/11/21 Qty: 60 Refills: 1     Review of [place] ?: NA      Lapse in medication adherence greater than 5 days?: NO - PRN medication  Medication refill request verified as identical to current order?: Yes  Result of Last DAM, VPA, Li+ Level, CBC, or Carbamazepine Level (at or since last visit): N/A      Last visit treatment plan: Continue buspirone 15 mg twice daily for anxiety.    Continue hydroxyzine 25-50 mg at bedtime as needed for sleep    Discontinue citalopram/Celexa    Start Lamotrigine: take 25 mg daily for two weeks then increase to 50 mg daily for two weeks then increase to 100 mg until follow-up appointment in about six weeks.      Continue Wellbutrin SR to 100 mg twice daily for mood    Continue all other cares per primary care provider.     Continue all other medications as reviewed per electronic medical record today.     Safety plan reviewed. To the Emergency Department as needed or call after hours crisis line at [phone] or [phone]. [place] Crisis Text Line. Text [place] to 368709 or Suicide LifeLine Chat: suicidepreventionlifeline.org/chat    Continue therapy as planned.     Schedule an appointment with me in 4-6 months or sooner as needed. Call [facility] at [phone] to schedule.      []Medication refilled per  Medication Refill in Ambulatory Care  policy.  [x]Medication unable to be refilled by RN due to criteria not met as indicated below:    []Eligibility - not seen in the last year   []Supervision - no future appointment   []Compliance - no shows, cancellations or lapse in therapy   []Verification - order discrepancy   []Controlled medication   [x]Medication not included in policy   []90-day supply request   []Other

## 2022-01-14 ENCOUNTER — VIRTUAL VISIT (OUTPATIENT)
Dept: PSYCHOLOGY | Facility: CLINIC | Age: 27
End: 2022-01-14
Payer: COMMERCIAL

## 2022-01-14 ENCOUNTER — VIRTUAL VISIT (OUTPATIENT)
Dept: PSYCHIATRY | Facility: CLINIC | Age: 27
End: 2022-01-14
Payer: COMMERCIAL

## 2022-01-14 DIAGNOSIS — F41.1 GAD (GENERALIZED ANXIETY DISORDER): ICD-10-CM

## 2022-01-14 DIAGNOSIS — F43.10 POSTTRAUMATIC STRESS DISORDER: ICD-10-CM

## 2022-01-14 DIAGNOSIS — F31.81 BIPOLAR II DISORDER (H): Primary | ICD-10-CM

## 2022-01-14 DIAGNOSIS — F41.0 PANIC ATTACK: ICD-10-CM

## 2022-01-14 PROCEDURE — 90832 PSYTX W PT 30 MINUTES: CPT | Mod: 95 | Performed by: PSYCHOLOGIST

## 2022-01-14 PROCEDURE — 99214 OFFICE O/P EST MOD 30 MIN: CPT | Mod: 95 | Performed by: PSYCHIATRY & NEUROLOGY

## 2022-01-14 RX ORDER — HYDROXYZINE PAMOATE 25 MG/1
25-50 CAPSULE ORAL
Qty: 60 CAPSULE | Refills: 1 | Status: SHIPPED | OUTPATIENT
Start: 2022-01-14 | End: 2022-07-15

## 2022-01-14 RX ORDER — BUPROPION HYDROCHLORIDE 100 MG/1
100 TABLET, EXTENDED RELEASE ORAL 2 TIMES DAILY
Qty: 180 TABLET | Refills: 0 | Status: SHIPPED | OUTPATIENT
Start: 2022-01-14 | End: 2022-05-06

## 2022-01-14 RX ORDER — BUSPIRONE HYDROCHLORIDE 10 MG/1
20 TABLET ORAL 2 TIMES DAILY
Qty: 360 TABLET | Refills: 0 | Status: SHIPPED | OUTPATIENT
Start: 2022-01-14 | End: 2022-04-13

## 2022-01-14 RX ORDER — LAMOTRIGINE 25 MG/1
75 TABLET ORAL DAILY
Qty: 270 TABLET | Refills: 0 | Status: SHIPPED | OUTPATIENT
Start: 2022-01-14 | End: 2022-04-23

## 2022-01-14 ASSESSMENT — ANXIETY QUESTIONNAIRES
4. TROUBLE RELAXING: MORE THAN HALF THE DAYS
6. BECOMING EASILY ANNOYED OR IRRITABLE: MORE THAN HALF THE DAYS
3. WORRYING TOO MUCH ABOUT DIFFERENT THINGS: MORE THAN HALF THE DAYS
5. BEING SO RESTLESS THAT IT IS HARD TO SIT STILL: SEVERAL DAYS
GAD7 TOTAL SCORE: 14
2. NOT BEING ABLE TO STOP OR CONTROL WORRYING: NEARLY EVERY DAY
7. FEELING AFRAID AS IF SOMETHING AWFUL MIGHT HAPPEN: SEVERAL DAYS
7. FEELING AFRAID AS IF SOMETHING AWFUL MIGHT HAPPEN: SEVERAL DAYS
1. FEELING NERVOUS, ANXIOUS, OR ON EDGE: NEARLY EVERY DAY
GAD7 TOTAL SCORE: 14
GAD7 TOTAL SCORE: 14

## 2022-01-14 ASSESSMENT — PATIENT HEALTH QUESTIONNAIRE - PHQ9
10. IF YOU CHECKED OFF ANY PROBLEMS, HOW DIFFICULT HAVE THESE PROBLEMS MADE IT FOR YOU TO DO YOUR WORK, TAKE CARE OF THINGS AT HOME, OR GET ALONG WITH OTHER PEOPLE: SOMEWHAT DIFFICULT
SUM OF ALL RESPONSES TO PHQ QUESTIONS 1-9: 14
SUM OF ALL RESPONSES TO PHQ QUESTIONS 1-9: 14

## 2022-01-14 NOTE — Clinical Note
Please call this patient to get them scheduled for a follow-up visit in 2 months. Please schedule with me and the [facility] [place], Dr. Corona. Thanks!

## 2022-01-14 NOTE — PROGRESS NOTES
Collaborative Care Psychiatry Service (CCPS)  January 14, 2022      Behavioral Health Clinician Progress Note    Patient Name: Pooja High      Telemedicine Visit: The patient's condition can be safely assessed and treated via synchronous audio and visual telemedicine encounter.      Reason for Telemedicine Visit: Services only offered telehealth    Originating Site (Patient Location): Patient's home    Distant Site (Provider Location): Provider Remote Setting    Consent:  The patient/guardian has verbally consented to: the potential risks and benefits of telemedicine (video visit) versus in person care; bill my insurance or make self-payment for services provided; and responsibility for payment of non-covered services.     Mode of Communication:  Video Conference via [employer]    As the provider I attest to compliance with applicable laws and regulations related to telemedicine.         Service Type:  Individual      Session Start Time: 01:00pm  Session End Time: 01:20pm      Session Length: 16 - 37      Attendees: Patient    Visit Activities (Refresh list every visit): [facility] Only    Diagnostic Assessment Date: 04/12/2021  See Flowsheets for today's PHQ-9 and ALICE-7 results  Previous PHQ-9:   PHQ-9 SCORE 7/28/2021 10/20/2021 1/14/2022   PHQ-9 Total Score [facility] 11 (Moderate depression) 14 (Moderate depression) 14 (Moderate depression)   PHQ-9 Total Score 11 14 14       Previous ALICE-7:   ALICE-7 SCORE 7/20/2021 10/20/2021 1/14/2022   Total Score 10 (moderate anxiety) 14 (moderate anxiety) 14 (moderate anxiety)   Total Score 10 14 14       WHODAS  WHODAS 2.0 Total Score 4/12/2021 4/14/2021   Total Score 26 26   Total Score MyChart 26 26        AUDIT  AUDIT - Alcohol Use Disorders Identification Test - Reproduced from the World Health Organization Audit 2001 (Second Edition) 4/12/2021   1.  How often do you have a drink containing alcohol? 2 to 3 times a week   2.  How many drinks containing alcohol do you have on a  typical day when you are drinking? 1 or 2   3.  How often do you have five or more drinks on one occasion? Never   4.  How often during the last year have you found that you were not able to stop drinking once you had started? Never   5.  How often during the last year have you failed to do what was normally expected of you because of drinking? Never   6.  How often during the last year have you needed a first drink in the morning to get yourself going after a heavy drinking session? Never   7.  How often during the last year have you had a feeling of guilt or remorse after drinking? Never   8.  How often during the last year have you been unable to remember what happened the night before because of your drinking? Never   9.  Have you or someone else been injured because of your drinking? No   10. Has a relative, friend, doctor or other health care worker been concerned about your drinking or suggested you cut down? No   TOTAL SCORE 3       DATA  Extended Session (60+ minutes): No  Interactive Complexity: No  Crisis: No    Medication Compliance:  Yes      Chemical Use Review:   Substance Use: Problem use continues with no change since last session, Stage of Change: Pre-contemplation        Tobacco Use: No current tobacco use.      Current Stressors / Issues:  Reported that she is doing well. She got a new job as an  at a  center. She continues to do DBT group/individual. She really enjoys the program and appreciates. Lamotrigine has been really helpful for her depressed mood. She has been out of hydroxyzine and has not been sleeping well because of the anxiety keeping her up at night. She gave up caffeine but that did not make a change. She reported that she has lost her appetite and has also had irregular bowel movements. She has also been happening more frequent headaches. She is unsure if these issues are related to lamotrigine or stress.       Progress on Treatment Objective(s) /  Homework:  Satisfactory progress - ACTION (Actively working towards change); Intervened by reinforcing change plan / affirming steps taken    Motivational Interviewing    MI Intervention: Expressed Empathy/Understanding, Supported Autonomy, Collaboration, Evocation, Open-ended questions and Reflections: simple and complex     Change Talk Expressed by the Patient: Activation Taking steps    Provider Response to Change Talk: E - Evoked more info from patient about behavior change, A - Affirmed patient's thoughts, decisions, or attempts at behavior change, R - Reflected patient's change talk and S - Summarized patient's change talk statements    Also provided psychoeducation about behavioral health condition, symptoms, and treatment options      Review of Symptoms per patient report:  Depression:     Change in sleep, Lack of interest, Change in energy level, Difficulties concentrating, Irritability, Feeling sad, down, or depressed, Withdrawn, Frequent crying, Anger outbursts and Self-injurious behavior  Radha:             No Symptoms  Psychosis:       No Symptoms  Anxiety:           Excessive worry, Nervousness, Physical complaints, such as headaches, stomachaches, muscle tension, Social anxiety, Sleep disturbance, Ruminations, Poor concentration, Irritability and Anger outbursts  Panic:              Palpitations, Tremors, Shortness of breath, Tingling, Numbness and Sense of impending doom. Was having them weekly; last one was a year ago.  Post Traumatic Stress Disorder:  No Symptoms   Eating Disorder:          No Symptoms  ADD / ADHD:              No symptoms  Conduct Disorder:       No symptoms  Autism Spectrum Disorder:     No symptoms  Obsessive Compulsive Disorder:       No Symptoms      Changes in Health Issues:   None reported    Assessment: Current Emotional / Mental Status (status of significant symptoms):  Risk status (Self / Other harm or suicidal ideation)  Patient has had a history of suicidal ideation:    and self-injurious behavior:    Patient denies current fears or concerns for personal safety.  Patient denies current or recent suicidal ideation or behaviors.  Patient denies current or recent homicidal ideation or behaviors.  Patient reports current or recent self injurious behavior or ideation including  .  Patient denies other safety concerns.  A safety and risk management plan has been developed including: Patient consented to co-developed safety plan.  A safety and risk management plan was completed.  Patient agreed to use safety plan should any safety concerns arise.  A copy was given to the patient.    Appearance:   Appropriate   Eye Contact:   Good   Psychomotor Behavior: Normal   Attitude:   Cooperative   Orientation:   All  Speech   Rate / Production: Normal    Volume:  Normal   Mood:    Normal  Affect:    Appropriate   Thought Content:  Clear   Thought Form:  Coherent  Logical   Insight:    Good     Diagnoses:  1. Bipolar II disorder (H)    2. ALICE (generalized anxiety disorder)    3. Posttraumatic stress disorder        Collateral Reports Completed:  Communicated with: Dr. Frederick    Plan: (Homework, other):  Patient was given information about behavioral services and encouraged to schedule a follow up appointment with the clinic [facility].  She was also given information about mental health symptoms and treatment options .  CD Recommendations: No indications of CD issues.      John Corona PsyD, LP      Gagandeep Corona PsyD  January 14, 2022

## 2022-01-14 NOTE — PROGRESS NOTES
"Pooja High is a 26 year old year old who is being evaluated via a billable video visit.      How would you like to obtain your AVS? MyChart  If you are dropped from the video visit, the video invite should be resent to: Text to cell phone: see Epic  Will anyone else be joining your video visit? No       Telemedicine Visit: The patient's condition can be safely assessed and treated via synchronous audio and visual telemedicine encounter.      Reason for Telemedicine Visit: Covid-19 Pandemic    Originating Site (Patient Location): Patient's home     Distant Site (Provider Location): Provider Remote Setting    Mode of Communication:  Video Conference via [employer]    As the provider I attest to compliance with applicable laws and regulations related to telemedicine.        Outpatient Psychiatric Progress Note    Name: Pooja High   : 1995                    Primary Care Provider: [facility]   Therapist: Cash Mckay    PHQ-9 scores:  PHQ-9 SCORE 2021 10/20/2021 2022   PHQ-9 Total Score MyChart 11 (Moderate depression) 14 (Moderate depression) 14 (Moderate depression)   PHQ-9 Total Score 11 14 14       ALICE-7 scores:  ALICE-7 SCORE 2021 10/20/2021 2022   Total Score 10 (moderate anxiety) 14 (moderate anxiety) 14 (moderate anxiety)   Total Score 10 14 14       Patient Identification:  Patient is a 26 year old,   White Not  or  female  who presents for return visit with me.  Patient is currently employed full time. Patient attended the phone/video session alone. Patient prefers to be called: \"Pooja\".    Interim History:  I last saw Pooja High for outpatient psychiatry return visit on 10/20/21. During that appointment, we:      Continue buspirone 15 mg twice daily for anxiety.    Continue hydroxyzine 25-50 mg at bedtime as needed for sleep    Discontinue citalopram/Celexa    Start Lamotrigine: take 25 mg daily for two weeks then " "increase to 50 mg daily for two weeks then increase to 100 mg until follow-up appointment in about six weeks.      Continue Wellbutrin SR to 100 mg twice daily for mood    1/14:   -\"I don't feel as depressed anymore\"  -Bowel movement once every 2-3 days (started around October and lately more noticeable)  -Anxiety steady, \"I don't feel like it's changed any at all\"  -Headaches - started around September but worse since end of December  -miralax (was doing every other day but then too liquidy)   -Not sleeping as well since ran out of hydroxyzine - past couple weeks not sleeping well, Monday night didn't sleep at all  - Ran out of Wellbutrin SR a couple days ago  -No safety concerns, no SI.  -No problematic drug or alcohol use.    Per [facility] [place], Dr. John Corona, during today's team-based visit:  Reported that she is doing well. She got a new job as an  at a  center. She continues to do DBT group/individual. She really enjoys the program and appreciates. Lamotrigine has been really helpful for her depressed mood. She has been out of hydroxyzine and has not been sleeping well because of the anxiety keeping her up at night. She gave up caffeine but that did not make a change. She reported that she has lost her appetite and has also had irregular bowel movements. She has also been happening more frequent headaches. She is unsure if these issues are related to lamotrigine or stress.      Psychiatric ROS:  Pooja High reports mood has been: Improved  Anxiety has been: About the same  Sleep has been: Worse since being out of hydroxyzine  Radha sxs: None  Psychosis sxs: None  ADHD/ADD sxs: NA  PTSD sxs: None  PHQ9 and GAD7 scores were reviewed today if completed.   Medication side effects: See HPI above  Current stressors include: Symptoms and See HPI above  Coping mechanisms and supports include: Therapy, Family, Hobbies and Friends    Current medications include:   Current Outpatient Medications "   Medication Sig     buPROPion (WELLBUTRIN SR) 100 MG 12 hr tablet Take 1 tablet (100 mg) by mouth 2 times daily     busPIRone (BUSPAR) 15 MG tablet Take 1 tablet (15 mg) by mouth 2 times daily     Cholecalciferol (VITAMIN D-3 PO)      Fexofenadine HCl (ALLERGY 24-HR PO)      hydrOXYzine (VISTARIL) 25 MG capsule Take 1-2 capsules (25-50 mg) by mouth nightly as needed (sleep)     lamoTRIgine (LAMICTAL) 100 MG tablet Take 1 tablet (100 mg) by mouth daily     lamoTRIgine (LAMICTAL) 25 MG tablet Take 25 mg by mouth daily for 2 weeks then 50 mg daily for 2 weeks then 100 mg daily.     levonorgestrel (MIRENA) 20 MCG/24HR IUD 1 Device by Intrauterine route     Meclizine HCl (ANTIVERT PO)      multivitamin w/minerals (MULTI-VITAMIN) tablet Take 1 tablet by mouth daily     No current facility-administered medications for this visit.       Past Medical/Surgical History:  Past Medical History:   Diagnosis Date     Anxiety 10/17/2019     Depressive disorder      ALICE (generalized anxiety disorder) 9/22/2020     Moderate episode of recurrent major depressive disorder (H) 9/22/2020     Panic disorder       has a past medical history of Anxiety (10/17/2019), Depressive disorder, ALICE (generalized anxiety disorder) (9/22/2020), Moderate episode of recurrent major depressive disorder (H) (9/22/2020), and Panic disorder.    Social History:  Reviewed. No changes to social history except as noted above in HPI.    Vital Signs:   None. This is phone/video visit.     Labs:  Most recent laboratory results reviewed and no new labs.     Review of Systems:  10 systems (general, cardiovascular, respiratory, eyes, ENT, endocrine, GI, , M/S, neurological) were reviewed. Most pertinent finding(s) is/are: denies fever, cough, headaches, shortness of breath, chest pain, N/V, constipation/diarrhea, trouble urinating, aches and pains. The remaining systems are all unremarkable.    Mental Status Examination (limited as this is by  phone/video):  Attitude:  cooperative, pleasant  Oriented to:  person, place, time, and situation  Attention Span and Concentration:  normal  Speech:  clear, coherent, regular rate, rhythm, and volume  Language: intact  Mood: Better  Affect:  appropriate and in normal range, mood congruent and Bright  Associations:  no loose associations  Thought Process:  logical, linear and goal oriented  Thought Content:  no evidence of suicidal ideation or homicidal ideation, no evidence of psychotic thought, no auditory hallucinations present and no visual hallucinations present  Recent and Remote Memory:  Intact to interview. Not formally assessed. No amnesia.  Fund of Knowledge: appropriate  Insight:  good  Judgment:  intact, adequate for safety  Impulse Control:  intact    Suicide Risk Assessment:  Today Pooja High reports no suicidal ideation. Based on all available evidence including the factors cited above, Pooja High does not appear to be at imminent risk for self-harm, does not meet criteria for a 72-hr hold, and therefore remains appropriate for ongoing outpatient level of care.  A thorough assessment of risk factors related to suicide and self-harm have been reviewed and are noted above. The patient convincingly denies suicidality on several occasions. Local community safety resources printed and reviewed for patient to use if needed. There was no deceit detected, and the patient presented in a manner that was believable.     DSM5 Diagnosis:  300.02 (F41.1) Generalized Anxiety Disorder   Bipolar II Disorder  PTSD  Insomnia, unspecified    Medical comorbidities include:   Patient Active Problem List    Diagnosis Date Noted     Bipolar 2 disorder (H) 10/20/2021     Priority: Medium     PTSD (post-traumatic stress disorder) 10/20/2021     Priority: Medium     Moderate episode of recurrent major depressive disorder (H) 09/22/2020     Priority: Medium     ALICE (generalized anxiety disorder) 09/22/2020     Priority:  Medium     Panic disorder without agoraphobia 09/22/2020     Priority: Medium     Anxiety 10/17/2019     Priority: Medium     ACP (advance care planning) 09/22/2020     Priority: Low     Health Care Home 09/22/2020     Priority: Low       Psychosocial & Contextual Factors: see HPI above    Assessment:  5/11/21  Pooja High reports overall some good improvement in her symptoms on Wellbutrin SR and with continued individual therapy.  She takes her Wellbutrin dose pretty early in the morning and so I suggest adding the afternoon dose to prevent the crash she is experiencing.  Increased fatigue and sedation midday likely due to the first Wellbutrin SR dose wearing off.  I also recommend decreasing her citalopram since we will continue to optimize Wellbutrin SR.  She is agreeable to this plan.  I am hoping she might start sleeping a little better after second dose of Wellbutrin wears off.  She will continue to monitor.  She is encouraged to continue with ADHD testing in July.  She may also be starting with a DBT specialist.  She continues to also be open to the idea of ACT therapy. Has Mirena IUD to prevent pregnancy.      7/16/2021:   Patient overall doing quite well.  Found second dose of Wellbutrin SR to be quite helpful.  No decompensation with decreased Celexa dose and so we will continue to taper.  Can always further optimize Wellbutrin SR.  Also has ADHD testing coming up soon.  If affirmative for ADHD, might end up replacing Wellbutrin SR with stimulant medication.  Ran out of BuSpar and did notice slight dip/worsening of symptoms.  Refills sent today for buspirone.  She is encouraged to continue therapy and will continue to look for a good fit.    10/20/2021:  Patient with recent psychological testing.  Diagnosed with PTSD and bipolar 2 disorder.  Seems like it fits per the patient.  Could also see how these are accurate diagnoses.  Discussed treatment with lamotrigine which could be a good mood stabilizer in  her case.  She seems to suffer more from depressive episodes and hypomanic/manic episodes.  Discussed risks and benefits of treatment.  Encouraged to continue to engage in individual therapy, particularly for the trauma component.    1/14/2022:   Patient overall doing a lot better since starting lamotrigine.  Possibly some mild side effects so we will decrease the dose little to 75 mg daily.  No other medication changes today.  We will get hydroxyzine refilled so she is sleeping better.  Anxiety relatively unchanged and so we will increase buspirone to 20 mg twice daily to see if that is more helpful.  Overall doing quite well.  No safety concerns.  No SI.  No problematic drug or alcohol use.    Medication side effects and alternatives were reviewed. Health promotion activities recommended and reviewed today. All questions addressed. Education and counseling completed regarding risks and benefits of medications and psychotherapy options. Recommend therapy for additional support.     Treatment Plan:    Increase buspirone to 20 mg twice daily for anxiety.    Continue hydroxyzine 25-50 mg at bedtime as needed for sleep    Decrease Lamotrigine to 75 mg daily for mood and to see if side effects improve.       Continue Wellbutrin  mg twice daily for mood.    Continue all other cares per primary care provider.     Continue all other medications as reviewed per electronic medical record today.     Safety plan reviewed. To the Emergency Department as needed or call after hours crisis line at [phone] or [phone]. [place] Crisis Text Line. Text [place] to 016981 or Suicide LifeLine Chat: suicidepreventionlifeline.org/chat    Continue therapy as planned.     Schedule an appointment with me in 2 months or sooner as needed. Call [facility] at [phone] to schedule.    Follow up with primary care provider as planned or for acute medical concerns.    Call the psychiatric nurse line with medication  questions or concerns at [phone].    MyChart may be used to communicate with your provider, but this is not intended to be used for emergencies.    Have previously discussed Lamictal (lamotrigine) can cause serious rashes including Mike-Riaz syndrome which may requiring hospitalization and discontinuation of treatment. If any signs of a rash occur, please see your Primary Care Provider or a dermatologist immediately.     Administrative Billing:   Phone Call/Video Duration: 18 Minutes  Start: 1:45p  Stop: 2:03p    Time spent with patient was 18 minutes and greater than 50% of time or 10 minutes was spent in counseling and coordination of care regarding above diagnoses and treatment plan. Patient with multiple psychiatric diagnoses and medication changes adding to complexity of care.    Patient Status:  Patient will continue to be seen for ongoing consultation and stabilization.    Signed:   Magaly Frederick DO  [facility]    Disclaimer: This note consists of symbols derived from keyboarding, dictation and/or voice recognition software. As a result, there may be errors in the script that have gone undetected. Please consider this when interpreting information found in this chart.

## 2022-01-15 ASSESSMENT — ANXIETY QUESTIONNAIRES: GAD7 TOTAL SCORE: 14

## 2022-01-15 ASSESSMENT — PATIENT HEALTH QUESTIONNAIRE - PHQ9: SUM OF ALL RESPONSES TO PHQ QUESTIONS 1-9: 14

## 2022-01-31 NOTE — PATIENT INSTRUCTIONS
Treatment Plan:    Increase buspirone to 20 mg twice daily for anxiety.    Continue hydroxyzine 25-50 mg at bedtime as needed for sleep    Decrease Lamotrigine to 75 mg daily for mood and to see if side effects improve.       Continue Wellbutrin  mg twice daily for mood.    Continue all other cares per primary care provider.     Continue all other medications as reviewed per electronic medical record today.     Safety plan reviewed. To the Emergency Department as needed or call after hours crisis line at [phone] or [phone]. [place] Crisis Text Line. Text [place] to 296635 or Suicide LifeLine Chat: suicidepreventionlifeline.org/chat    Continue therapy as planned.     Schedule an appointment with me in 2 months or sooner as needed. Call [facility] at [phone] to schedule.    Follow up with primary care provider as planned or for acute medical concerns.    Call the psychiatric nurse line with medication questions or concerns at [phone].    [employer] may be used to communicate with your provider, but this is not intended to be used for emergencies.    Have previously discussed Lamictal (lamotrigine) can cause serious rashes including Mike-Riaz syndrome which may requiring hospitalization and discontinuation of treatment. If any signs of a rash occur, please see your Primary Care Provider or a dermatologist immediately.

## 2022-04-13 ENCOUNTER — MYC MEDICAL ADVICE (OUTPATIENT)
Dept: BEHAVIORAL HEALTH | Facility: CLINIC | Age: 27
End: 2022-04-13

## 2022-04-13 ENCOUNTER — TELEPHONE (OUTPATIENT)
Dept: BEHAVIORAL HEALTH | Facility: CLINIC | Age: 27
End: 2022-04-13

## 2022-04-13 DIAGNOSIS — F41.1 GAD (GENERALIZED ANXIETY DISORDER): ICD-10-CM

## 2022-04-13 DIAGNOSIS — F41.0 PANIC ATTACK: ICD-10-CM

## 2022-04-13 RX ORDER — BUSPIRONE HYDROCHLORIDE 10 MG/1
20 TABLET ORAL 2 TIMES DAILY
Qty: 360 TABLET | Refills: 0 | Status: SHIPPED | OUTPATIENT
Start: 2022-04-13 | End: 2022-07-15

## 2022-04-13 NOTE — TELEPHONE ENCOUNTER
Date of Last Office Visit: 1/14/22   Date of Next Office Visit: 5/6/22  No shows since last visit: none  Cancellations since last visit: none    Medication requested: Buspar 10 mg.  Take 2 tablets (20 mg) by mouth twice daily.  Date last ordered: 1/14/22 Qty: 360 Refills: 0     [place] of [place] ?: not authorized     Lapse in medication adherence greater than 5 days?: no  If yes, call patient and gather details: no  Medication refill request verified as identical to current order?: yes  Result of Last DAM, VPA, Li+ Level, CBC, or Carbamazepine Level (at or since last visit): N/A    Last visit treatment plan: Treatment Plan:    Increase buspirone to 20 mg twice daily for anxiety.    Continue hydroxyzine 25-50 mg at bedtime as needed for sleep    Decrease Lamotrigine to 75 mg daily for mood and to see if side effects improve.       Continue Wellbutrin  mg twice daily for mood.    Continue all other cares per primary care provider.     Continue all other medications as reviewed per electronic medical record today.     Safety plan reviewed. To the Emergency Department as needed or call after hours crisis line at [phone] or [phone]. [place] Crisis Text Line. Text [place] to 337460 or Suicide LifeLine Chat: suicidepreventionlifeline.org/chat    Continue therapy as planned.     Schedule an appointment with me in 2 months or sooner as needed. Call [facility] at [phone] to schedule.    Follow up with primary care provider as planned or for acute medical concerns.    Call the psychiatric nurse line with medication questions or concerns at [phone].    [employer] may be used to communicate with your provider, but this is not intended to be used for emergencies.     Have previously discussed Lamictal (lamotrigine) can cause serious rashes including Mike-Riaz syndrome which may requiring hospitalization and discontinuation of treatment. If any signs of a rash occur, please see your  Primary Care Provider or a dermatologist immediately.     []Medication refilled per  Medication Refill in Ambulatory Care  policy.  [x]Medication unable to be refilled by RN due to criteria not met as indicated below:    []Eligibility - not seen in the last year   []Supervision - no future appointment   []Compliance - no shows, cancellations or lapse in therapy   []Verification - order discrepancy   []Controlled medication   [x]Medication not included in policy   []90-day supply request   []Other

## 2022-04-13 NOTE — TELEPHONE ENCOUNTER
4/13/22    Patient called to schedule a return CCPS appointment and to request a medication refill.     Reason for call:  Medication   If this is a refill request, has the caller requested the refill from the pharmacy already? No  Will the patient be using a [facility]? No  Name of the pharmacy and phone number for the current request: [id number] [address] [facility] [address]  [phone]    Name of the medication requested: busPIRone (BUSPAR) 10 MG tablet    Other request:     Phone number to reach patient:  Home number on file [phone] (home)    Best Time:  ASAP    Can we leave a detailed message on this number?  YES    Travel screening: Not Applicable

## 2022-04-23 ENCOUNTER — MYC REFILL (OUTPATIENT)
Dept: PSYCHIATRY | Facility: CLINIC | Age: 27
End: 2022-04-23

## 2022-04-23 DIAGNOSIS — F31.81 BIPOLAR II DISORDER (H): ICD-10-CM

## 2022-04-25 RX ORDER — LAMOTRIGINE 25 MG/1
75 TABLET ORAL DAILY
Qty: 90 TABLET | Refills: 0 | Status: SHIPPED | OUTPATIENT
Start: 2022-04-25 | End: 2022-05-06

## 2022-04-25 NOTE — TELEPHONE ENCOUNTER
Date of Last Office Visit: 1/14/22  Date of Next Office Visit: 5/6/22  No shows since last visit: 0  Cancellations since last visit: 0    Medication requested: lamoTRIgine (LAMICTAL) 25 MG tablet Date last ordered: 1/14/22 Qty:270 Refills: 0     [place] of [place] ?:NA    Lapse in medication adherence greater than 5 days?: yes  If yes, call patient and gather details: attempted - Unsuccessful  Medication refill request verified as identical to current order?: yes  Result of Last DAM, VPA, Li+ Level, CBC, or Carbamazepine Level (at or since last visit): NA    Last visit treatment plan:       Increase buspirone to 20 mg twice daily for anxiety.    Continue hydroxyzine 25-50 mg at bedtime as needed for sleep    Decrease Lamotrigine to 75 mg daily for mood and to see if side effects improve.       Continue Wellbutrin  mg twice daily for mood.    Continue all other cares per primary care provider.     Continue all other medications as reviewed per electronic medical record today.     Safety plan reviewed. To the Emergency Department as needed or call after hours crisis line at [phone] or [phone]. [place] Crisis Text Line. Text [place] to 767027 or Suicide LifeLine Chat: suicidepreventionlifeline.org/chat    Continue therapy as planned.     Schedule an appointment with me in 2 months or sooner as needed. Call [facility] at [phone] to schedule.      []Medication refilled per  Medication Refill in Ambulatory Care  policy.  [x]Medication unable to be refilled by RN due to criteria not met as indicated below:    []Eligibility - not seen in the last year   []Supervision - no future appointment   []Compliance - no shows, cancellations or lapse in therapy   []Verification - order discrepancy   []Controlled medication   [x]Medication not included in policy   [x]90-day supply request   []Other

## 2022-05-06 ENCOUNTER — VIRTUAL VISIT (OUTPATIENT)
Dept: PSYCHOLOGY | Facility: CLINIC | Age: 27
End: 2022-05-06
Payer: COMMERCIAL

## 2022-05-06 ENCOUNTER — VIRTUAL VISIT (OUTPATIENT)
Dept: PSYCHIATRY | Facility: CLINIC | Age: 27
End: 2022-05-06
Payer: COMMERCIAL

## 2022-05-06 DIAGNOSIS — F41.1 GAD (GENERALIZED ANXIETY DISORDER): ICD-10-CM

## 2022-05-06 DIAGNOSIS — F31.81 BIPOLAR II DISORDER (H): Primary | ICD-10-CM

## 2022-05-06 DIAGNOSIS — F43.10 PTSD (POST-TRAUMATIC STRESS DISORDER): ICD-10-CM

## 2022-05-06 DIAGNOSIS — F43.10 POSTTRAUMATIC STRESS DISORDER: ICD-10-CM

## 2022-05-06 DIAGNOSIS — G47.00 INSOMNIA, UNSPECIFIED TYPE: ICD-10-CM

## 2022-05-06 PROCEDURE — 99214 OFFICE O/P EST MOD 30 MIN: CPT | Mod: 25 | Performed by: PSYCHIATRY & NEUROLOGY

## 2022-05-06 PROCEDURE — 90832 PSYTX W PT 30 MINUTES: CPT | Mod: 95 | Performed by: PSYCHOLOGIST

## 2022-05-06 RX ORDER — BUPROPION HYDROCHLORIDE 100 MG/1
100 TABLET, EXTENDED RELEASE ORAL 2 TIMES DAILY
Qty: 60 TABLET | Refills: 1 | Status: SHIPPED | OUTPATIENT
Start: 2022-05-06 | End: 2022-07-15

## 2022-05-06 RX ORDER — LAMOTRIGINE 100 MG/1
100 TABLET ORAL DAILY
Qty: 30 TABLET | Refills: 1 | Status: SHIPPED | OUTPATIENT
Start: 2022-05-06 | End: 2022-07-15

## 2022-05-06 ASSESSMENT — PATIENT HEALTH QUESTIONNAIRE - PHQ9
10. IF YOU CHECKED OFF ANY PROBLEMS, HOW DIFFICULT HAVE THESE PROBLEMS MADE IT FOR YOU TO DO YOUR WORK, TAKE CARE OF THINGS AT HOME, OR GET ALONG WITH OTHER PEOPLE: SOMEWHAT DIFFICULT
SUM OF ALL RESPONSES TO PHQ QUESTIONS 1-9: 13
SUM OF ALL RESPONSES TO PHQ QUESTIONS 1-9: 13

## 2022-05-06 NOTE — PROGRESS NOTES
[facility]  May 6, 2022      Behavioral Health Clinician Progress Note    Patient Name: Pooja High           Service Type:  Individual      Service Location:   [facility]     Session Start Time: 08:00am  Session End Time: 08:25am      Session Length: 16 - 37      Attendees: Patient     Service Modality:  Video Visit:      Provider verified identity through the following two step process.  Patient provided:  Patient is known previously to provider    Telemedicine Visit: The patient's condition can be safely assessed and treated via synchronous audio and visual telemedicine encounter.      Reason for Telemedicine Visit: Services only offered telehealth    Originating Site (Patient Location): Patient's home    Distant Site (Provider Location): Provider Remote Setting- Home Office    Consent:  The patient/guardian has verbally consented to: the potential risks and benefits of telemedicine (video visit) versus in person care; bill my insurance or make self-payment for services provided; and responsibility for payment of non-covered services.     Patient would like the video invitation sent by:  My Chart    Mode of Communication:  Video Conference via [facility]    As the provider I attest to compliance with applicable laws and regulations related to telemedicine.    Visit Activities (Refresh list every visit): [facility] Only    Diagnostic Assessment Date: 04/12/2021  Treatment Plan Review Date: 08/06/2022  See Flowsheets for today's PHQ-9 and ALICE-7 results  Previous PHQ-9:   PHQ-9 SCORE 7/28/2021 10/20/2021 1/14/2022   PHQ-9 Total Score MyChart 11 (Moderate depression) 14 (Moderate depression) 14 (Moderate depression)   PHQ-9 Total Score 11 14 14     Previous ALICE-7:   ALICE-7 SCORE 7/20/2021 10/20/2021 1/14/2022   Total Score 10 (moderate anxiety) 14 (moderate anxiety) 14 (moderate anxiety)   Total Score 10 14 14       TOM LEVEL:  No flowsheet data found.    DATA  Extended Session (60+ minutes):  No  Interactive Complexity: No  Crisis: No  [facility] Patient: No    Treatment Objective(s) Addressed in This Session:  Target Behavior(s): depression, anxiety, PTSD symptom management    Depressed Mood: Increase interest, engagement, and pleasure in doing things  Anxiety: will develop more effective coping skills to manage anxiety symptoms  PTSD Symptom Management    Current Stressors / Issues:  Pooja reported she is doing well. She feels more settled at work, doing DBT group/individual every Tuesday and enjoying it. Increasing the Buspar has helped her anxiety. She noted that the combination of medication and DBT skills she is much better able to manage her anxiety but is continuing to notice difficulty focusing particularly at work. She described having many projects started but unable to focus and complete tasks. It happens some at home as well but to a lesser degree. She has an appointment for the final part of her ADHD testing next week and is looking forward to getting the results. Regarding self-harm behaviors, she noted that she became upset a few weeks and was pulling her hair. She noticed what she was doing and used her DBT skills to stop. She processed this with her therapist. No other instance of self-harm. No SI in the last 4 months.      01/14/2022:  Reported that she is doing well. She got a new job as an  at a  center. She continues to do DBT group/individual. She really enjoys the program and appreciates. Lamotrigine has been really helpful for her depressed mood. She has been out of hydroxyzine and has not been sleeping well because of the anxiety keeping her up at night. She gave up caffeine but that did not make a change. She reported that she has lost her appetite and has also had irregular bowel movements. She has also been happening more frequent headaches. She is unsure if these issues are related to lamotrigine or stress.       Progress on Treatment Objective(s) /  Homework:  Satisfactory progress - ACTION (Actively working towards change); Intervened by reinforcing change plan / affirming steps taken    Also provided psychoeducation about behavioral health condition, symptoms, and treatment options    Care Plan review completed: Yes    Medication Review:  Changes to psychiatric medications, see updated Medication List in EPIC.     Medication Compliance:  Yes    Changes in Health Issues:   None reported    Chemical Use Review:   Substance Use: Chemical use reviewed, no active concerns identified      Tobacco Use: No current tobacco use.      Assessment: Current Emotional / Mental Status (status of significant symptoms):  Risk status (Self / Other harm or suicidal ideation)  Patient has had a history of suicidal ideation: off and on; passive and self-injurious behavior: 2013  Patient denies current fears or concerns for personal safety.  Patient denies current or recent suicidal ideation or behaviors.  Patient denies current or recent homicidal ideation or behaviors.  Patient denies current or recent self injurious behavior or ideation.  Patient denies other safety concerns.  A safety and risk management plan has been developed including: Patient consented to co-developed safety plan.  A safety and risk management plan was completed.  Patient agreed to use safety plan should any safety concerns arise.  A copy was given to the patient.    Appearance:   Appropriate   Eye Contact:   Good   Psychomotor Behavior: Normal   Attitude:   Cooperative   Orientation:   All  Speech   Rate / Production: Normal    Volume:  Normal   Mood:    Normal  Affect:    Appropriate   Thought Content:  Clear   Thought Form:  Coherent  Logical   Insight:    Good     Diagnoses:  1. Bipolar II disorder (H)    2. ALICE (generalized anxiety disorder)    3. Posttraumatic stress disorder        Collateral Reports Completed:  Communicated with: Dr. Frederick    Plan: (Homework, other):  Patient was given information  about behavioral services and encouraged to schedule a follow up appointment with the clinic [facility]  She was also given information about mental health symptoms and treatment options .  CD Recommendations: No indications of CD issues.  John Corona PsyD, LP      ______________________________________________________________________    [facility] Psychiatry Treatment Plan    Patient's Name: Pooja High  YOB: 1995    Date of Creation: May 6, 2022  Date Treatment Plan Last Reviewed/Revised: May 6, 2022    DSM5 Diagnoses: 296.89 Bipolar II Disorder With anxious distress, 300.02 (F41.1) Generalized Anxiety Disorder or 309.81 (F43.10) Posttraumatic Stress Disorder (includes Posttraumatic Stress Disorder for Children 6 Years and Younger)  Without dissociative symptoms  Psychosocial / Contextual Factors: poorly controlled symptoms  PROMIS (reviewed every 90 days):   PROMIS 10-Global Health (only subscores and total score):   PROMIS-10 Scores Only 1/14/2022   Global Mental Health Score 7   Global Physical Health Score 13   PROMIS TOTAL - SUBSCORES 20       Referral / Collaboration:  Referral to another professional/service is not indicated at this time..    Anticipated number of session for this episode of care: 5-6  Anticipation frequency of session: As determined by Dr. Frederick  Anticipated Duration of each session: 16-37 minutes  Treatment plan will be reviewed in 90 days or when goals have been changed.       MeasurableTreatment Goal(s) related to diagnosis / functional impairment(s)  Goal 1: Patient will work with providers to manage symptoms    I will know I've met my goal when I can focus and get things done.      Objective #A (Patient Action)  Patient will attend all appointments, take medication as prescribed.  Status: New - Date: 05/06/2022     Intervention(s)  [facility] will Monitor and assist in overcoming barriers to treatment  adherence    Objective #B  Patient will consider all recommendations offered.  Status: New - Date: 05/06/2022      Intervention(s)  [facility] will educate patient on treatment options, clarify concerns, work with pt to overcome any resistance to compliance.      Goal 2: Patient will replace self-harm thoughts/behaviors with self-care activities    I will know I've met my goal when I don't do any form of self-harm.      Objective #A (Patient Action)                          Status: New - Date: 05/06/2022  Patient will assist in creating safety plan.     Intervention(s)  [facility] will assist in creation of safety plan and provide copy to patient.     Objective #B  Patient will report using safety plan as needed.                       Status: New - Date: 05/06/2022     Intervention(s)  [facility] will monitor safety, use of plan, adjust plan as needed, work through any identified barriers/resistance to following her plan.      Patient has reviewed and agreed to the above plan.      Gagandeep Corona PsyD  May 6, 2022

## 2022-05-06 NOTE — PROGRESS NOTES
"Pooja High is a 26 year old who is being evaluated via a billable telephone visit.      What phone number would you like to be contacted at? See Epic     How would you like to obtain your AVS? Katie        Outpatient Psychiatric Progress Note    Name: Pooja High   : 1995                    Primary Care Provider: [facility]   Therapist: Cash henley [facility], DBT    PHQ-9 scores:  PHQ-9 SCORE 10/20/2021 2022 2022   PHQ-9 Total Score MyChart 14 (Moderate depression) 14 (Moderate depression) 13 (Moderate depression)   PHQ-9 Total Score 14 14 13       ALICE-7 scores:  ALICE-7 SCORE 2021 10/20/2021 2022   Total Score 10 (moderate anxiety) 14 (moderate anxiety) 14 (moderate anxiety)   Total Score 10 14 14       Patient Identification:  Patient is a 26 year old,   White Choose not to answer female  who presents for return visit with me.  Patient is currently employed full time. Patient attended the phone/video session alone. Patient prefers to be called: \"Pooja\".    Interim History:  I last saw Pojoa High for outpatient psychiatry return visit on 2022. During that appointment, we:      Increase buspirone to 20 mg twice daily for anxiety.    Continue hydroxyzine 25-50 mg at bedtime as needed for sleep    Decrease Lamotrigine to 75 mg daily for mood and to see if side effects improve.       Continue Wellbutrin  mg twice daily for mood.    : Overall doing really well. Undergoing ADHD testing and gets results next week. Still having focus and concentration struggles despite most other symptoms being under good control.  Has found lamotrigine to be quite helpful for mood stability.  Does make her slightly tired so she takes it at bedtime.  Work is going well.  Continues in DBT therapy.  No acute safety concerns.  No SI.  No problematic drug or alcohol use.    Per [facility], Dr. John Corona, during today's team-based visit:  Pooja reported she is doing well. She " feels more settled at work, doing DBT group/individual every Tuesday and enjoying it. Increasing the Buspar has helped her anxiety. She noted that the combination of medication and DBT skills she is much better able to manage her anxiety but is continuing to notice difficulty focusing particularly at work. She described having many projects started but unable to focus and complete tasks. It happens some at home as well but to a lesser degree. She has an appointment for the final part of her ADHD testing next week and is looking forward to getting the results. Regarding self-harm behaviors, she noted that she became upset a few weeks and was pulling her hair. She noticed what she was doing and used her DBT skills to stop. She processed this with her therapist. No other instance of self-harm. No SI in the last 4 months.     Psychiatric ROS:  Pooja High reports mood has been: Improved/stable  Anxiety has been: Improved/stable, much more manageable  Sleep has been: Worse since being out of hydroxyzine  Radha sxs: None  Psychosis sxs: None  ADHD/ADD sxs: undergoing testing - finishes testing next week and gets results  PTSD sxs: None  PHQ9 and GAD7 scores were reviewed today if completed.   Medication side effects: Some very mild sedation from lamotrigine  Current stressors include: Symptoms and See HPI above  Coping mechanisms and supports include: Therapy, Family, Hobbies and Friends, DBT    Current medications include:   Current Outpatient Medications   Medication Sig     buPROPion (WELLBUTRIN SR) 100 MG 12 hr tablet Take 1 tablet (100 mg) by mouth 2 times daily     busPIRone (BUSPAR) 10 MG tablet Take 2 tablets (20 mg) by mouth 2 times daily     Cholecalciferol (VITAMIN D-3 PO)      Fexofenadine HCl (ALLERGY 24-HR PO)      hydrOXYzine (VISTARIL) 25 MG capsule Take 1-2 capsules (25-50 mg) by mouth nightly as needed (sleep)     lamoTRIgine (LAMICTAL) 25 MG tablet Take 3 tablets (75 mg) by mouth daily      levonorgestrel (MIRENA) 20 MCG/24HR IUD 1 Device by Intrauterine route     Meclizine HCl (ANTIVERT PO)      multivitamin w/minerals (MULTI-VITAMIN) tablet Take 1 tablet by mouth daily     No current facility-administered medications for this visit.       Past Medical/Surgical History:  Past Medical History:   Diagnosis Date     Anxiety 10/17/2019     Depressive disorder      ALICE (generalized anxiety disorder) 9/22/2020     Moderate episode of recurrent major depressive disorder (H) 9/22/2020     Panic disorder       has a past medical history of Anxiety (10/17/2019), Depressive disorder, ALICE (generalized anxiety disorder) (9/22/2020), Moderate episode of recurrent major depressive disorder (H) (9/22/2020), and Panic disorder.    Social History:  Reviewed. No changes to social history except as noted above in HPI.    Vital Signs:   None. This is phone/video visit.     Labs:  Most recent laboratory results reviewed and no new labs.     Review of Systems:  10 systems (general, cardiovascular, respiratory, eyes, ENT, endocrine, GI, , M/S, neurological) were reviewed. Most pertinent finding(s) is/are: denies fever, cough, headaches, shortness of breath, chest pain, N/V, constipation/diarrhea, trouble urinating, aches and pains. The remaining systems are all unremarkable.    Mental Status Examination (limited as this is by phone/video):  Attitude:  cooperative, pleasant  Oriented to:  person, place, time, and situation  Attention Span and Concentration:  normal  Speech:  clear, coherent, regular rate, rhythm, and volume  Language: intact  Mood: Pretty good  Affect:  appropriate and in normal range, mood congruent and Bright  Associations:  no loose associations  Thought Process:  logical, linear and goal oriented  Thought Content:  no evidence of suicidal ideation or homicidal ideation, no evidence of psychotic thought, no auditory hallucinations present and no visual hallucinations present  Recent and Remote Memory:   Intact to interview. Not formally assessed. No amnesia.  Fund of Knowledge: appropriate  Insight:  good  Judgment:  intact, adequate for safety  Impulse Control:  intact    Suicide Risk Assessment:  Today Pooja High reports no suicidal ideation. Based on all available evidence including the factors cited above, Pooja High does not appear to be at imminent risk for self-harm, does not meet criteria for a 72-hr hold, and therefore remains appropriate for ongoing outpatient level of care.  A thorough assessment of risk factors related to suicide and self-harm have been reviewed and are noted above. The patient convincingly denies suicidality on several occasions. Local community safety resources printed and reviewed for patient to use if needed. There was no deceit detected, and the patient presented in a manner that was believable.     DSM5 Diagnosis:  300.02 (F41.1) Generalized Anxiety Disorder   Bipolar II Disorder  PTSD  Insomnia, unspecified  R/O ADHD    Medical comorbidities include:   Patient Active Problem List    Diagnosis Date Noted     Bipolar 2 disorder (H) 10/20/2021     Priority: Medium     PTSD (post-traumatic stress disorder) 10/20/2021     Priority: Medium     Moderate episode of recurrent major depressive disorder (H) 09/22/2020     Priority: Medium     ALICE (generalized anxiety disorder) 09/22/2020     Priority: Medium     Panic disorder without agoraphobia 09/22/2020     Priority: Medium     Anxiety 10/17/2019     Priority: Medium     ACP (advance care planning) 09/22/2020     Priority: Low     Health Care Home 09/22/2020     Priority: Low       Psychosocial & Contextual Factors: see HPI above    Assessment:  5/11/21  Pooja High reports overall some good improvement in her symptoms on Wellbutrin SR and with continued individual therapy.  She takes her Wellbutrin dose pretty early in the morning and so I suggest adding the afternoon dose to prevent the crash she is experiencing.  Increased  fatigue and sedation midday likely due to the first Wellbutrin SR dose wearing off.  I also recommend decreasing her citalopram since we will continue to optimize Wellbutrin SR.  She is agreeable to this plan.  I am hoping she might start sleeping a little better after second dose of Wellbutrin wears off.  She will continue to monitor.  She is encouraged to continue with ADHD testing in July.  She may also be starting with a DBT specialist.  She continues to also be open to the idea of ACT therapy. Has Mirena IUD to prevent pregnancy.      7/16/2021:   Patient overall doing quite well.  Found second dose of Wellbutrin SR to be quite helpful.  No decompensation with decreased Celexa dose and so we will continue to taper.  Can always further optimize Wellbutrin SR.  Also has ADHD testing coming up soon.  If affirmative for ADHD, might end up replacing Wellbutrin SR with stimulant medication.  Ran out of BuSpar and did notice slight dip/worsening of symptoms.  Refills sent today for buspirone.  She is encouraged to continue therapy and will continue to look for a good fit.    10/20/2021:  Patient with recent psychological testing.  Diagnosed with PTSD and bipolar 2 disorder.  Seems like it fits per the patient.  Could also see how these are accurate diagnoses.  Discussed treatment with lamotrigine which could be a good mood stabilizer in her case.  She seems to suffer more from depressive episodes and hypomanic/manic episodes.  Discussed risks and benefits of treatment.  Encouraged to continue to engage in individual therapy, particularly for the trauma component.    1/14/2022:   Patient overall doing a lot better since starting lamotrigine.  Possibly some mild side effects so we will decrease the dose little to 75 mg daily.  No other medication changes today.  We will get hydroxyzine refilled so she is sleeping better.  Anxiety relatively unchanged and so we will increase buspirone to 20 mg twice daily to see if that  is more helpful.  Overall doing quite well.  No safety concerns.  No SI.  No problematic drug or alcohol use.    5/6/2022:   Patient overall has been doing relatively well.  Has noticed how much lamotrigine can be helpful for her symptoms and has requested to try 100 mg daily.  She still reports a little bit of mood instability and so it is reasonable to increase to 100 mg daily.  She will watch for negative side effects.  Completes ADHD testing next week.  She will be sure to send us the report and will call to schedule an appointment after she gets the results.  If affirmative for ADHD, could consider replacing Wellbutrin SR with a stimulant medication or Strattera.  No acute safety concerns.  No SI.  No problematic drug or alcohol use.    Medication side effects and alternatives were reviewed. Health promotion activities recommended and reviewed today. All questions addressed. Education and counseling completed regarding risks and benefits of medications and psychotherapy options. Recommend therapy for additional support.     Treatment Plan:    Continue buspirone 20 mg twice daily for anxiety.    Continue hydroxyzine 25-50 mg at bedtime as needed for sleep    Increase Lamotrigine to 100 mg daily for mood and to see if side effects improve.       Continue Wellbutrin  mg twice daily for mood.    Please send testing ADHD report to fax #: [phone]. Also upload to [employer] in a message to Dr. Corona.     Continue all other cares per primary care provider.     Continue all other medications as reviewed per electronic medical record today.     Safety plan reviewed. To the Emergency Department as needed or call after hours crisis line at [phone] or [phone]. [place] Crisis Text Line. Text [place] to 590553 or Suicide LifeLine Chat: suicidepreventionlifeline.org/chat    Continue therapy as planned.     Schedule an appointment with me once you have testing results, or sooner, as needed. Call [facility] at [phone] to schedule.    Follow up with primary care provider as planned or for acute medical concerns.    Call the psychiatric nurse line with medication questions or concerns at [phone].    MyChart may be used to communicate with your provider, but this is not intended to be used for emergencies.    Have previously discussed Lamictal (lamotrigine) can cause serious rashes including Mike-Riaz syndrome which may requiring hospitalization and discontinuation of treatment. If any signs of a rash occur, please see your Primary Care Provider or a dermatologist immediately.     Administrative Billing:   Phone Call/Video Duration: 11 Minutes  Start: 8:39a  Stop: 8:50a    Time spent with patient was 11 minutes and greater than 50% of time or 6 minutes was spent in counseling and coordination of care regarding above diagnoses and treatment plan. Patient with multiple psychiatric diagnoses and medication change adding to complexity of care.    Patient Status:  Patient will continue to be seen for ongoing consultation and stabilization.    Signed:   Magaly Frederick DO  [facility]    Disclaimer: This note consists of symbols derived from keyboarding, dictation and/or voice recognition software. As a result, there may be errors in the script that have gone undetected. Please consider this when interpreting information found in this chart.

## 2022-05-06 NOTE — PATIENT INSTRUCTIONS
Treatment Plan:  Continue buspirone 20 mg twice daily for anxiety.  Continue hydroxyzine 25-50 mg at bedtime as needed for sleep  Increase Lamotrigine to 100 mg daily for mood and to see if side effects improve.     Continue Wellbutrin  mg twice daily for mood.  Please send testing ADHD report to fax #: [phone]. Also upload to [employer] in a message to Dr. Corona.   Continue all other cares per primary care provider.   Continue all other medications as reviewed per electronic medical record today.   Safety plan reviewed. To the Emergency Department as needed or call after hours crisis line at [phone] or [phone]. [place] Crisis Text Line. Text [place] to 753417 or Suicide LifeLine Chat: suicidepreventionCeQurline.org/chat  Continue therapy as planned.   Schedule an appointment with me once you have testing results, or sooner, as needed. Call [facility] at [phone] to schedule.  Follow up with primary care provider as planned or for acute medical concerns.  Call the psychiatric nurse line with medication questions or concerns at [phone].  [employer] may be used to communicate with your provider, but this is not intended to be used for emergencies.    Have previously discussed Lamictal (lamotrigine) can cause serious rashes including Mike-Riaz syndrome which may requiring hospitalization and discontinuation of treatment. If any signs of a rash occur, please see your Primary Care Provider or a dermatologist immediately.

## 2022-05-07 ASSESSMENT — PATIENT HEALTH QUESTIONNAIRE - PHQ9: SUM OF ALL RESPONSES TO PHQ QUESTIONS 1-9: 13

## 2022-05-10 ENCOUNTER — PSYCHE (OUTPATIENT)
Dept: PSYCHOLOGY | Facility: CLINIC | Age: 27
End: 2022-05-10
Payer: COMMERCIAL

## 2022-05-10 DIAGNOSIS — Z13.39 ATTENTION DEFICIT HYPERACTIVITY DISORDER (ADHD) EVALUATION: Primary | ICD-10-CM

## 2022-05-10 ASSESSMENT — ANXIETY QUESTIONNAIRES
3. WORRYING TOO MUCH ABOUT DIFFERENT THINGS: SEVERAL DAYS
5. BEING SO RESTLESS THAT IT IS HARD TO SIT STILL: SEVERAL DAYS
6. BECOMING EASILY ANNOYED OR IRRITABLE: MORE THAN HALF THE DAYS
GAD7 TOTAL SCORE: 9
1. FEELING NERVOUS, ANXIOUS, OR ON EDGE: MORE THAN HALF THE DAYS
7. FEELING AFRAID AS IF SOMETHING AWFUL MIGHT HAPPEN: SEVERAL DAYS
2. NOT BEING ABLE TO STOP OR CONTROL WORRYING: SEVERAL DAYS
4. TROUBLE RELAXING: SEVERAL DAYS

## 2022-05-10 ASSESSMENT — PATIENT HEALTH QUESTIONNAIRE - PHQ9
10. IF YOU CHECKED OFF ANY PROBLEMS, HOW DIFFICULT HAVE THESE PROBLEMS MADE IT FOR YOU TO DO YOUR WORK, TAKE CARE OF THINGS AT HOME, OR GET ALONG WITH OTHER PEOPLE: SOMEWHAT DIFFICULT
SUM OF ALL RESPONSES TO PHQ QUESTIONS 1-9: 11
SUM OF ALL RESPONSES TO PHQ QUESTIONS 1-9: 11

## 2022-05-10 NOTE — PROGRESS NOTES
Answers for HPI/ROS submitted by the patient on 5/10/2022  If you checked off any problems, how difficult have these problems made it for you to do your work, take care of things at home, or get along with other people?: Somewhat difficult  PHQ9 TOTAL SCORE: 11  ALICE-7: 9    Ms. High presented today for cognitive testing as part of the ADHD Evaluation. She was administered Trails [facility] and the WAIS-IV.    Testing [phone] (1.25)  Scoring [phone] (.25)

## 2022-05-11 ASSESSMENT — PATIENT HEALTH QUESTIONNAIRE - PHQ9: SUM OF ALL RESPONSES TO PHQ QUESTIONS 1-9: 11

## 2022-05-11 ASSESSMENT — ANXIETY QUESTIONNAIRES: GAD7 TOTAL SCORE: 9

## 2022-05-16 ENCOUNTER — FCC EXTENDED DOCUMENTATION (OUTPATIENT)
Dept: PSYCHOLOGY | Facility: CLINIC | Age: 27
End: 2022-05-16

## 2022-05-16 ENCOUNTER — VIRTUAL VISIT (OUTPATIENT)
Dept: PSYCHOLOGY | Facility: CLINIC | Age: 27
End: 2022-05-16
Payer: COMMERCIAL

## 2022-05-16 DIAGNOSIS — Z86.59 HX OF BORDERLINE PERSONALITY DISORDER: ICD-10-CM

## 2022-05-16 DIAGNOSIS — F43.10 POSTTRAUMATIC STRESS DISORDER: Primary | ICD-10-CM

## 2022-05-16 DIAGNOSIS — F31.81 BIPOLAR II DISORDER (H): ICD-10-CM

## 2022-05-16 DIAGNOSIS — Z13.39 ATTENTION DEFICIT HYPERACTIVITY DISORDER (ADHD) EVALUATION: ICD-10-CM

## 2022-05-16 PROCEDURE — 96137 PSYCL/NRPSYC TST PHY/QHP EA: CPT | Mod: GT | Performed by: PSYCHOLOGIST

## 2022-05-16 PROCEDURE — 96136 PSYCL/NRPSYC TST PHY/QHP 1ST: CPT | Mod: GT | Performed by: PSYCHOLOGIST
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
## 2022-07-15 ENCOUNTER — VIRTUAL VISIT (OUTPATIENT)
Dept: PSYCHOLOGY | Facility: CLINIC | Age: 27
End: 2022-07-15
Payer: COMMERCIAL

## 2022-07-15 ENCOUNTER — VIRTUAL VISIT (OUTPATIENT)
Dept: PSYCHIATRY | Facility: CLINIC | Age: 27
End: 2022-07-15
Payer: COMMERCIAL

## 2022-07-15 DIAGNOSIS — G47.00 INSOMNIA, UNSPECIFIED TYPE: ICD-10-CM

## 2022-07-15 DIAGNOSIS — F31.81 BIPOLAR II DISORDER (H): Primary | ICD-10-CM

## 2022-07-15 DIAGNOSIS — F41.1 GAD (GENERALIZED ANXIETY DISORDER): ICD-10-CM

## 2022-07-15 DIAGNOSIS — F43.10 POSTTRAUMATIC STRESS DISORDER: ICD-10-CM

## 2022-07-15 DIAGNOSIS — F43.10 PTSD (POST-TRAUMATIC STRESS DISORDER): ICD-10-CM

## 2022-07-15 PROCEDURE — 99214 OFFICE O/P EST MOD 30 MIN: CPT | Mod: 95 | Performed by: PSYCHIATRY & NEUROLOGY

## 2022-07-15 PROCEDURE — 90832 PSYTX W PT 30 MINUTES: CPT | Mod: 95 | Performed by: PSYCHOLOGIST

## 2022-07-15 RX ORDER — HYDROXYZINE PAMOATE 25 MG/1
25-50 CAPSULE ORAL
Qty: 60 CAPSULE | Refills: 1 | Status: SHIPPED | OUTPATIENT
Start: 2022-07-15 | End: 2023-01-25

## 2022-07-15 RX ORDER — BUSPIRONE HYDROCHLORIDE 10 MG/1
20 TABLET ORAL 2 TIMES DAILY
Qty: 360 TABLET | Refills: 0 | Status: SHIPPED | OUTPATIENT
Start: 2022-07-15 | End: 2022-10-17

## 2022-07-15 RX ORDER — LAMOTRIGINE 100 MG/1
100 TABLET ORAL DAILY
Qty: 90 TABLET | Refills: 0 | Status: SHIPPED | OUTPATIENT
Start: 2022-07-15 | End: 2022-10-20

## 2022-07-15 RX ORDER — BUPROPION HYDROCHLORIDE 100 MG/1
100 TABLET, EXTENDED RELEASE ORAL 2 TIMES DAILY
Qty: 180 TABLET | Refills: 0 | Status: SHIPPED | OUTPATIENT
Start: 2022-07-15 | End: 2022-10-17

## 2022-07-15 ASSESSMENT — ANXIETY QUESTIONNAIRES
GAD7 TOTAL SCORE: 13
5. BEING SO RESTLESS THAT IT IS HARD TO SIT STILL: SEVERAL DAYS
7. FEELING AFRAID AS IF SOMETHING AWFUL MIGHT HAPPEN: MORE THAN HALF THE DAYS
4. TROUBLE RELAXING: SEVERAL DAYS
GAD7 TOTAL SCORE: 13
2. NOT BEING ABLE TO STOP OR CONTROL WORRYING: MORE THAN HALF THE DAYS
7. FEELING AFRAID AS IF SOMETHING AWFUL MIGHT HAPPEN: MORE THAN HALF THE DAYS
3. WORRYING TOO MUCH ABOUT DIFFERENT THINGS: MORE THAN HALF THE DAYS
1. FEELING NERVOUS, ANXIOUS, OR ON EDGE: NEARLY EVERY DAY
8. IF YOU CHECKED OFF ANY PROBLEMS, HOW DIFFICULT HAVE THESE MADE IT FOR YOU TO DO YOUR WORK, TAKE CARE OF THINGS AT HOME, OR GET ALONG WITH OTHER PEOPLE?: SOMEWHAT DIFFICULT
6. BECOMING EASILY ANNOYED OR IRRITABLE: MORE THAN HALF THE DAYS
GAD7 TOTAL SCORE: 13

## 2022-07-15 ASSESSMENT — PATIENT HEALTH QUESTIONNAIRE - PHQ9
SUM OF ALL RESPONSES TO PHQ QUESTIONS 1-9: 10
10. IF YOU CHECKED OFF ANY PROBLEMS, HOW DIFFICULT HAVE THESE PROBLEMS MADE IT FOR YOU TO DO YOUR WORK, TAKE CARE OF THINGS AT HOME, OR GET ALONG WITH OTHER PEOPLE: SOMEWHAT DIFFICULT
SUM OF ALL RESPONSES TO PHQ QUESTIONS 1-9: 10

## 2022-07-15 NOTE — PROGRESS NOTES
"Pooja High is a 27 year old who is being evaluated via a billable telephone visit.      What phone number would you like to be contacted at? See Epic     How would you like to obtain your AVS? Katie        Outpatient Psychiatric Progress Note    Name: Pooja High   : 1995                    Primary Care Provider: [facility]   Therapist: Cash henley [facility], DBT    PHQ-9 scores:  PHQ-9 SCORE 2022 5/10/2022 7/15/2022   PHQ-9 Total Score MyChart 13 (Moderate depression) 11 (Moderate depression) 10 (Moderate depression)   PHQ-9 Total Score 13 11 10       ALICE-7 scores:  ALICE-7 SCORE 2022 5/10/2022 7/15/2022   Total Score 14 (moderate anxiety) - 13 (moderate anxiety)   Total Score 14 9 13       Patient Identification:  Patient is a 27 year old,   White Choose not to answer female  who presents for return visit with me.  Patient is currently employed full time. Patient attended the phone/video session alone. Patient prefers to be called: \"Pooja\".    Interim History:    I last saw Pooja High for outpatient psychiatry return visit on 2022. During that appointment, we: Continue buspirone 20 mg twice daily for anxiety.    Continue hydroxyzine 25-50 mg at bedtime as needed for sleep    Increase Lamotrigine to 100 mg daily for mood and to see if side effects improve.       Continue Wellbutrin  mg twice daily for mood.    Please send testing ADHD report to fax #: [phone]. Also upload to [employer] in a message to Dr. Corona.     7/15: Pt overall doing ok. Anxiety a little high today since having a  credentialing visit but otherwise has been doing really well.  Continues in DBT.  Tolerating medications well with no major side effects.  Mood has been really good.  No safety concerns.  No SI.  No problematic drug or alcohol use.    Per [facility], Dr. John Corona, during today's team-based visit:  Reported that she is doing very well. She has felt much calmer and " better able to manage circumstances. Her mood is more positive and she is very pleased with her progress. She continues to work in DBT and stated the group is about 75% completed. She has found it very helpful. She received the results of her ADHD testing which did not confirm a diagnosis. She expressed having mixed feelings about the result because she does experience some memory types of issues. Reviewed the test findings with her indicating her memory concerns are better accounted for by distraction from mood symptoms. She indicated she had a better understanding now after that was explained to her in more detail. Overall, she feels her medications are working well and primarily needs refills from Dr. Frederick today.    Psychiatric ROS:  Pooja High reports mood has been: Improved/stable  Anxiety has been: Improved/stable, much more manageable  Sleep has been: Stable  Radha sxs: None  Psychosis sxs: None  ADHD/ADD sxs: N/A  PTSD sxs: None  PHQ9 and GAD7 scores were reviewed today if completed.   Medication side effects: Denies  Current stressors include: Symptoms and See HPI above  Coping mechanisms and supports include: Therapy, Family, Hobbies and Friends, DBT    Current medications include:   Current Outpatient Medications   Medication Sig     buPROPion (WELLBUTRIN SR) 100 MG 12 hr tablet Take 1 tablet (100 mg) by mouth 2 times daily     busPIRone (BUSPAR) 10 MG tablet Take 2 tablets (20 mg) by mouth 2 times daily     Cholecalciferol (VITAMIN D-3 PO)      Fexofenadine HCl (ALLERGY 24-HR PO)      hydrOXYzine (VISTARIL) 25 MG capsule Take 1-2 capsules (25-50 mg) by mouth nightly as needed (sleep)     lamoTRIgine (LAMICTAL) 100 MG tablet Take 1 tablet (100 mg) by mouth daily     levonorgestrel (MIRENA) 20 MCG/24HR IUD 1 Device by Intrauterine route     Meclizine HCl (ANTIVERT PO)      multivitamin w/minerals (MULTI-VITAMIN) tablet Take 1 tablet by mouth daily     No current facility-administered medications for  this visit.       Past Medical/Surgical History:  Past Medical History:   Diagnosis Date     Anxiety 10/17/2019     Depressive disorder      ALICE (generalized anxiety disorder) 9/22/2020     Moderate episode of recurrent major depressive disorder (H) 9/22/2020     Panic disorder       has a past medical history of Anxiety (10/17/2019), Depressive disorder, ALICE (generalized anxiety disorder) (9/22/2020), Moderate episode of recurrent major depressive disorder (H) (9/22/2020), and Panic disorder.    Social History:  Reviewed. No changes to social history except as noted above in HPI.    Vital Signs:   None. This is phone/video visit.     Labs:  Most recent laboratory results reviewed and no new labs.     Review of Systems:  10 systems (general, cardiovascular, respiratory, eyes, ENT, endocrine, GI, , M/S, neurological) were reviewed. Most pertinent finding(s) is/are: denies fever, cough, headaches, shortness of breath, chest pain, N/V, constipation/diarrhea, trouble urinating, aches and pains. The remaining systems are all unremarkable.    Mental Status Examination (limited as this is by phone/video):  Attitude:  cooperative, pleasant  Oriented to:  person, place, time, and situation  Attention Span and Concentration:  normal  Speech:  clear, coherent, regular rate, rhythm, and volume  Language: intact  Mood: Really good  Affect:  appropriate and in normal range, mood congruent and Bright  Associations:  no loose associations  Thought Process:  logical, linear and goal oriented  Thought Content:  no evidence of suicidal ideation or homicidal ideation, no evidence of psychotic thought, no auditory hallucinations present and no visual hallucinations present  Recent and Remote Memory:  Intact to interview. Not formally assessed. No amnesia.  Fund of Knowledge: appropriate  Insight:  good  Judgment:  intact, adequate for safety  Impulse Control:  intact    Suicide Risk Assessment:  Today Pooja High reports no suicidal  ideation. Based on all available evidence including the factors cited above, Pooja High does not appear to be at imminent risk for self-harm, does not meet criteria for a 72-hr hold, and therefore remains appropriate for ongoing outpatient level of care.  A thorough assessment of risk factors related to suicide and self-harm have been reviewed and are noted above. The patient convincingly denies suicidality on several occasions. Local community safety resources printed and reviewed for patient to use if needed. There was no deceit detected, and the patient presented in a manner that was believable.     DSM5 Diagnosis:  300.02 (F41.1) Generalized Anxiety Disorder   Bipolar II Disorder  PTSD  Insomnia, unspecified    Medical comorbidities include:   Patient Active Problem List    Diagnosis Date Noted     Bipolar 2 disorder (H) 10/20/2021     Priority: Medium     PTSD (post-traumatic stress disorder) 10/20/2021     Priority: Medium     Moderate episode of recurrent major depressive disorder (H) 09/22/2020     Priority: Medium     ALICE (generalized anxiety disorder) 09/22/2020     Priority: Medium     Panic disorder without agoraphobia 09/22/2020     Priority: Medium     Anxiety 10/17/2019     Priority: Medium     ACP (advance care planning) 09/22/2020     Priority: Low     Health Care Home 09/22/2020     Priority: Low       Psychosocial & Contextual Factors: see HPI above    Assessment:  5/11/21  Pooja High reports overall some good improvement in her symptoms on Wellbutrin SR and with continued individual therapy.  She takes her Wellbutrin dose pretty early in the morning and so I suggest adding the afternoon dose to prevent the crash she is experiencing.  Increased fatigue and sedation midday likely due to the first Wellbutrin SR dose wearing off.  I also recommend decreasing her citalopram since we will continue to optimize Wellbutrin SR.  She is agreeable to this plan.  I am hoping she might start sleeping a  little better after second dose of Wellbutrin wears off.  She will continue to monitor.  She is encouraged to continue with ADHD testing in July.  She may also be starting with a DBT specialist.  She continues to also be open to the idea of ACT therapy. Has Mirena IUD to prevent pregnancy.      7/16/2021:   Patient overall doing quite well.  Found second dose of Wellbutrin SR to be quite helpful.  No decompensation with decreased Celexa dose and so we will continue to taper.  Can always further optimize Wellbutrin SR.  Also has ADHD testing coming up soon.  If affirmative for ADHD, might end up replacing Wellbutrin SR with stimulant medication.  Ran out of BuSpar and did notice slight dip/worsening of symptoms.  Refills sent today for buspirone.  She is encouraged to continue therapy and will continue to look for a good fit.    10/20/2021:  Patient with recent psychological testing.  Diagnosed with PTSD and bipolar 2 disorder.  Seems like it fits per the patient.  Could also see how these are accurate diagnoses.  Discussed treatment with lamotrigine which could be a good mood stabilizer in her case.  She seems to suffer more from depressive episodes and hypomanic/manic episodes.  Discussed risks and benefits of treatment.  Encouraged to continue to engage in individual therapy, particularly for the trauma component.    1/14/2022:   Patient overall doing a lot better since starting lamotrigine.  Possibly some mild side effects so we will decrease the dose little to 75 mg daily.  No other medication changes today.  We will get hydroxyzine refilled so she is sleeping better.  Anxiety relatively unchanged and so we will increase buspirone to 20 mg twice daily to see if that is more helpful.  Overall doing quite well.  No safety concerns.  No SI.  No problematic drug or alcohol use.    5/6/2022:   Patient overall has been doing relatively well.  Has noticed how much lamotrigine can be helpful for her symptoms and has  requested to try 100 mg daily.  She still reports a little bit of mood instability and so it is reasonable to increase to 100 mg daily.  She will watch for negative side effects.  Completes ADHD testing next week.  She will be sure to send us the report and will call to schedule an appointment after she gets the results.  If affirmative for ADHD, could consider replacing Wellbutrin SR with a stimulant medication or Strattera.  No acute safety concerns.  No SI.  No problematic drug or alcohol use.    7/15/2022:  Patient overall has been doing really well.  No medication changes.  Tolerating meds well.  Was in a little bit of a hurry due to a surprise credentialing visit at her  today so we will discuss possible [employer] graduation at her next visit due to ongoing stability, not today.  Refills provided and she will be seen back in 2 months.  No acute safety concerns.  No SI.  No problematic drug or alcohol use.    Medication side effects and alternatives were reviewed. Health promotion activities recommended and reviewed today. All questions addressed. Education and counseling completed regarding risks and benefits of medications and psychotherapy options. Recommend therapy for additional support.     Treatment Plan:    Continue buspirone 20 mg twice daily for anxiety.    Continue hydroxyzine 25-50 mg at bedtime as needed for sleep    Continue Lamotrigine 100 mg daily for mood and to see if side effects improve.       Continue Wellbutrin  mg twice daily for mood.    Continue all other cares per primary care provider.     Continue all other medications as reviewed per electronic medical record today.     Safety plan reviewed. To the Emergency Department as needed or call after hours crisis line at [phone] or [phone]. [place] Crisis Text Line. Text [place] to 549340 or Suicide LifeLine Chat: suicidepreventionlifeline.org/chat    Continue therapy as planned.     Schedule an appointment with me in 2  months, or sooner, as needed. Call [facility] at [phone] to schedule.    Follow up with primary care provider as planned or for acute medical concerns.    Call the psychiatric nurse line with medication questions or concerns at [phone].    MyChart may be used to communicate with your provider, but this is not intended to be used for emergencies.    Have previously discussed Lamictal (lamotrigine) can cause serious rashes including Mike-Riaz syndrome which may requiring hospitalization and discontinuation of treatment. If any signs of a rash occur, please see your Primary Care Provider or a dermatologist immediately.     Administrative Billing:   Phone Call/Video Duration: 6 Minutes  Start: 8:08a  Stop: 8:14a    Time spent with patient was 6 minutes and greater than 50% of time or 4 minutes was spent in counseling and coordination of care regarding above diagnoses and treatment plan.     Patient Status:  Patient will continue to be seen for ongoing consultation and stabilization.    Signed:   Magaly Frederick DO  [facility]    Disclaimer: This note consists of symbols derived from keyboarding, dictation and/or voice recognition software. As a result, there may be errors in the script that have gone undetected. Please consider this when interpreting information found in this chart.

## 2022-07-15 NOTE — PATIENT INSTRUCTIONS
Treatment Plan:  Continue buspirone 20 mg twice daily for anxiety.  Continue hydroxyzine 25-50 mg at bedtime as needed for sleep  Continue Lamotrigine 100 mg daily for mood and to see if side effects improve.     Continue Wellbutrin  mg twice daily for mood.  Continue all other cares per primary care provider.   Continue all other medications as reviewed per electronic medical record today.   Safety plan reviewed. To the Emergency Department as needed or call after hours crisis line at [phone] or [phone]. [place] Crisis Text Line. Text [place] to 285322 or Suicide LifeLine Chat: suicidepreventionlifeline.org/chat  Continue therapy as planned.   Schedule an appointment with me in 2 months, or sooner, as needed. Call [facility] at [phone] to schedule.  Follow up with primary care provider as planned or for acute medical concerns.  Call the psychiatric nurse line with medication questions or concerns at [phone].  [employer] may be used to communicate with your provider, but this is not intended to be used for emergencies.    Have previously discussed Lamictal (lamotrigine) can cause serious rashes including Mike-Riaz syndrome which may requiring hospitalization and discontinuation of treatment. If any signs of a rash occur, please see your Primary Care Provider or a dermatologist immediately.

## 2022-07-15 NOTE — Clinical Note
Please call this patient to get them scheduled for a follow-up visit in 2 months. Please schedule with me and the [facility]. Thanks!

## 2022-07-15 NOTE — PROGRESS NOTES
[facility]  July 15, 2022      Behavioral Health Clinician Progress Note    Patient Name: Pooja High           Service Type:  Individual      Service Location:   [facility]     Session Start Time: 07:30 am  Session End Time: 07:55 am      Session Length: 16 - 37      Attendees: Patient     Service Modality:  Video Visit:      Provider verified identity through the following two step process.  Patient provided:  Patient is known previously to provider    Telemedicine Visit: The patient's condition can be safely assessed and treated via synchronous audio and visual telemedicine encounter.      Reason for Telemedicine Visit: Services only offered telehealth    Originating Site (Patient Location): Patient's home    Distant Site (Provider Location): Provider Remote Setting- Home Office    Consent:  The patient/guardian has verbally consented to: the potential risks and benefits of telemedicine (video visit) versus in person care; bill my insurance or make self-payment for services provided; and responsibility for payment of non-covered services.     Patient would like the video invitation sent by:  My Chart    Mode of Communication:  Video Conference via [facility]    As the provider I attest to compliance with applicable laws and regulations related to telemedicine.    Visit Activities (Refresh list every visit): [facility] Only    Diagnostic Assessment Date: 04/12/2021  Treatment Plan Review Date: 08/06/2022  See Flowsheets for today's PHQ-9 and ALICE-7 results  Previous PHQ-9:   PHQ-9 SCORE 5/6/2022 5/10/2022 7/15/2022   PHQ-9 Total Score MyChart 13 (Moderate depression) 11 (Moderate depression) 10 (Moderate depression)   PHQ-9 Total Score 13 11 10     Previous ALICE-7:   ALICE-7 SCORE 1/14/2022 5/10/2022 7/15/2022   Total Score 14 (moderate anxiety) - 13 (moderate anxiety)   Total Score 14 9 13       DATA  Extended Session (60+ minutes): No  Interactive Complexity: No  Crisis:  No  [facility] Patient: No    Treatment Objective(s) Addressed in This Session:  Target Behavior(s): depression, anxiety, PTSD symptom management    Depressed Mood: Increase interest, engagement, and pleasure in doing things  Anxiety: will develop more effective coping skills to manage anxiety symptoms  PTSD Symptom Management    Current Stressors / Issues:  Reported that she is doing very well. She has felt much calmer and better able to manage circumstances. Her mood is more positive and she is very pleased with her progress. She continues to work in DBT and stated the group is about 75% completed. She has found it very helpful. She received the results of her ADHD testing which did not confirm a diagnosis. She expressed having mixed feelings about the result because she does experience some memory types of issues. Reviewed the test findings with her indicating her memory concerns are better accounted for by distraction from mood symptoms. She indicated she had a better understanding now after that was explained to her in more detail. Overall, she feels her medications are working well and primarily needs refills from Dr. Frederick today.      05/06/2022:  Pooja reported she is doing well. She feels more settled at work, doing DBT group/individual every Tuesday and enjoying it. Increasing the Buspar has helped her anxiety. She noted that the combination of medication and DBT skills she is much better able to manage her anxiety but is continuing to notice difficulty focusing particularly at work. She described having many projects started but unable to focus and complete tasks. It happens some at home as well but to a lesser degree. She has an appointment for the final part of her ADHD testing next week and is looking forward to getting the results. Regarding self-harm behaviors, she noted that she became upset a few weeks and was pulling her hair. She noticed what she was doing and used her DBT skills to stop. She processed  this with her therapist. No other instance of self-harm. No SI in the last 4 months.      01/14/2022:  Reported that she is doing well. She got a new job as an  at a  center. She continues to do DBT group/individual. She really enjoys the program and appreciates. Lamotrigine has been really helpful for her depressed mood. She has been out of hydroxyzine and has not been sleeping well because of the anxiety keeping her up at night. She gave up caffeine but that did not make a change. She reported that she has lost her appetite and has also had irregular bowel movements. She has also been happening more frequent headaches. She is unsure if these issues are related to lamotrigine or stress.       Progress on Treatment Objective(s) / Homework:  Satisfactory progress - ACTION (Actively working towards change); Intervened by reinforcing change plan / affirming steps taken    Also provided psychoeducation about behavioral health condition, symptoms, and treatment options    Care Plan review completed: Yes    Medication Review:  Changes to psychiatric medications, see updated Medication List in EPIC.     Medication Compliance:  Yes    Changes in Health Issues:   None reported    Chemical Use Review:   Substance Use: Chemical use reviewed, no active concerns identified      Tobacco Use: No current tobacco use.      Assessment: Current Emotional / Mental Status (status of significant symptoms):  Risk status (Self / Other harm or suicidal ideation)  Patient has had a history of suicidal ideation: off and on; passive and self-injurious behavior: 2013  Patient denies current fears or concerns for personal safety.  Patient denies current or recent suicidal ideation or behaviors.  Patient denies current or recent homicidal ideation or behaviors.  Patient denies current or recent self injurious behavior or ideation.  Patient denies other safety concerns.  A safety and risk management plan has been  developed including: Patient consented to co-developed safety plan.  A safety and risk management plan was completed.  Patient agreed to use safety plan should any safety concerns arise.  A copy was given to the patient.    Appearance:   Appropriate   Eye Contact:   Good   Psychomotor Behavior: Normal   Attitude:   Cooperative   Orientation:   All  Speech   Rate / Production: Normal    Volume:  Normal   Mood:    Normal  Affect:    Appropriate   Thought Content:  Clear   Thought Form:  Coherent  Logical   Insight:    Good     Diagnoses:  1. Bipolar II disorder (H)    2. ALICE (generalized anxiety disorder)    3. Posttraumatic stress disorder        Collateral Reports Completed:  Communicated with: Dr. Frederick    Plan: (Homework, other):  Patient was given information about behavioral services and encouraged to schedule a follow up appointment with the clinic [facility]  She was also given information about mental health symptoms and treatment options .  CD Recommendations: No indications of CD issues.  John Corona PsyD, LP      ______________________________________________________________________    [facility] - [place]: Treatment Plan    Patient's Name: Pooja High  YOB: 1995    Date of Creation: May 6, 2022  Date Treatment Plan Last Reviewed/Revised: May 6, 2022    DSM5 Diagnoses: 296.89 Bipolar II Disorder With anxious distress, 300.02 (F41.1) Generalized Anxiety Disorder or 309.81 (F43.10) Posttraumatic Stress Disorder (includes Posttraumatic Stress Disorder for Children 6 Years and Younger)  Without dissociative symptoms  Psychosocial / Contextual Factors: poorly controlled symptoms    PROMIS (reviewed every 90 days):   PROMIS 10-Global Health (only subscores and total score):   PROMIS-10 Scores Only 1/14/2022 5/6/2022 5/6/2022   Global Mental Health Score 7 10 10   Global Physical Health Score 13 13 13   PROMIS TOTAL -  SUBSCORES 20 23 23       Referral / Collaboration:  Referral to another professional/service is not indicated at this time..    Anticipated number of session for this episode of care: 5-6  Anticipation frequency of session: As determined by Dr. Frederick  Anticipated Duration of each session: 16-37 minutes  Treatment plan will be reviewed in 90 days or when goals have been changed.     MeasurableTreatment Goal(s) related to diagnosis / functional impairment(s)  Goal 1: Patient will work with providers to manage symptoms    I will know I've met my goal when I can focus and get things done.      Objective #A (Patient Action)  Patient will attend all appointments, take medication as prescribed.  Status: New - Date: 05/06/2022     Intervention(s)  [facility] will Monitor and assist in overcoming barriers to treatment adherence    Objective #B  Patient will consider all recommendations offered.  Status: New - Date: 05/06/2022      Intervention(s)  [facility] will educate patient on treatment options, clarify concerns, work with pt to overcome any resistance to compliance.      Goal 2: Patient will replace self-harm thoughts/behaviors with self-care activities    I will know I've met my goal when I don't do any form of self-harm.      Objective #A (Patient Action)                          Status: New - Date: 05/06/2022  Patient will assist in creating safety plan.     Intervention(s)  [facility] will assist in creation of safety plan and provide copy to patient.     Objective #B  Patient will report using safety plan as needed.                       Status: New - Date: 05/06/2022     Intervention(s)  [facility] will monitor safety, use of plan, adjust plan as needed, work through any identified barriers/resistance to following her plan.      Patient has reviewed and agreed to the above plan.      Gagandeep Corona PsyD  May 6, 2022

## 2022-07-18 ENCOUNTER — MYC REFILL (OUTPATIENT)
Dept: PSYCHIATRY | Facility: CLINIC | Age: 27
End: 2022-07-18

## 2022-07-18 DIAGNOSIS — F31.81 BIPOLAR II DISORDER (H): ICD-10-CM

## 2022-07-19 RX ORDER — LAMOTRIGINE 100 MG/1
100 TABLET ORAL DAILY
Qty: 90 TABLET | Refills: 0 | OUTPATIENT
Start: 2022-07-19

## 2022-10-17 ENCOUNTER — MYC REFILL (OUTPATIENT)
Dept: PSYCHIATRY | Facility: CLINIC | Age: 27
End: 2022-10-17

## 2022-10-17 DIAGNOSIS — F41.1 GAD (GENERALIZED ANXIETY DISORDER): ICD-10-CM

## 2022-10-17 DIAGNOSIS — F31.81 BIPOLAR II DISORDER (H): ICD-10-CM

## 2022-10-17 RX ORDER — BUPROPION HYDROCHLORIDE 100 MG/1
100 TABLET, EXTENDED RELEASE ORAL 2 TIMES DAILY
Qty: 180 TABLET | Refills: 0 | Status: SHIPPED | OUTPATIENT
Start: 2022-10-17 | End: 2023-01-25

## 2022-10-17 RX ORDER — BUSPIRONE HYDROCHLORIDE 10 MG/1
20 TABLET ORAL 2 TIMES DAILY
Qty: 360 TABLET | Refills: 0 | Status: SHIPPED | OUTPATIENT
Start: 2022-10-17 | End: 2023-01-09

## 2022-10-17 NOTE — TELEPHONE ENCOUNTER
Date of Last Office Visit: 7/15/2022  Date of Next Office Visit: none scheduled. Will route to [facility]  No shows since last visit: 0  Cancellations since last visit: 0    Medication requested: buPROPion (WELLBUTRIN SR) 100 MG 12 hr tablet Date last ordered: 7/15/2022 Qty: 180 Refills: 0     Review of [place] ?: N/A    Lapse in medication adherence greater than 5 days?: No    Medication refill request verified as identical to current order?: Yes  Result of Last DAM, VPA, Li+ Level, CBC, or Carbamazepine Level (at or since last visit): N/A    Last visit treatment plan:   7/15/2022:  Patient overall has been doing really well.  No medication changes.  Tolerating meds well.  Was in a little bit of a hurry due to a surprise credentialing visit at her  today so we will discuss possible CCPS graduation at her next visit due to ongoing stability, not today.  Refills provided and she will be seen back in 2 months.  No acute safety concerns.  No SI.  No problematic drug or alcohol use.     Medication side effects and alternatives were reviewed. Health promotion activities recommended and reviewed today. All questions addressed. Education and counseling completed regarding risks and benefits of medications and psychotherapy options. Recommend therapy for additional support.      Treatment Plan:    Continue buspirone 20 mg twice daily for anxiety.    Continue hydroxyzine 25-50 mg at bedtime as needed for sleep    Continue Lamotrigine 100 mg daily for mood and to see if side effects improve.       Continue Wellbutrin  mg twice daily for mood.    Continue all other cares per primary care provider.     Continue all other medications as reviewed per electronic medical record today.     Safety plan reviewed. To the Emergency Department as needed or call after hours crisis line at [phone] or [phone]. [place] Crisis Text Line. Text [place] to 917919 or Suicide LifeLine Chat: suicidepreventionlifeline.org/chat    Continue  therapy as planned.     Schedule an appointment with me in 2 months, or sooner, as needed    []Medication refilled per  Medication Refill in Ambulatory Care  policy.  [x]Medication unable to be refilled by RN due to criteria not met as indicated below:    []Eligibility - not seen in the last year   [x]Supervision - no future appointment   []Compliance - no shows, cancellations or lapse in therapy   []Verification - order discrepancy   []Controlled medication   []Medication not included in policy   []90-day supply request   []Other

## 2022-10-17 NOTE — TELEPHONE ENCOUNTER
Additional request for Buspirone received via fax.     Medication requested: busPIRone (BUSPAR) 10 MG tablet Date last ordered: 7/15/2022 Qty: 360 Refills: 0    Akua Quan RN on 10/17/2022 at 4:04 PM

## 2022-10-20 DIAGNOSIS — F31.81 BIPOLAR II DISORDER (H): ICD-10-CM

## 2022-10-20 RX ORDER — LAMOTRIGINE 100 MG/1
100 TABLET ORAL DAILY
Qty: 30 TABLET | Refills: 0 | Status: SHIPPED | OUTPATIENT
Start: 2022-10-20 | End: 2022-11-16

## 2022-10-20 NOTE — CONFIDENTIAL NOTE
Date of Last Office Visit: 7/15/2022  Date of Next Office Visit: none scheduled. Will route to intake  No shows since last visit: 0  Cancellations since last visit: 0     Medication requested:lamotrigine 100mg Date last ordered: 7/15/2022 Qty: 90 Refills: 0     [place] of [place] ?: N/A     Lapse in medication adherence greater than 5 days?: No     Medication refill request verified as identical to current order?: Yes  Result of Last DAM, VPA, Li+ Level, CBC, or Carbamazepine Level (at or since last visit): N/A     Last visit treatment plan:   7/15/2022:  Patient overall has been doing really well.  No medication changes.  Tolerating meds well.  Was in a little bit of a hurry due to a surprise credentialing visit at her  today so we will discuss possible CCPS graduation at her next visit due to ongoing stability, not today.  Refills provided and she will be seen back in 2 months.  No acute safety concerns.  No SI.  No problematic drug or alcohol use.     Medication side effects and alternatives were reviewed. Health promotion activities recommended and reviewed today. All questions addressed. Education and counseling completed regarding risks and benefits of medications and psychotherapy options. Recommend therapy for additional support.      Treatment Plan:    Continue buspirone 20 mg twice daily for anxiety.    Continue hydroxyzine 25-50 mg at bedtime as needed for sleep    Continue Lamotrigine 100 mg daily for mood and to see if side effects improve.       Continue Wellbutrin  mg twice daily for mood.    Continue all other cares per primary care provider.     Continue all other medications as reviewed per electronic medical record today.     Safety plan reviewed. To the Emergency Department as needed or call after hours crisis line at [phone] or [phone]. [place] Crisis Text Line. Text [place] to 565656 or Suicide LifeLine Chat: suicidepreventionHyphen 8line.org/chat    Continue therapy as planned.      Schedule an appointment with me in 2 months, or sooner, as needed     []?Medication refilled per  Medication Refill in Ambulatory Care  policy.  [x]?Medication unable to be refilled by RN due to criteria not met as indicated below:               []?Eligibility - not seen in the last year              [x]?Supervision - no future appointment              []?Compliance - no shows, cancellations or lapse in therapy              []?Verification - order discrepancy              []?Controlled medication              [x]?Medication not included in policy              []?90-day supply request              []?Other

## 2022-10-23 ENCOUNTER — HEALTH MAINTENANCE LETTER (OUTPATIENT)
Age: 27
End: 2022-10-23

## 2023-01-06 DIAGNOSIS — F31.81 BIPOLAR II DISORDER (H): ICD-10-CM

## 2023-01-25 ENCOUNTER — VIRTUAL VISIT (OUTPATIENT)
Dept: PSYCHIATRY | Facility: CLINIC | Age: 28
End: 2023-01-25
Payer: COMMERCIAL

## 2023-01-25 ENCOUNTER — VIRTUAL VISIT (OUTPATIENT)
Dept: BEHAVIORAL HEALTH | Facility: CLINIC | Age: 28
End: 2023-01-25

## 2023-01-25 DIAGNOSIS — F31.81 BIPOLAR 2 DISORDER (H): Primary | ICD-10-CM

## 2023-01-25 DIAGNOSIS — F41.1 GAD (GENERALIZED ANXIETY DISORDER): ICD-10-CM

## 2023-01-25 DIAGNOSIS — F31.81 BIPOLAR II DISORDER (H): ICD-10-CM

## 2023-01-25 DIAGNOSIS — F43.10 PTSD (POST-TRAUMATIC STRESS DISORDER): ICD-10-CM

## 2023-01-25 PROCEDURE — 90832 PSYTX W PT 30 MINUTES: CPT | Mod: 95 | Performed by: PSYCHOLOGIST

## 2023-01-25 PROCEDURE — 99214 OFFICE O/P EST MOD 30 MIN: CPT | Mod: 95 | Performed by: PSYCHIATRY & NEUROLOGY

## 2023-01-25 RX ORDER — LAMOTRIGINE 100 MG/1
100 TABLET ORAL DAILY
Qty: 90 TABLET | Refills: 0 | Status: SHIPPED | OUTPATIENT
Start: 2023-01-25 | End: 2023-04-21

## 2023-01-25 RX ORDER — BUSPIRONE HYDROCHLORIDE 10 MG/1
20 TABLET ORAL 2 TIMES DAILY
Qty: 360 TABLET | Refills: 0 | Status: SHIPPED | OUTPATIENT
Start: 2023-01-25 | End: 2023-05-18

## 2023-01-25 RX ORDER — HYDROXYZINE PAMOATE 25 MG/1
25-50 CAPSULE ORAL
Qty: 60 CAPSULE | Refills: 1 | Status: SHIPPED | OUTPATIENT
Start: 2023-01-25 | End: 2023-05-22

## 2023-01-25 RX ORDER — BUPROPION HYDROCHLORIDE 100 MG/1
100 TABLET, EXTENDED RELEASE ORAL 2 TIMES DAILY
Qty: 180 TABLET | Refills: 0 | Status: SHIPPED | OUTPATIENT
Start: 2023-01-25 | End: 2023-05-22

## 2023-01-25 ASSESSMENT — PATIENT HEALTH QUESTIONNAIRE - PHQ9
SUM OF ALL RESPONSES TO PHQ QUESTIONS 1-9: 7
SUM OF ALL RESPONSES TO PHQ QUESTIONS 1-9: 7
10. IF YOU CHECKED OFF ANY PROBLEMS, HOW DIFFICULT HAVE THESE PROBLEMS MADE IT FOR YOU TO DO YOUR WORK, TAKE CARE OF THINGS AT HOME, OR GET ALONG WITH OTHER PEOPLE: SOMEWHAT DIFFICULT

## 2023-01-25 NOTE — PATIENT INSTRUCTIONS
Treatment Plan:  Continue buspirone 20 mg twice daily for anxiety.  Continue hydroxyzine 25-50 mg at bedtime as needed for sleep  Continue Lamotrigine 100 mg daily for mood.  No serum blood levels needed for lamotrigine since there is no correlation between mood improvement and serum blood level.  Continue Wellbutrin  mg twice daily for mood.  Your psychiatric care and medication management is being returned back to your primary care provider.  Please reach out to your primary care provider with any questions or concerns about your mental health and mental health medications.  Continue all other cares per primary care provider.   Continue all other medications as reviewed per electronic medical record today.   Safety plan reviewed. To the Emergency Department as needed or call after hours crisis line at [phone] or [phone]. [place] Crisis Text Line. Text [place] to 591652 or Suicide LifeLine Chat: suicidepreventionlifeline.org/chat  Continue therapy as planned.   Follow up with primary care provider as planned or for acute medical concerns.  [employer] may be used to communicate with your provider, but this is not intended to be used for emergencies.    Thank you for our work together in the Psychiatry Collaborative Care Model at [facility]. This is our last visit and I am returning your care back to your Primary Care Provider Jessica Crews MD . If you are not doing well, please contact your Primary Care Provider office.     Have previously discussed Lamictal (lamotrigine) can cause serious rashes including Mike-Riaz syndrome which may requiring hospitalization and discontinuation of treatment. If any signs of a rash occur, please see your Primary Care Provider or a dermatologist immediately.

## 2023-01-25 NOTE — PROGRESS NOTES
"Telemedicine Visit: The patient's condition can be safely assessed and treated via synchronous audio and visual telemedicine encounter.      Reason for Telemedicine Visit: Patient has requested telehealth visit    Originating Site (Patient Location): Patient's home    Distant Location (provider location):  Off-site    Consent:  The patient/guardian has verbally consented to: the potential risks and benefits of telemedicine (video visit) versus in person care; bill my insurance or make self-payment for services provided; and responsibility for payment of non-covered services.     Mode of Communication:  Video Conference via Message Systems    As the provider I attest to compliance with applicable laws and regulations related to telemedicine.        Outpatient Psychiatric Progress Note    Name: Pooja High   : 1995                    Primary Care Provider: [facility]   Therapist: Cash Mckay, THERESE    PHQ-9 scores:  PHQ-9 SCORE 5/10/2022 7/15/2022 2023   PHQ-9 Total Score MyChart 11 (Moderate depression) 10 (Moderate depression) 7 (Mild depression)   PHQ-9 Total Score 11 10 7       ALICE-7 scores:  ALICE-7 SCORE 2022 5/10/2022 7/15/2022   Total Score 14 (moderate anxiety) - 13 (moderate anxiety)   Total Score 14 9 13       Patient Identification:  Patient is a 27 year old,   White Choose not to answer female  who presents for return visit with me.  Patient is currently employed full time. Patient attended the phone/video session alone. Patient prefers to be called: \"Pooja\".    Interim History:  I last saw Poojasandor High for outpatient psychiatry return visit on 7/15/2022. During that appointment, we:     Continue buspirone 20 mg twice daily for anxiety.    Continue hydroxyzine 25-50 mg at bedtime as needed for sleep    Continue Lamotrigine 100 mg daily for mood and to see if side effects improve.       Continue Wellbutrin  mg twice daily for mood.    Continue all " other cares per primary care provider.     1/25: Patient overall doing well.  Changed jobs and had a lapse in insurance so has not been seen since July.  Patient likes her current job.  Doing well there and feels appreciated.  Tolerating medications well.  No new side effects.  Feels mood and anxiety stable and manageable.  No acute safety concerns.  No SI.  No problematic drug or alcohol use.    Per [facility], Dr. John Corona, during today's team-based visit:  Reported that there have been some significant changes for her. She quit her job, has a new job ([employer]) with better insurance, and is doing really well. She completed DBT and found it helpful. Now that her insurance situation is resolved, she is able to attend regular appointments. Her sleep has improved. She has some problems falling to sleep still but sleeps more restoratively. She is satisfied with her current medication regimen. She requested assistance in finding a regular therapist.     Psychiatric ROS:  Pooja High reports mood has been: Ongoing stability  Anxiety has been: Ongoing stability, much more manageable  Sleep has been: Stable  Radha sxs: None  Psychosis sxs: None  ADHD/ADD sxs: N/A  PTSD sxs: None  PHQ9 and GAD7 scores were reviewed today if completed.   Medication side effects: Denies  Current stressors include: Symptoms and See HPI above  Coping mechanisms and supports include: Therapy, Family, Hobbies and Friends, recent DBT    Current medications include:   Current Outpatient Medications   Medication Sig     buPROPion (WELLBUTRIN SR) 100 MG 12 hr tablet Take 1 tablet (100 mg) by mouth 2 times daily     busPIRone (BUSPAR) 10 MG tablet Take 2 tablets (20 mg) by mouth 2 times daily Need appt for refill.     Cholecalciferol (VITAMIN D-3 PO)      Fexofenadine HCl (ALLERGY 24-HR PO)      hydrOXYzine (VISTARIL) 25 MG capsule Take 1-2 capsules (25-50 mg) by mouth nightly as needed (sleep)     lamoTRIgine (LAMICTAL) 100 MG tablet Take 1  tablet (100 mg) by mouth daily Bridge to appt 1/25.     levonorgestrel (MIRENA) 20 MCG/24HR IUD 1 Device by Intrauterine route     Meclizine HCl (ANTIVERT PO)      multivitamin w/minerals (MULTI-VITAMIN) tablet Take 1 tablet by mouth daily     No current facility-administered medications for this visit.       Past Medical/Surgical History:  Past Medical History:   Diagnosis Date     Anxiety 10/17/2019     Depressive disorder      ALICE (generalized anxiety disorder) 9/22/2020     Moderate episode of recurrent major depressive disorder (H) 9/22/2020     Panic disorder       has a past medical history of Anxiety (10/17/2019), Depressive disorder, ALICE (generalized anxiety disorder) (9/22/2020), Moderate episode of recurrent major depressive disorder (H) (9/22/2020), and Panic disorder.    Social History:  Reviewed. No changes to social history except as noted above in HPI.    Vital Signs:   None. This is phone/video visit.     Labs:  Most recent laboratory results reviewed and no new labs.     Review of Systems:  10 systems (general, cardiovascular, respiratory, eyes, ENT, endocrine, GI, , M/S, neurological) were reviewed. Most pertinent finding(s) is/are: denies fever, cough, headaches, shortness of breath, chest pain, N/V, constipation/diarrhea, trouble urinating, aches and pains. The remaining systems are all unremarkable.    Mental Status Examination (limited as this is by phone/video):  Attitude:  cooperative, pleasant  Oriented to:  person, place, time, and situation  Attention Span and Concentration:  normal  Speech:  clear, coherent, regular rate, rhythm, and volume  Language: intact  Mood: Really good  Affect:  appropriate and in normal range, mood congruent and Bright  Associations:  no loose associations  Thought Process:  logical, linear and goal oriented  Thought Content:  no evidence of suicidal ideation or homicidal ideation, no evidence of psychotic thought, no auditory hallucinations present and no  visual hallucinations present  Recent and Remote Memory:  Intact to interview. Not formally assessed. No amnesia.  Fund of Knowledge: appropriate  Insight:  good  Judgment:  intact, adequate for safety  Impulse Control:  intact    Suicide Risk Assessment:  Today Pooja High reports no suicidal ideation. Based on all available evidence including the factors cited above, Pooja High does not appear to be at imminent risk for self-harm, does not meet criteria for a 72-hr hold, and therefore remains appropriate for ongoing outpatient level of care.  A thorough assessment of risk factors related to suicide and self-harm have been reviewed and are noted above. The patient convincingly denies suicidality on several occasions. Local community safety resources reviewed for patient to use if needed. There was no deceit detected, and the patient presented in a manner that was believable.     DSM5 Diagnosis:  300.02 (F41.1) Generalized Anxiety Disorder   Bipolar II Disorder, in remission  PTSD  Insomnia, unspecified    Medical comorbidities include:   Patient Active Problem List    Diagnosis Date Noted     Bipolar 2 disorder (H) 10/20/2021     Priority: Medium     PTSD (post-traumatic stress disorder) 10/20/2021     Priority: Medium     Moderate episode of recurrent major depressive disorder (H) 09/22/2020     Priority: Medium     ALICE (generalized anxiety disorder) 09/22/2020     Priority: Medium     Panic disorder without agoraphobia 09/22/2020     Priority: Medium     Anxiety 10/17/2019     Priority: Medium     ACP (advance care planning) 09/22/2020     Priority: Low     Health Care Home 09/22/2020     Priority: Low       Psychosocial & Contextual Factors: see HPI above    Assessment:  5/11/21  Pooja High reports overall some good improvement in her symptoms on Wellbutrin SR and with continued individual therapy.  She takes her Wellbutrin dose pretty early in the morning and so I suggest adding the afternoon dose to  prevent the crash she is experiencing.  Increased fatigue and sedation midday likely due to the first Wellbutrin SR dose wearing off.  I also recommend decreasing her citalopram since we will continue to optimize Wellbutrin SR.  She is agreeable to this plan.  I am hoping she might start sleeping a little better after second dose of Wellbutrin wears off.  She will continue to monitor.  She is encouraged to continue with ADHD testing in July.  She may also be starting with a DBT specialist.  She continues to also be open to the idea of ACT therapy. Has Mirena IUD to prevent pregnancy.      7/16/2021:   Patient overall doing quite well.  Found second dose of Wellbutrin SR to be quite helpful.  No decompensation with decreased Celexa dose and so we will continue to taper.  Can always further optimize Wellbutrin SR.  Also has ADHD testing coming up soon.  If affirmative for ADHD, might end up replacing Wellbutrin SR with stimulant medication.  Ran out of BuSpar and did notice slight dip/worsening of symptoms.  Refills sent today for buspirone.  She is encouraged to continue therapy and will continue to look for a good fit.    10/20/2021:  Patient with recent psychological testing.  Diagnosed with PTSD and bipolar 2 disorder.  Seems like it fits per the patient.  Could also see how these are accurate diagnoses.  Discussed treatment with lamotrigine which could be a good mood stabilizer in her case.  She seems to suffer more from depressive episodes and hypomanic/manic episodes.  Discussed risks and benefits of treatment.  Encouraged to continue to engage in individual therapy, particularly for the trauma component.    1/14/2022:   Patient overall doing a lot better since starting lamotrigine.  Possibly some mild side effects so we will decrease the dose little to 75 mg daily.  No other medication changes today.  We will get hydroxyzine refilled so she is sleeping better.  Anxiety relatively unchanged and so we will  increase buspirone to 20 mg twice daily to see if that is more helpful.  Overall doing quite well.  No safety concerns.  No SI.  No problematic drug or alcohol use.    5/6/2022:   Patient overall has been doing relatively well.  Has noticed how much lamotrigine can be helpful for her symptoms and has requested to try 100 mg daily.  She still reports a little bit of mood instability and so it is reasonable to increase to 100 mg daily.  She will watch for negative side effects.  Completes ADHD testing next week.  She will be sure to send us the report and will call to schedule an appointment after she gets the results.  If affirmative for ADHD, could consider replacing Wellbutrin SR with a stimulant medication or Strattera.  No acute safety concerns.  No SI.  No problematic drug or alcohol use.    7/15/2022:  Patient overall has been doing really well.  No medication changes.  Tolerating meds well.  Was in a little bit of a hurry due to a surprise credentialing visit at her  today so we will discuss possible [employer] graduation at her next visit due to ongoing stability, not today.  Refills provided and she will be seen back in 2 months.  No acute safety concerns.  No SI.  No problematic drug or alcohol use.    1/25/2023:  Patient overall doing very well.  Has remained stable on current medication regimen for many months.  No acute safety concerns.  No SI.  No problematic drug or alcohol use.  Completed a full DBT program.  Will be looking for a new therapist for ongoing support.  No medication side effects.  At this time, patient's psychiatric care will be returned back to primary care provider discharged from collaborative care psychiatric services.    Medication side effects and alternatives were reviewed. Health promotion activities recommended and reviewed today. All questions addressed. Education and counseling completed regarding risks and benefits of medications and psychotherapy options. Recommend therapy  for additional support.     Treatment Plan:    Continue buspirone 20 mg twice daily for anxiety.    Continue hydroxyzine 25-50 mg at bedtime as needed for sleep    Continue Lamotrigine 100 mg daily for mood.  No serum blood levels needed for lamotrigine since there is no correlation between mood improvement and serum blood level.    Continue Wellbutrin  mg twice daily for mood.    Your psychiatric care and medication management is being returned back to your primary care provider.  Please reach out to your primary care provider with any questions or concerns about your mental health and mental health medications.    Continue all other cares per primary care provider.     Continue all other medications as reviewed per electronic medical record today.     Safety plan reviewed. To the Emergency Department as needed or call after hours crisis line at [phone] or [phone]. [place] Crisis Text Line. Text [place] to 084286 or Suicide LifeLine Chat: suicidepreventionlifeline.org/chat    Continue therapy as planned.     Follow up with primary care provider as planned or for acute medical concerns.    [employer] may be used to communicate with your provider, but this is not intended to be used for emergencies.    Thank you for our work together in the Psychiatry Collaborative Care Model at [facility]. This is our last visit and I am returning your care back to your Primary Care Provider Jessica Crews MD . If you are not doing well, please contact your Primary Care Provider office.     Have previously discussed Lamictal (lamotrigine) can cause serious rashes including Mike-Riaz syndrome which may requiring hospitalization and discontinuation of treatment. If any signs of a rash occur, please see your Primary Care Provider or a dermatologist immediately.     Administrative Billing:   Phone Call/Video Duration: 13 Minutes  Start: 8:05a  Stop: 8:18a    Patient Status:  The patient is being returned to  the referring provider for ongoing care and medication prescribing.     Signed:   Magaly Frederick DO  [facility]    Disclaimer: This note consists of symbols derived from keyboarding, dictation and/or voice recognition software. As a result, there may be errors in the script that have gone undetected. Please consider this when interpreting information found in this chart.

## 2023-01-25 NOTE — Clinical Note
Psychiatry Update/Consult. I am returning Pooja High's psychiatric care back to you for ongoing management and medication prescribing.  Patient has graduated from [facility] due to ongoing stability and readiness to return to primary care provider. Future medication refills will come from you (I gave 3 months today). I recommended that the patient follow up with you in about 2-3 months for a mental health visit. More details about treatment plan are in my note. If you have any questions or concerns, please don't hesitate to reach out.   Thanks for the referral! It was a pleasure working with Pooja High.   Magaly Frederick, DO [facility]

## 2023-01-25 NOTE — PROGRESS NOTES
[facility]  January 25, 2023      Behavioral Health Clinician Progress Note    Patient Name: Pooja High          Service Type:  Individual      Service Location:   [facility]     Session Start Time: 07:30 am  Session End Time: 07:55 am      Session Length: 16 - 37      Attendees: Patient     Service Modality:  Video Visit:      Provider verified identity through the following two step process.  Patient provided:  Patient is known previously to provider    Telemedicine Visit: The patient's condition can be safely assessed and treated via synchronous audio and visual telemedicine encounter.      Reason for Telemedicine Visit: Services only offered telehealth    Originating Site (Patient Location): Patient's home    Distant Site (Provider Location): Provider Remote Setting- Home Office    Consent:  The patient/guardian has verbally consented to: the potential risks and benefits of telemedicine (video visit) versus in person care; bill my insurance or make self-payment for services provided; and responsibility for payment of non-covered services.     Patient would like the video invitation sent by:  My Chart    Mode of Communication:  Video Conference via [facility]    As the provider I attest to compliance with applicable laws and regulations related to telemedicine.    Visit Activities (Refresh list every visit): [facility] [place] Only    Diagnostic Assessment Date: 04/12/2021  Treatment Plan Review Date: 04/25/2023  See Flowsheets for today's PHQ-9 and ALICE-7 results  Previous PHQ-9:   PHQ-9 SCORE 5/10/2022 7/15/2022 1/25/2023   PHQ-9 Total Score MyChart 11 (Moderate depression) 10 (Moderate depression) 7 (Mild depression)   PHQ-9 Total Score 11 10 7     Previous ALICE-7:   ALICE-7 SCORE 1/14/2022 5/10/2022 7/15/2022   Total Score 14 (moderate anxiety) - 13 (moderate anxiety)   Total Score 14 9 13       DATA  Extended Session (60+ minutes): No  Interactive Complexity: No  Crisis:  No  [facility] Patient: No    Treatment Objective(s) Addressed in This Session:  Target Behavior(s): depression, anxiety, PTSD symptom management    Depressed Mood: Increase interest, engagement, and pleasure in doing things  Anxiety: will develop more effective coping skills to manage anxiety symptoms  PTSD Symptom Management    Current Stressors / Issues:  Reported that there have been some significant changes for her. She quit her job, has a new job ([employer]) with better insurance, and is doing really well. She completed DBT and found it helpful. Now that her insurance situation is resolved, she is able to attend regular appointments. Her sleep has improved. She has some problems falling to sleep still but sleeps more restoratively. She is satisfied with her current medication regimen. She requested assistance in finding a regular therapist.       07/15/2022:  Reported that she is doing very well. She has felt much calmer and better able to manage circumstances. Her mood is more positive and she is very pleased with her progress. She continues to work in DBT and stated the group is about 75% completed. She has found it very helpful. She received the results of her ADHD testing which did not confirm a diagnosis. She expressed having mixed feelings about the result because she does experience some memory types of issues. Reviewed the test findings with her indicating her memory concerns are better accounted for by distraction from mood symptoms. She indicated she had a better understanding now after that was explained to her in more detail. Overall, she feels her medications are working well and primarily needs refills from Dr. Frederick today.      05/06/2022:  Pooja reported she is doing well. She feels more settled at work, doing DBT group/individual every Tuesday and enjoying it. Increasing the Buspar has helped her anxiety. She noted that the combination of medication and DBT skills she is much better able to  manage her anxiety but is continuing to notice difficulty focusing particularly at work. She described having many projects started but unable to focus and complete tasks. It happens some at home as well but to a lesser degree. She has an appointment for the final part of her ADHD testing next week and is looking forward to getting the results. Regarding self-harm behaviors, she noted that she became upset a few weeks and was pulling her hair. She noticed what she was doing and used her DBT skills to stop. She processed this with her therapist. No other instance of self-harm. No SI in the last 4 months.      01/14/2022:  Reported that she is doing well. She got a new job as an  at a  center. She continues to do DBT group/individual. She really enjoys the program and appreciates. Lamotrigine has been really helpful for her depressed mood. She has been out of hydroxyzine and has not been sleeping well because of the anxiety keeping her up at night. She gave up caffeine but that did not make a change. She reported that she has lost her appetite and has also had irregular bowel movements. She has also been happening more frequent headaches. She is unsure if these issues are related to lamotrigine or stress.       Progress on Treatment Objective(s) / Homework:  Satisfactory progress - ACTION (Actively working towards change); Intervened by reinforcing change plan / affirming steps taken    Also provided psychoeducation about behavioral health condition, symptoms, and treatment options    Care Plan review completed: Yes    Medication Review:  Changes to psychiatric medications, see updated Medication List in EPIC.     Medication Compliance:  Yes    Changes in Health Issues:   None reported    Chemical Use Review:   Substance Use: Chemical use reviewed, no active concerns identified      Tobacco Use: No current tobacco use.      Assessment: Current Emotional / Mental Status (status of  significant symptoms):  Risk status (Self / Other harm or suicidal ideation)  Patient has had a history of suicidal ideation: off and on; passive and self-injurious behavior: 2013  Patient denies current fears or concerns for personal safety.  Patient denies current or recent suicidal ideation or behaviors.  Patient denies current or recent homicidal ideation or behaviors.  Patient denies current or recent self injurious behavior or ideation.  Patient denies other safety concerns.  A safety and risk management plan has been developed including: Patient consented to co-developed safety plan.  A safety and risk management plan was completed.  Patient agreed to use safety plan should any safety concerns arise.  A copy was given to the patient.    Appearance:   Appropriate   Eye Contact:   Good   Psychomotor Behavior: Normal   Attitude:   Cooperative   Orientation:   All  Speech   Rate / Production: Normal    Volume:  Normal   Mood:    Normal  Affect:    Appropriate   Thought Content:  Clear   Thought Form:  Coherent  Logical   Insight:    Good     Diagnoses:  1. Bipolar 2 disorder (H)    2. ALICE (generalized anxiety disorder)    3. PTSD (post-traumatic stress disorder)        Collateral Reports Completed:  Communicated with: Dr. Frederick    Plan: (Homework, other):  Patient was given information about behavioral services and encouraged to schedule a follow up appointment with the clinic [facility]  She was also given information about mental health symptoms and treatment options .  CD Recommendations: No indications of CD issues.  John Corona PsyD, LP      ______________________________________________________________________    [facility]: Treatment Plan    Patient's Name: Pooja High  YOB: 1995    Date of Creation: May 6, 2022  Date Treatment Plan Last Reviewed/Revised: May 6, 2022    DSM5 Diagnoses: 296.89 Bipolar  II Disorder With anxious distress, 300.02 (F41.1) Generalized Anxiety Disorder or 309.81 (F43.10) Posttraumatic Stress Disorder (includes Posttraumatic Stress Disorder for Children 6 Years and Younger)  Without dissociative symptoms  Psychosocial / Contextual Factors: poorly controlled symptoms    PROMIS (reviewed every 90 days):   PROMIS 10-Global Health (only subscores and total score):   PROMIS-10 Scores Only 1/14/2022 5/6/2022 5/6/2022   Global Mental Health Score 7 10 10   Global Physical Health Score 13 13 13   PROMIS TOTAL - SUBSCORES 20 23 23       Referral / Collaboration:  Referral to another professional/service is not indicated at this time..    Anticipated number of session for this episode of care: 5-6  Anticipation frequency of session: As determined by Dr. Frederick  Anticipated Duration of each session: 16-37 minutes  Treatment plan will be reviewed in 90 days or when goals have been changed.     MeasurableTreatment Goal(s) related to diagnosis / functional impairment(s)  Goal 1: Patient will work with providers to manage symptoms    I will know I've met my goal when I can focus and get things done.      Objective #A (Patient Action)  Patient will attend all appointments, take medication as prescribed.  Status: New - Date: 05/06/2022     Intervention(s)  [facility] will Monitor and assist in overcoming barriers to treatment adherence    Objective #B  Patient will consider all recommendations offered.  Status: New - Date: 05/06/2022      Intervention(s)  [facility] will educate patient on treatment options, clarify concerns, work with pt to overcome any resistance to compliance.      Goal 2: Patient will replace self-harm thoughts/behaviors with self-care activities    I will know I've met my goal when I don't do any form of self-harm.      Objective #A (Patient Action)                          Status: New - Date: 05/06/2022  Patient will assist in creating safety plan.     Intervention(s)  [facility] will assist in creation  of safety plan and provide copy to patient.     Objective #B  Patient will report using safety plan as needed.                       Status: New - Date: 05/06/2022     Intervention(s)  [facility] will monitor safety, use of plan, adjust plan as needed, work through any identified barriers/resistance to following her plan.      Patient has reviewed and agreed to the above plan.      Gagandeep Corona PsyD  May 6, 2022

## 2023-03-18 ENCOUNTER — MYC REFILL (OUTPATIENT)
Dept: BEHAVIORAL HEALTH | Facility: CLINIC | Age: 28
End: 2023-03-18
Payer: COMMERCIAL

## 2023-03-18 DIAGNOSIS — F41.1 GAD (GENERALIZED ANXIETY DISORDER): ICD-10-CM

## 2023-03-18 DIAGNOSIS — F31.81 BIPOLAR II DISORDER (H): ICD-10-CM

## 2023-03-22 RX ORDER — BUSPIRONE HYDROCHLORIDE 10 MG/1
20 TABLET ORAL 2 TIMES DAILY
Qty: 360 TABLET | Refills: 0 | OUTPATIENT
Start: 2023-03-22

## 2023-03-22 RX ORDER — LAMOTRIGINE 100 MG/1
100 TABLET ORAL DAILY
Qty: 90 TABLET | Refills: 0 | OUTPATIENT
Start: 2023-03-22

## 2023-03-22 NOTE — TELEPHONE ENCOUNTER
RN received a refill request from [facility] for .  This refill request was faxed DENIED back to [facility] for the following reasons:    1. As per last treatment care from 1/25/23 care being return to PCP.  As per chart review, pt was provided with a 90-day supply for all psych medications at the last appt and should have enough supply until end of April.

## 2023-03-22 NOTE — TELEPHONE ENCOUNTER
As per last treatment care from 1/25/23 care being return to PCP.  As per chart review, pt was provided with a 90-day supply for all psych medications at the last appt and should have enough supply until end of April.        Instructions    Treatment Plan:          Your psychiatric care and medication management is being returned back to your primary care provider.  Please reach out to your primary care provider with any questions or concerns about your mental health and mental health medications.

## 2023-04-02 ENCOUNTER — OFFICE VISIT (OUTPATIENT)
Dept: FAMILY MEDICINE | Facility: CLINIC | Age: 28
End: 2023-04-02
Payer: COMMERCIAL

## 2023-04-02 VITALS
TEMPERATURE: 96.9 F | SYSTOLIC BLOOD PRESSURE: 120 MMHG | HEART RATE: 97 BPM | DIASTOLIC BLOOD PRESSURE: 78 MMHG | OXYGEN SATURATION: 99 %

## 2023-04-02 DIAGNOSIS — R39.9 UTI SYMPTOMS: Primary | ICD-10-CM

## 2023-04-02 DIAGNOSIS — R30.0 DYSURIA: ICD-10-CM

## 2023-04-02 LAB
ALBUMIN UR-MCNC: NEGATIVE MG/DL
APPEARANCE UR: ABNORMAL
BACTERIA #/AREA URNS HPF: ABNORMAL /HPF
BILIRUB UR QL STRIP: NEGATIVE
COLOR UR AUTO: YELLOW
GLUCOSE UR STRIP-MCNC: NEGATIVE MG/DL
HGB UR QL STRIP: ABNORMAL
KETONES UR STRIP-MCNC: NEGATIVE MG/DL
LEUKOCYTE ESTERASE UR QL STRIP: ABNORMAL
MUCOUS THREADS #/AREA URNS LPF: PRESENT /LPF
NITRATE UR QL: NEGATIVE
PH UR STRIP: 7 [PH] (ref 5–7)
RBC #/AREA URNS AUTO: ABNORMAL /HPF
SP GR UR STRIP: 1.01 (ref 1–1.03)
SQUAMOUS #/AREA URNS AUTO: ABNORMAL /LPF
UROBILINOGEN UR STRIP-ACNC: 0.2 E.U./DL
WBC #/AREA URNS AUTO: ABNORMAL /HPF

## 2023-04-02 PROCEDURE — 81001 URINALYSIS AUTO W/SCOPE: CPT

## 2023-04-02 PROCEDURE — 87086 URINE CULTURE/COLONY COUNT: CPT | Performed by: FAMILY MEDICINE

## 2023-04-02 PROCEDURE — 87186 SC STD MICRODIL/AGAR DIL: CPT | Performed by: FAMILY MEDICINE

## 2023-04-02 PROCEDURE — 99213 OFFICE O/P EST LOW 20 MIN: CPT

## 2023-04-02 RX ORDER — SULFAMETHOXAZOLE/TRIMETHOPRIM 800-160 MG
1 TABLET ORAL 2 TIMES DAILY
Qty: 6 TABLET | Refills: 0 | Status: SHIPPED | OUTPATIENT
Start: 2023-04-02 | End: 2023-04-05

## 2023-04-02 NOTE — PROGRESS NOTES
URGENT CARE VISIT:    ASSESSMENT AND PLAN:      ICD-10-CM    1. UTI symptoms  R39.9 UA reflex to Microscopic and Culture     Urine Microscopic Exam     Urine Culture     sulfamethoxazole-trimethoprim (BACTRIM DS) 800-160 MG tablet      2. Dysuria  R30.0 sulfamethoxazole-trimethoprim (BACTRIM DS) 800-160 MG tablet          Differential Diagnosis include but not limited to UTI, Dysuria, Pyelonephritis, Kidney Stone, etc. UA macro showing blood and moderate leukocyte.  Micro unable to be performed here and will need to be completed at another site.  With past history of pyelonephritis we will go ahead and treat patient with Bactrim twice daily for 3 days.  Discussed with patient if need of changing antibiotics from culture we will go ahead and do so and communicate with her via portal.  Reported measures outlined and discussed including increase hydration, Azo, and rest as needed.  I have asked her to go ahead and monitor back pain for worsening symptoms.    Red flag symptoms needing ED evaluation was discussed with patient.    Follow up with primary care provider with any problems, questions or concerns or if symptoms worsen or fail to improve. Patient verbalized understanding and is agreeable to plan. The patient was discharged ambulatory and in stable condition.    SUBJECTIVE:   Pooja High is a 27 year old female who  presents today for a possible UTI. Symptoms of dysuria, urinary odor, and bilateral lumbar back pain.  She reports having history of back pain prior and use of Naproxen this AM has helped with pain and no pain at this visit.  Hx of Pyelonephritis in 2020. Patient denies urgency, frequency, suprapubic pain and pressure, nausea, vomiting, fever and chills or vaginal symptoms.         PMH:   Past Medical History:   Diagnosis Date     Anxiety 10/17/2019     Depressive disorder      ALICE (generalized anxiety disorder) 9/22/2020     Moderate episode of recurrent major depressive disorder (H) 9/22/2020      Panic disorder      Allergies: Patient has no known allergies.   Medications:   Current Outpatient Medications   Medication Sig Dispense Refill     buPROPion (WELLBUTRIN SR) 100 MG 12 hr tablet Take 1 tablet (100 mg) by mouth 2 times daily 180 tablet 0     busPIRone (BUSPAR) 10 MG tablet Take 2 tablets (20 mg) by mouth 2 times daily Need appt for refill. 360 tablet 0     Cholecalciferol (VITAMIN D-3 PO)        Fexofenadine HCl (ALLERGY 24-HR PO)        hydrOXYzine (VISTARIL) 25 MG capsule Take 1-2 capsules (25-50 mg) by mouth nightly as needed (sleep) 60 capsule 1     lamoTRIgine (LAMICTAL) 100 MG tablet Take 1 tablet (100 mg) by mouth daily 90 tablet 0     levonorgestrel (MIRENA) 20 MCG/24HR IUD 1 Device by Intrauterine route       Meclizine HCl (ANTIVERT PO)        multivitamin w/minerals (MULTI-VITAMIN) tablet Take 1 tablet by mouth daily       sulfamethoxazole-trimethoprim (BACTRIM DS) 800-160 MG tablet Take 1 tablet by mouth 2 times daily for 3 days 6 tablet 0     Social History:   Social History     Tobacco Use     Smoking status: Never     Smokeless tobacco: Never   Vaping Use     Vaping status: Not on file   Substance Use Topics     Alcohol use: Yes       ROS:  Review of systems negative except as stated above.    OBJECTIVE:  /78   Pulse 97   Temp 96.9  F (36.1  C) (Tympanic)   SpO2 99%     GENERAL APPEARANCE: healthy, alert and no distress  EYES: EOMI,  PERRL, conjunctiva clear  HENT: ear canals and TM's normal.  Nose and mouth without ulcers, erythema or lesions  NECK: supple, nontender, no lymphadenopathy  RESP: lungs clear to auscultation - no rales, rhonchi or wheezes  CV: regular rates and rhythm, normal S1 S2, no murmur noted  ABDOMEN:  soft, nontender, no HSM or masses and bowel sounds normal  PSYCH: mentation appears normal and affect normal/bright  Back:  Negative CVA tenderness.  No focal point tenderness upon examination.    Labs:      Results for orders placed or performed in visit on  04/02/23 (from the past 24 hour(s))   UA reflex to Microscopic and Culture    Specimen: Urine, Clean Catch   Result Value Ref Range    Color Urine Yellow Colorless, Straw, Light Yellow, Yellow    Appearance Urine Turbid (A) Clear    Glucose Urine Negative Negative mg/dL    Bilirubin Urine Negative Negative    Ketones Urine Negative Negative mg/dL    Specific Gravity Urine 1.010 1.003 - 1.035    Blood Urine Moderate (A) Negative    pH Urine 7.0 5.0 - 7.0    Protein Albumin Urine Negative Negative mg/dL    Urobilinogen Urine 0.2 0.2, 1.0 E.U./dL    Nitrite Urine Negative Negative    Leukocyte Esterase Urine Moderate (A) Negative

## 2023-04-04 LAB — BACTERIA UR CULT: ABNORMAL

## 2023-04-06 RX ORDER — LAMOTRIGINE 100 MG/1
TABLET ORAL
Qty: 30 TABLET | Refills: 0 | OUTPATIENT
Start: 2023-04-06

## 2023-04-20 ENCOUNTER — TELEPHONE (OUTPATIENT)
Dept: PSYCHIATRY | Facility: CLINIC | Age: 28
End: 2023-04-20
Payer: COMMERCIAL

## 2023-04-20 DIAGNOSIS — F31.81 BIPOLAR II DISORDER (H): ICD-10-CM

## 2023-04-20 NOTE — TELEPHONE ENCOUNTER
Faxed lamotrigine 100mg refill request from [facility].     Per Dr. Frederick's last tx plan 1/25/23, medication management was returned to PCP:  Thank you for our work together in the Psychiatry Collaborative Care Model at [facility]. This is our last visit and I am returning your care back to your Primary Care Provider Jessica Crews MD . If you are not doing well, please contact your Primary Care Provider office.     Referring PCP was Jessica Crews at the [facility] clinic. Pharmacy informed. [email or web address] message sent informing patient.    Kisha Wallace RN on 4/20/2023 at 12:14 PM

## 2023-04-21 ENCOUNTER — MYC MEDICAL ADVICE (OUTPATIENT)
Dept: PSYCHIATRY | Facility: CLINIC | Age: 28
End: 2023-04-21
Payer: COMMERCIAL

## 2023-04-21 RX ORDER — LAMOTRIGINE 100 MG/1
100 TABLET ORAL DAILY
Qty: 90 TABLET | Refills: 0 | Status: SHIPPED | OUTPATIENT
Start: 2023-04-21 | End: 2023-05-22

## 2023-04-21 RX ORDER — LAMOTRIGINE 100 MG/1
100 TABLET ORAL DAILY
Qty: 90 TABLET | Refills: 0 | Status: SHIPPED | OUTPATIENT
Start: 2023-04-21 | End: 2023-04-21

## 2023-04-21 NOTE — TELEPHONE ENCOUNTER
Pt is scheduled in July for OV.     Routing refill request to provider for review/approval because:  Drug not on the [facility] refill protocol

## 2023-04-21 NOTE — TELEPHONE ENCOUNTER
Refill provided despite pt discharged from [facility] in Jan. Staff message sent to pcp offering to answer questions/concerns for pcp managing lamotrigine for this pt in the future and also provided education on managing lamotrigine.

## 2023-05-18 ENCOUNTER — TELEPHONE (OUTPATIENT)
Dept: PSYCHIATRY | Facility: CLINIC | Age: 28
End: 2023-05-18
Payer: COMMERCIAL

## 2023-05-18 DIAGNOSIS — F41.1 GAD (GENERALIZED ANXIETY DISORDER): ICD-10-CM

## 2023-05-18 RX ORDER — BUSPIRONE HYDROCHLORIDE 10 MG/1
20 TABLET ORAL 2 TIMES DAILY
Qty: 120 TABLET | Refills: 0 | Status: SHIPPED | OUTPATIENT
Start: 2023-05-18 | End: 2023-05-22

## 2023-05-18 NOTE — TELEPHONE ENCOUNTER
Date of Last Office Visit: 1/25/23  Date of Next Office Visit: 5/22/23  No shows since last visit: 0  Cancellations since last visit: 0    Medication requested: busPIRone (BUSPAR) 10 MG tablet Date last ordered: 1/25/23 Qty: 360 Refills: 0     Review of MN ?: NA    Lapse in medication adherence greater than 5 days?: No, prn  If yes, call patient and gather details: na  Medication refill request verified as identical to current order?: no 30 days  Result of Last DAM, VPA, Li+ Level, CBC, or Carbamazepine Level (at or since last visit): N/A      Last visit treatment plan: Continue buspirone 20 mg twice daily for anxiety.    Continue hydroxyzine 25-50 mg at bedtime as needed for sleep    Continue Lamotrigine 100 mg daily for mood.  No serum blood levels needed for lamotrigine since there is no correlation between mood improvement and serum blood level.    Continue Wellbutrin  mg twice daily for mood.  Your psychiatric care and medication management is being returned back to your primary care provider.  Please reach out to your primary care provider with any questions or concerns about your mental health and mental health medications.  I refilled your lamotrigine today. Sounds like your pcp is not comfortable managing this medication. I am going to have you get scheduled with me for a visit in a few months to follow-up and ensure there is no disruptions in getting your medications refilled!          [x]Medication refilled per  Medication Refill in Ambulatory Care  policy.  []Medication unable to be refilled by RN due to criteria not met as indicated below:    []Eligibility - not seen in the last year   []Supervision - no future appointment   []Compliance - no shows, cancellations or lapse in therapy   []Verification - order discrepancy   []Controlled medication   []Medication not included in policy   []90-day supply request   []Other

## 2023-05-18 NOTE — TELEPHONE ENCOUNTER
Reason for Call:  Medication or medication refill:    Do you use a [facility]?  NO  Name of the pharmacy and phone number for the current request:  [facility]  Phone number: [phone]     Name of the medication requested:   busPIRone (BUSPAR) 10 MG tablet    Other request: NA    Can we leave a detailed message on this number? Not Applicable    Phone number patient can be reached at: Cell number on file:    Telephone Information:   Mobile [phone]       Best Time: ASAP    Call taken on 5/18/2023 at 1:34 PM by Airam Stokes

## 2023-05-22 ENCOUNTER — VIRTUAL VISIT (OUTPATIENT)
Dept: BEHAVIORAL HEALTH | Facility: CLINIC | Age: 28
End: 2023-05-22

## 2023-05-22 ENCOUNTER — VIRTUAL VISIT (OUTPATIENT)
Dept: PSYCHIATRY | Facility: CLINIC | Age: 28
End: 2023-05-22

## 2023-05-22 DIAGNOSIS — F41.1 GAD (GENERALIZED ANXIETY DISORDER): ICD-10-CM

## 2023-05-22 DIAGNOSIS — F31.81 BIPOLAR II DISORDER (H): Primary | ICD-10-CM

## 2023-05-22 DIAGNOSIS — F43.10 PTSD (POST-TRAUMATIC STRESS DISORDER): ICD-10-CM

## 2023-05-22 DIAGNOSIS — F41.0 PANIC ATTACK: ICD-10-CM

## 2023-05-22 DIAGNOSIS — G47.00 INSOMNIA, UNSPECIFIED TYPE: ICD-10-CM

## 2023-05-22 PROCEDURE — 90832 PSYTX W PT 30 MINUTES: CPT | Mod: VID | Performed by: PSYCHOLOGIST

## 2023-05-22 PROCEDURE — 99214 OFFICE O/P EST MOD 30 MIN: CPT | Mod: VID | Performed by: PSYCHIATRY & NEUROLOGY

## 2023-05-22 RX ORDER — LAMOTRIGINE 100 MG/1
100 TABLET ORAL DAILY
Qty: 90 TABLET | Refills: 3 | Status: SHIPPED | OUTPATIENT
Start: 2023-05-22 | End: 2024-04-17

## 2023-05-22 RX ORDER — BUSPIRONE HYDROCHLORIDE 10 MG/1
20 TABLET ORAL 2 TIMES DAILY
Qty: 360 TABLET | Refills: 3 | Status: SHIPPED | OUTPATIENT
Start: 2023-05-22 | End: 2024-04-17

## 2023-05-22 RX ORDER — HYDROXYZINE PAMOATE 25 MG/1
25-50 CAPSULE ORAL
Qty: 180 CAPSULE | Refills: 3 | Status: SHIPPED | OUTPATIENT
Start: 2023-05-22 | End: 2024-04-17

## 2023-05-22 RX ORDER — BUPROPION HYDROCHLORIDE 100 MG/1
100 TABLET, EXTENDED RELEASE ORAL 2 TIMES DAILY
Qty: 180 TABLET | Refills: 3 | Status: SHIPPED | OUTPATIENT
Start: 2023-05-22 | End: 2024-04-17

## 2023-05-22 ASSESSMENT — ANXIETY QUESTIONNAIRES
GAD7 TOTAL SCORE: 10
2. NOT BEING ABLE TO STOP OR CONTROL WORRYING: MORE THAN HALF THE DAYS
7. FEELING AFRAID AS IF SOMETHING AWFUL MIGHT HAPPEN: MORE THAN HALF THE DAYS
8. IF YOU CHECKED OFF ANY PROBLEMS, HOW DIFFICULT HAVE THESE MADE IT FOR YOU TO DO YOUR WORK, TAKE CARE OF THINGS AT HOME, OR GET ALONG WITH OTHER PEOPLE?: SOMEWHAT DIFFICULT
7. FEELING AFRAID AS IF SOMETHING AWFUL MIGHT HAPPEN: MORE THAN HALF THE DAYS
IF YOU CHECKED OFF ANY PROBLEMS ON THIS QUESTIONNAIRE, HOW DIFFICULT HAVE THESE PROBLEMS MADE IT FOR YOU TO DO YOUR WORK, TAKE CARE OF THINGS AT HOME, OR GET ALONG WITH OTHER PEOPLE: SOMEWHAT DIFFICULT
4. TROUBLE RELAXING: SEVERAL DAYS
1. FEELING NERVOUS, ANXIOUS, OR ON EDGE: SEVERAL DAYS
5. BEING SO RESTLESS THAT IT IS HARD TO SIT STILL: SEVERAL DAYS
IF YOU CHECKED OFF ANY PROBLEMS ON THIS QUESTIONNAIRE, HOW DIFFICULT HAVE THESE PROBLEMS MADE IT FOR YOU TO DO YOUR WORK, TAKE CARE OF THINGS AT HOME, OR GET ALONG WITH OTHER PEOPLE: SOMEWHAT DIFFICULT
5. BEING SO RESTLESS THAT IT IS HARD TO SIT STILL: SEVERAL DAYS
GAD7 TOTAL SCORE: 10
2. NOT BEING ABLE TO STOP OR CONTROL WORRYING: MORE THAN HALF THE DAYS
7. FEELING AFRAID AS IF SOMETHING AWFUL MIGHT HAPPEN: MORE THAN HALF THE DAYS
6. BECOMING EASILY ANNOYED OR IRRITABLE: MORE THAN HALF THE DAYS
8. IF YOU CHECKED OFF ANY PROBLEMS, HOW DIFFICULT HAVE THESE MADE IT FOR YOU TO DO YOUR WORK, TAKE CARE OF THINGS AT HOME, OR GET ALONG WITH OTHER PEOPLE?: SOMEWHAT DIFFICULT
7. FEELING AFRAID AS IF SOMETHING AWFUL MIGHT HAPPEN: MORE THAN HALF THE DAYS
4. TROUBLE RELAXING: SEVERAL DAYS
GAD7 TOTAL SCORE: 10
3. WORRYING TOO MUCH ABOUT DIFFERENT THINGS: SEVERAL DAYS
GAD7 TOTAL SCORE: 10
6. BECOMING EASILY ANNOYED OR IRRITABLE: MORE THAN HALF THE DAYS
1. FEELING NERVOUS, ANXIOUS, OR ON EDGE: SEVERAL DAYS
3. WORRYING TOO MUCH ABOUT DIFFERENT THINGS: SEVERAL DAYS

## 2023-05-22 ASSESSMENT — PATIENT HEALTH QUESTIONNAIRE - PHQ9
10. IF YOU CHECKED OFF ANY PROBLEMS, HOW DIFFICULT HAVE THESE PROBLEMS MADE IT FOR YOU TO DO YOUR WORK, TAKE CARE OF THINGS AT HOME, OR GET ALONG WITH OTHER PEOPLE: SOMEWHAT DIFFICULT
SUM OF ALL RESPONSES TO PHQ QUESTIONS 1-9: 6
SUM OF ALL RESPONSES TO PHQ QUESTIONS 1-9: 6

## 2023-05-22 NOTE — PATIENT INSTRUCTIONS
Treatment Plan:  Continue buspirone 20 mg twice daily for anxiety.  Continue hydroxyzine 25-50 mg at bedtime as needed for sleep  Continue Lamotrigine 100 mg daily for mood.  No serum blood levels needed for lamotrigine since there is no correlation between mood improvement and serum blood level.  Continue Wellbutrin  mg twice daily for mood.  Continue all other cares per primary care provider.   Continue all other medications as reviewed per electronic medical record today.   Schedule an appointment with me in 12 months or sooner as needed. Scheduling should reach out to you in 9 months to schedule your 6 months follow-up visit. If you don't hear from someone, call [phone] to get scheduled.  Or, let us know if you have transitioned your mental health care to a new primary care provider after Dr. Crews retires.   Safety plan reviewed. To the Emergency Department as needed or call after hours crisis line at [phone] or [phone]. [place] Crisis Text Line. Text [place] to 378978 or Suicide LifeLine Chat: suicidepreventionlifeline.org/chat  Continue therapy as planned.   Follow up with primary care provider as planned or for acute medical concerns.  MyChart may be used to communicate with your provider, but this is not intended to be used for emergencies.    Have previously discussed Lamictal (lamotrigine) can cause serious rashes including Mike-Riaz syndrome which may requiring hospitalization and discontinuation of treatment. If any signs of a rash occur, please see your Primary Care Provider or a dermatologist immediately.     Patient Education   Collaborative Care Psychiatry Service  What to Expect  Here's what to expect from your Collaborative Care Psychiatry Service (CCPS).   About CCPS  CCPS means 2 people work together to help you get better. You'll meet with a behavioral health clinician and a psychiatric doctor. A behavioral health clinician helps people with mental health problems by  "talking with them. A psychiatric doctor helps people by giving them medicine.  How it works  At every visit, you'll see the behavioral health clinician (BHC) first. They'll talk with you about how you're doing and teach you how to feel better.   Then you'll see the psychiatric doctor. This doctor can help you deal with troubling thoughts and feelings by giving you medicine. They'll make sure you know the plan for your care.   CCPS usually takes 3 to 6 visits. If you need more visits, we may have you start seeing a different psychiatric doctor for ongoing care.  If you have any questions or concerns, we'll be glad to talk with you.  About visits  Be open  At your visits, please talk openly about your problems. It may feel hard, but it's the best way for us to help you.  Cancelling visits  If you can't come to your visit, please call us right away at [phone]. If you don't cancel at least 24 hours (1 full day) before your visit, that's \"late cancellation.\"  Being late to visits  Being very late is the same as not showing up. You will be a \"no show\" if:  Your appointment starts with a BHC, and you're more than 15 minutes late for a 30-minute (half hour) visit. This will also cancel your appointment with the psychiatric doctor.  Your appointment is with a psychiatric doctor only, and you're more than 15 minutes late for a 30-minute (half hour) visit.  Your appointment is with a psychiatric doctor only, and you're more than 30 minutes late for a 60-minute (full hour) visit.  If you cancel late or don't show up 2 times within 6 months, we may end your care.   Getting help between visits  If you need help between visits, you can call us Monday to Friday from 8 a.m. to 4:30 p.m. at [phone].  Emergency care  Call 911 or go to the nearest emergency department if your life or someone else's life is in danger.  Call 738 anytime to reach the national Suicide and Crisis hotline.  Medicine refills  To refill your " medicine, call your pharmacy. You can also call [facility]'s [facility] at [phone], Monday to Friday, 8 a.m. to 4:30 p.m. It can take 1 to 3 business days to get a refill.   Forms, letters, and tests  You may have papers to fill out, like FMLA, short-term disability, and workability. We can help you with these forms at your visits, but you must have an appointment. You may need more than 1 visit for this, to be in an intensive therapy program, or both.  Before we can give you medicine for ADHD, we may refer you to get tested for it or confirm it another way.  We may not be able to give you an emotional support animal letter.  We don't do mental health checks ordered by the court.   We don't do mental health testing, but we can refer you to get tested.   Thank you for choosing us for your care.  For informational purposes only. Not to replace the advice of your health care provider. Copyright   2022 [facility]. All rights reserved. [employer] [id number] - 12/22.

## 2023-05-22 NOTE — PROGRESS NOTES
"Telemedicine Visit: The patient's condition can be safely assessed and treated via synchronous audio and visual telemedicine encounter.      Reason for Telemedicine Visit: Patient has requested telehealth visit    Originating Site (Patient Location): Patient's home    Distant Location (provider location):  Off-site    Consent:  The patient/guardian has verbally consented to: the potential risks and benefits of telemedicine (video visit) versus in person care; bill my insurance or make self-payment for services provided; and responsibility for payment of non-covered services.     Mode of Communication:  Video Conference via [employer]    As the provider I attest to compliance with applicable laws and regulations related to telemedicine.        Outpatient Psychiatric Progress Note    Name: Pooja E High   : 1995                    Primary Care Provider: [facility]   Therapist: THERESE Lyons    PHQ-9 scores:      7/15/2022     7:16 AM 2023     7:23 AM 2023     7:29 AM   PHQ-9 SCORE   PHQ-9 Total Score MyChart 10 (Moderate depression) 7 (Mild depression) 6 (Mild depression)   PHQ-9 Total Score 10 7 6       ALICE-7 scores:      5/10/2022     7:00 AM 7/15/2022     7:20 AM 2023     7:30 AM   ALICE-7 SCORE   Total Score  13 (moderate anxiety) 10 (moderate anxiety)   Total Score 9 13 10    10       Patient Identification:  Patient is a 27 year old,   White Choose not to answer female  who presents for return visit with me.  Patient is currently employed full time. Patient attended the phone/video session alone. Patient prefers to be called: \"Pooja\".    Interim History:  I last saw Pooja High for outpatient psychiatry return visit on 2023. During that appointment, we:     Continue buspirone 20 mg twice daily for anxiety.    Continue hydroxyzine 25-50 mg at bedtime as needed for sleep    Continue Lamotrigine 100 mg daily for mood and to see if side effects " improve.       Continue Wellbutrin  mg twice daily for mood.    Continue all other cares per primary care provider.     5/22: Patient overall doing well.  Symptoms have remained stable.  Primary care provider refuses to fill lamotrigine and so patient returns back to the [facility] psychiatric services for lamotrigine refills.  Issue not escalated since patient will be establishing with a new primary care provider since current primary care provider retires in a few months.  Patient reports she prefers primary care provider to manage her psychiatric medications.  No acute safety concerns.  No SI.  Work is going well.  No side effects from medications.  No problematic drug or alcohol use.    Per [facility], Dr. John Corona, during today's team-based visit:  Pooja reported that she has been doing well over the last 4 months. She feels her medications continue to be beneficial for her. She has settled into her new job and really enjoys her work. She also moved to a new apartment and it is a much better place for her. She has a new therapist that she has been working with for a few months and it is going well. She has no specific concerns or requests for today other than needing a refill. She has a new provider appointment for July.     Psychiatric ROS:  Pooja High reports mood has been: Ongoing stability  Anxiety has been: Ongoing stability  Sleep has been: Stable, intermittent use of hydroxyzine  Radha sxs: None  Psychosis sxs: None  ADHD/ADD sxs: N/A  PTSD sxs: None  PHQ9 and GAD7 scores were reviewed today if completed.   Medication side effects: Denies  Current stressors include: Symptoms and See HPI above  Coping mechanisms and supports include: Therapy, Family, Hobbies and Friends    Current medications include:   Current Outpatient Medications   Medication Sig     buPROPion (WELLBUTRIN SR) 100 MG 12 hr tablet Take 1 tablet (100 mg) by mouth 2 times daily     busPIRone (BUSPAR) 10 MG tablet Take 2 tablets  (20 mg) by mouth 2 times daily Need appt for refill.     Cholecalciferol (VITAMIN D-3 PO)      Fexofenadine HCl (ALLERGY 24-HR PO)      hydrOXYzine (VISTARIL) 25 MG capsule Take 1-2 capsules (25-50 mg) by mouth nightly as needed (sleep)     lamoTRIgine (LAMICTAL) 100 MG tablet Take 1 tablet (100 mg) by mouth daily     levonorgestrel (MIRENA) 20 MCG/24HR IUD 1 Device by Intrauterine route     Meclizine HCl (ANTIVERT PO)      multivitamin w/minerals (MULTI-VITAMIN) tablet Take 1 tablet by mouth daily     No current facility-administered medications for this visit.       Past Medical/Surgical History:  Past Medical History:   Diagnosis Date     Anxiety 10/17/2019     Depressive disorder      ALICE (generalized anxiety disorder) 9/22/2020     Moderate episode of recurrent major depressive disorder (H) 9/22/2020     Panic disorder       has a past medical history of Anxiety (10/17/2019), Depressive disorder, ALICE (generalized anxiety disorder) (9/22/2020), Moderate episode of recurrent major depressive disorder (H) (9/22/2020), and Panic disorder.    Social History:  Reviewed. No changes to social history except as noted above in HPI.    Vital Signs:   None. This is phone/video visit.     Labs:  Most recent laboratory results reviewed and no new labs.     Review of Systems:  10 systems (general, cardiovascular, respiratory, eyes, ENT, endocrine, GI, , M/S, neurological) were reviewed. Most pertinent finding(s) is/are: denies fever, cough, headaches, shortness of breath, chest pain, N/V, constipation/diarrhea, trouble urinating, aches and pains. The remaining systems are all unremarkable.    Mental Status Examination (limited as this is by phone/video):  Attitude:  cooperative, pleasant  Oriented to:  person, place, time, and situation  Attention Span and Concentration:  normal  Speech:  clear, coherent, regular rate, rhythm, and volume  Language: intact  Mood: Good  Affect:  appropriate and in normal range, mood congruent  and Bright  Associations:  no loose associations  Thought Process:  logical, linear and goal oriented  Thought Content:  no evidence of suicidal ideation or homicidal ideation, no evidence of psychotic thought, no auditory hallucinations present and no visual hallucinations present  Recent and Remote Memory:  Intact to interview. Not formally assessed. No amnesia.  Fund of Knowledge: appropriate  Insight:  good  Judgment:  intact, adequate for safety  Impulse Control:  intact    Suicide Risk Assessment:  Today Pooja High reports no suicidal ideation. Based on all available evidence including the factors cited above, Pooja High does not appear to be at imminent risk for self-harm, does not meet criteria for a 72-hr hold, and therefore remains appropriate for ongoing outpatient level of care.  A thorough assessment of risk factors related to suicide and self-harm have been reviewed and are noted above. The patient convincingly denies suicidality on several occasions. Local community safety resources reviewed for patient to use if needed. There was no deceit detected, and the patient presented in a manner that was believable.     DSM5 Diagnosis:  300.02 (F41.1) Generalized Anxiety Disorder   Bipolar II Disorder, in remission  PTSD  Insomnia, unspecified    Medical comorbidities include:   Patient Active Problem List    Diagnosis Date Noted     Bipolar 2 disorder (H) 10/20/2021     Priority: Medium     PTSD (post-traumatic stress disorder) 10/20/2021     Priority: Medium     Moderate episode of recurrent major depressive disorder (H) 09/22/2020     Priority: Medium     ALICE (generalized anxiety disorder) 09/22/2020     Priority: Medium     Panic disorder without agoraphobia 09/22/2020     Priority: Medium     Anxiety 10/17/2019     Priority: Medium     ACP (advance care planning) 09/22/2020     Priority: Low     Health Care Home 09/22/2020     Priority: Low       Psychosocial & Contextual Factors: see HPI  above    Assessment:  5/11/21  Pooja High reports overall some good improvement in her symptoms on Wellbutrin SR and with continued individual therapy.  She takes her Wellbutrin dose pretty early in the morning and so I suggest adding the afternoon dose to prevent the crash she is experiencing.  Increased fatigue and sedation midday likely due to the first Wellbutrin SR dose wearing off.  I also recommend decreasing her citalopram since we will continue to optimize Wellbutrin SR.  She is agreeable to this plan.  I am hoping she might start sleeping a little better after second dose of Wellbutrin wears off.  She will continue to monitor.  She is encouraged to continue with ADHD testing in July.  She may also be starting with a DBT specialist.  She continues to also be open to the idea of ACT therapy. Has Mirena IUD to prevent pregnancy.      7/16/2021:   Patient overall doing quite well.  Found second dose of Wellbutrin SR to be quite helpful.  No decompensation with decreased Celexa dose and so we will continue to taper.  Can always further optimize Wellbutrin SR.  Also has ADHD testing coming up soon.  If affirmative for ADHD, might end up replacing Wellbutrin SR with stimulant medication.  Ran out of BuSpar and did notice slight dip/worsening of symptoms.  Refills sent today for buspirone.  She is encouraged to continue therapy and will continue to look for a good fit.    10/20/2021:  Patient with recent psychological testing.  Diagnosed with PTSD and bipolar 2 disorder.  Seems like it fits per the patient.  Could also see how these are accurate diagnoses.  Discussed treatment with lamotrigine which could be a good mood stabilizer in her case.  She seems to suffer more from depressive episodes and hypomanic/manic episodes.  Discussed risks and benefits of treatment.  Encouraged to continue to engage in individual therapy, particularly for the trauma component.    1/14/2022:   Patient overall doing a lot better  since starting lamotrigine.  Possibly some mild side effects so we will decrease the dose little to 75 mg daily.  No other medication changes today.  We will get hydroxyzine refilled so she is sleeping better.  Anxiety relatively unchanged and so we will increase buspirone to 20 mg twice daily to see if that is more helpful.  Overall doing quite well.  No safety concerns.  No SI.  No problematic drug or alcohol use.    5/6/2022:   Patient overall has been doing relatively well.  Has noticed how much lamotrigine can be helpful for her symptoms and has requested to try 100 mg daily.  She still reports a little bit of mood instability and so it is reasonable to increase to 100 mg daily.  She will watch for negative side effects.  Completes ADHD testing next week.  She will be sure to send us the report and will call to schedule an appointment after she gets the results.  If affirmative for ADHD, could consider replacing Wellbutrin SR with a stimulant medication or Strattera.  No acute safety concerns.  No SI.  No problematic drug or alcohol use.    7/15/2022:  Patient overall has been doing really well.  No medication changes.  Tolerating meds well.  Was in a little bit of a hurry due to a surprise credentialing visit at her  today so we will discuss possible CCPS graduation at her next visit due to ongoing stability, not today.  Refills provided and she will be seen back in 2 months.  No acute safety concerns.  No SI.  No problematic drug or alcohol use.    1/25/2023:  Patient overall doing very well.  Has remained stable on current medication regimen for many months.  No acute safety concerns.  No SI.  No problematic drug or alcohol use.  Completed a full DBT program.  Will be looking for a new therapist for ongoing support.  No medication side effects.  At this time, patient's psychiatric care will be returned back to primary care provider discharged from [facility] psychiatric  services.    5/22/2023:  Patient returns to collaborative care psychiatric services since primary care provider unwilling to prescribe lamotrigine.  Provided education and offered to address any questions or concerns regarding lamotrigine to primary care provider but since primary care provider retiring in November they declined to take over any prescribing.  Patient added to long-term care panel for the time being until she establishes with a new primary care provider to discuss ongoing management of mental health medications due to prolonged stability of mental health symptoms.  Patient stated today she would prefer to have medications managed by primary care provider.  Refills provided today.  Patient symptoms remain stable and she is tolerating medications well.  No acute safety concerns.  No SI.  No problematic drug or alcohol use.    Medication side effects and alternatives were reviewed. Health promotion activities recommended and reviewed today. All questions addressed. Education and counseling completed regarding risks and benefits of medications and psychotherapy options. Recommend therapy for additional support.     Treatment Plan:    Continue buspirone 20 mg twice daily for anxiety.    Continue hydroxyzine 25-50 mg at bedtime as needed for sleep    Continue Lamotrigine 100 mg daily for mood.  No serum blood levels needed for lamotrigine since there is no correlation between mood improvement and serum blood level.    Continue Wellbutrin  mg twice daily for mood.    Continue all other cares per primary care provider.     Continue all other medications as reviewed per electronic medical record today.     Schedule an appointment with me in 12 months or sooner as needed. Scheduling should reach out to you in 9 months to schedule your 6 months follow-up visit. If you don't hear from someone, call [phone] to get scheduled.  Or, let us know if you have transitioned your mental health care to a new  primary care provider after Dr. Crews retires.     Safety plan reviewed. To the Emergency Department as needed or call after hours crisis line at [phone] or [phone]. [place] Crisis Text Line. Text [place] to 536096 or Suicide LifeLine Chat: suicidepreventionlifeline.org/chat    Continue therapy as planned.     Follow up with primary care provider as planned or for acute medical concerns.    MyChart may be used to communicate with your provider, but this is not intended to be used for emergencies.    Have previously discussed Lamictal (lamotrigine) can cause serious rashes including Mike-Riaz syndrome which may requiring hospitalization and discontinuation of treatment. If any signs of a rash occur, please see your Primary Care Provider or a dermatologist immediately.     Administrative Billing:   Phone Call/Video Duration: 7 Minutes  Start: 8:05a  Stop: 8:12a    Patient Status:  Patient is a long-term/continous care patient.     Signed:   Magaly Frederick DO  [facility]    Disclaimer: This note consists of symbols derived from keyboarding, dictation and/or voice recognition software. As a result, there may be errors in the script that have gone undetected. Please consider this when interpreting information found in this chart.

## 2023-05-22 NOTE — PROGRESS NOTES
[facility]  May 22, 2023      Behavioral Health Clinician Progress Note    Patient Name: Pooja High          Service Type:  Individual      Service Location:   [facility]     Session Start Time: 07:30 am  Session End Time: 07:50 am      Session Length: 16 - 37      Attendees: Patient     Service Modality:  Video Visit:      Provider verified identity through the following two step process.  Patient provided:  Patient is known previously to provider    Telemedicine Visit: The patient's condition can be safely assessed and treated via synchronous audio and visual telemedicine encounter.      Reason for Telemedicine Visit: Services only offered telehealth    Originating Site (Patient Location): Patient's home    Distant Site (Provider Location): Provider Remote Setting- Home Office    Consent:  The patient/guardian has verbally consented to: the potential risks and benefits of telemedicine (video visit) versus in person care; bill my insurance or make self-payment for services provided; and responsibility for payment of non-covered services.     Patient would like the video invitation sent by:  My Chart    Mode of Communication:  Video Conference via [facility]    As the provider I attest to compliance with applicable laws and regulations related to telemedicine.    Visit Activities (Refresh list every visit): [facility] Only    Diagnostic Assessment Date: 04/12/2021  Treatment Plan Review Date: 04/25/2023  See Flowsheets for today's PHQ-9 and ALICE-7 results  Previous PHQ-9:       7/15/2022     7:16 AM 1/25/2023     7:23 AM 5/22/2023     7:29 AM   PHQ-9 SCORE   PHQ-9 Total Score MyChart 10 (Moderate depression) 7 (Mild depression) 6 (Mild depression)   PHQ-9 Total Score 10 7 6     Previous ALICE-7:       5/10/2022     7:00 AM 7/15/2022     7:20 AM 5/22/2023     7:30 AM   ALICE-7 SCORE   Total Score  13 (moderate anxiety) 10 (moderate anxiety)   Total Score 9 13 10    10        DATA  Extended Session (60+ minutes): No  Interactive Complexity: No  Crisis: No  [facility] Patient: No    Treatment Objective(s) Addressed in This Session:  Target Behavior(s): depression, anxiety, PTSD symptom management    Depressed Mood: Increase interest, engagement, and pleasure in doing things  Anxiety: will develop more effective coping skills to manage anxiety symptoms  PTSD Symptom Management    Current Stressors / Issues:  Pooja reported that she has been doing well over the last 4 months. She feels her medications continue to be beneficial for her. She has settled into her new job and really enjoys her work. She also moved to a new apartment and it is a much better place for her. She has a new therapist that she has been working with for a few months and it is going well. She has no specific concerns or requests for today other than needing a refill. She has a new provider appointment for July.       01/25/2023:  Reported that there have been some significant changes for her. She quit her job, has a new job ([email or web address] with better insurance, and is doing really well. She completed DBT and found it helpful. Now that her insurance situation is resolved, she is able to attend regular appointments. Her sleep has improved. She has some problems falling to sleep still but sleeps more restoratively. She is satisfied with her current medication regimen. She requested assistance in finding a regular therapist.       07/15/2022:  Reported that she is doing very well. She has felt much calmer and better able to manage circumstances. Her mood is more positive and she is very pleased with her progress. She continues to work in DBT and stated the group is about 75% completed. She has found it very helpful. She received the results of her ADHD testing which did not confirm a diagnosis. She expressed having mixed feelings about the result because she does experience some memory types of issues. Reviewed the  test findings with her indicating her memory concerns are better accounted for by distraction from mood symptoms. She indicated she had a better understanding now after that was explained to her in more detail. Overall, she feels her medications are working well and primarily needs refills from Dr. Frederick today.      05/06/2022:  Pooja reported she is doing well. She feels more settled at work, doing DBT group/individual every Tuesday and enjoying it. Increasing the Buspar has helped her anxiety. She noted that the combination of medication and DBT skills she is much better able to manage her anxiety but is continuing to notice difficulty focusing particularly at work. She described having many projects started but unable to focus and complete tasks. It happens some at home as well but to a lesser degree. She has an appointment for the final part of her ADHD testing next week and is looking forward to getting the results. Regarding self-harm behaviors, she noted that she became upset a few weeks and was pulling her hair. She noticed what she was doing and used her DBT skills to stop. She processed this with her therapist. No other instance of self-harm. No SI in the last 4 months.      01/14/2022:  Reported that she is doing well. She got a new job as an  at a  center. She continues to do DBT group/individual. She really enjoys the program and appreciates. Lamotrigine has been really helpful for her depressed mood. She has been out of hydroxyzine and has not been sleeping well because of the anxiety keeping her up at night. She gave up caffeine but that did not make a change. She reported that she has lost her appetite and has also had irregular bowel movements. She has also been happening more frequent headaches. She is unsure if these issues are related to lamotrigine or stress.       Progress on Treatment Objective(s) / Homework:  Satisfactory progress - ACTION (Actively working towards  change); Intervened by reinforcing change plan / affirming steps taken    Also provided psychoeducation about behavioral health condition, symptoms, and treatment options    Care Plan review completed: Yes    Medication Review:  Changes to psychiatric medications, see updated Medication List in EPIC.     Medication Compliance:  Yes    Changes in Health Issues:   None reported    Chemical Use Review:   Substance Use: Chemical use reviewed, no active concerns identified      Tobacco Use: No current tobacco use.      Assessment: Current Emotional / Mental Status (status of significant symptoms):  Risk status (Self / Other harm or suicidal ideation)  Patient has had a history of suicidal ideation: off and on; passive and self-injurious behavior: 2013  Patient denies current fears or concerns for personal safety.  Patient denies current or recent suicidal ideation or behaviors.  Patient denies current or recent homicidal ideation or behaviors.  Patient denies current or recent self injurious behavior or ideation.  Patient denies other safety concerns.  A safety and risk management plan has been developed including: Patient consented to co-developed safety plan.  A safety and risk management plan was completed.  Patient agreed to use safety plan should any safety concerns arise.  A copy was given to the patient.    Appearance:   Appropriate   Eye Contact:   Good   Psychomotor Behavior: Normal   Attitude:   Cooperative   Orientation:   All  Speech   Rate / Production: Normal    Volume:  Normal   Mood:    Normal  Affect:    Appropriate   Thought Content:  Clear   Thought Form:  Coherent  Logical   Insight:    Good     Diagnoses:  1. Bipolar II disorder (H)    2. ALICE (generalized anxiety disorder)    3. PTSD (post-traumatic stress disorder)    4. Panic attack        Collateral Reports Completed:  Communicated with: Dr. Frederick    Plan: (Homework, other):  Patient was given information about behavioral services and encouraged  to schedule a follow up appointment with the clinic [facility] in conjunction with [facility].  She was also given information about mental health symptoms and treatment options .  CD Recommendations: No indications of CD issues.  John Corona PsyD, LP      ______________________________________________________________________    [facility] - [place]: Treatment Plan    Patient's Name: Pooja High  YOB: 1995    Date of Creation: May 6, 2022  Date Treatment Plan Last Reviewed/Revised: May 22, 2023    DSM5 Diagnoses: 296.89 Bipolar II Disorder With anxious distress, 300.02 (F41.1) Generalized Anxiety Disorder or 309.81 (F43.10) Posttraumatic Stress Disorder (includes Posttraumatic Stress Disorder for Children 6 Years and Younger)  Without dissociative symptoms  Psychosocial / Contextual Factors: poorly controlled symptoms    PROMIS (reviewed every 90 days):   PROMIS 10-Global Health (only subscores and total score):       1/14/2022    11:47 AM 5/6/2022     8:30 AM 1/25/2023     7:30 AM 5/22/2023     7:31 AM   PROMIS-10 Scores Only   Global Mental Health Score 7 10    10 13 11    11   Global Physical Health Score 13 13    13 15 15    15   PROMIS TOTAL - SUBSCORES 20 23    23 28 26    26       Referral / Collaboration:  Referral to another professional/service is not indicated at this time..    Anticipated number of session for this episode of care: 5-6  Anticipation frequency of session: As determined by Dr. Frederick  Anticipated Duration of each session: 16-37 minutes  Treatment plan will be reviewed in 90 days or when goals have been changed.     MeasurableTreatment Goal(s) related to diagnosis / functional impairment(s)  Goal 1: Patient will work with providers to manage symptoms    I will know I've met my goal when I can focus and get things done.      Objective #A (Patient Action)  Patient will attend all appointments, take medication as  prescribed.  Status: Continued - Date(s): 05/22/2023     Intervention(s)  [facility] will Monitor and assist in overcoming barriers to treatment adherence    Objective #B  Patient will consider all recommendations offered.  Status: Continued - Date(s): 05/22/2023      Intervention(s)  [facility] will educate patient on treatment options, clarify concerns, work with pt to overcome any resistance to compliance.      Goal 2: Patient will replace self-harm thoughts/behaviors with self-care activities    I will know I've met my goal when I don't do any form of self-harm.      Objective #A (Patient Action)                          Status: Continued - Date(s): 05/22/2023  Patient will assist in creating safety plan.     Intervention(s)  [facility] will assist in creation of safety plan and provide copy to patient.     Objective #B  Patient will report using safety plan as needed.                       Status: Continued - Date(s): 05/22/2023     Intervention(s)  [facility] will monitor safety, use of plan, adjust plan as needed, work through any identified barriers/resistance to following her plan.      Patient has reviewed and agreed to the above plan.      Gagandeep oCrona PsyD  05/22/2023

## 2023-08-16 ENCOUNTER — OFFICE VISIT (OUTPATIENT)
Dept: FAMILY MEDICINE | Facility: CLINIC | Age: 28
End: 2023-08-16
Payer: COMMERCIAL

## 2023-08-16 VITALS
HEIGHT: 64 IN | RESPIRATION RATE: 12 BRPM | WEIGHT: 163 LBS | DIASTOLIC BLOOD PRESSURE: 86 MMHG | HEART RATE: 83 BPM | BODY MASS INDEX: 27.83 KG/M2 | SYSTOLIC BLOOD PRESSURE: 124 MMHG | OXYGEN SATURATION: 100 % | TEMPERATURE: 97.9 F

## 2023-08-16 DIAGNOSIS — L74.512 HYPERHIDROSIS OF PALMS AND SOLES: ICD-10-CM

## 2023-08-16 DIAGNOSIS — Z83.3 FAMILY HISTORY OF DIABETES MELLITUS: ICD-10-CM

## 2023-08-16 DIAGNOSIS — Z76.89 ENCOUNTER TO ESTABLISH CARE WITH NEW DOCTOR: ICD-10-CM

## 2023-08-16 DIAGNOSIS — L74.513 HYPERHIDROSIS OF PALMS AND SOLES: ICD-10-CM

## 2023-08-16 DIAGNOSIS — Z00.00 ROUTINE GENERAL MEDICAL EXAMINATION AT A HEALTH CARE FACILITY: Primary | ICD-10-CM

## 2023-08-16 PROBLEM — F41.9 ANXIETY: Status: RESOLVED | Noted: 2019-10-17 | Resolved: 2023-08-16

## 2023-08-16 LAB
ALBUMIN SERPL BCG-MCNC: 5.1 G/DL (ref 3.5–5.2)
ALP SERPL-CCNC: 58 U/L (ref 35–104)
ALT SERPL W P-5'-P-CCNC: 27 U/L (ref 0–50)
ANION GAP SERPL CALCULATED.3IONS-SCNC: 12 MMOL/L (ref 7–15)
AST SERPL W P-5'-P-CCNC: 31 U/L (ref 0–45)
BILIRUB SERPL-MCNC: 0.4 MG/DL
BUN SERPL-MCNC: 9.4 MG/DL (ref 6–20)
CALCIUM SERPL-MCNC: 9.8 MG/DL (ref 8.6–10)
CHLORIDE SERPL-SCNC: 102 MMOL/L (ref 98–107)
CREAT SERPL-MCNC: 0.92 MG/DL (ref 0.51–0.95)
DEPRECATED HCO3 PLAS-SCNC: 26 MMOL/L (ref 22–29)
ERYTHROCYTE [DISTWIDTH] IN BLOOD BY AUTOMATED COUNT: 11.2 % (ref 10–15)
GFR SERPL CREATININE-BSD FRML MDRD: 85 ML/MIN/1.73M2
GLUCOSE SERPL-MCNC: 94 MG/DL (ref 70–99)
HBA1C MFR BLD: 5.2 % (ref 0–5.6)
HCT VFR BLD AUTO: 42.1 % (ref 35–47)
HGB BLD-MCNC: 14 G/DL (ref 11.7–15.7)
MCH RBC QN AUTO: 31.6 PG (ref 26.5–33)
MCHC RBC AUTO-ENTMCNC: 33.3 G/DL (ref 31.5–36.5)
MCV RBC AUTO: 95 FL (ref 78–100)
PLATELET # BLD AUTO: 329 10E3/UL (ref 150–450)
POTASSIUM SERPL-SCNC: 4.7 MMOL/L (ref 3.4–5.3)
PROT SERPL-MCNC: 8.1 G/DL (ref 6.4–8.3)
RBC # BLD AUTO: 4.43 10E6/UL (ref 3.8–5.2)
SODIUM SERPL-SCNC: 140 MMOL/L (ref 136–145)
WBC # BLD AUTO: 5.9 10E3/UL (ref 4–11)

## 2023-08-16 PROCEDURE — 99213 OFFICE O/P EST LOW 20 MIN: CPT | Mod: 25 | Performed by: FAMILY MEDICINE

## 2023-08-16 PROCEDURE — 36415 COLL VENOUS BLD VENIPUNCTURE: CPT | Performed by: FAMILY MEDICINE

## 2023-08-16 PROCEDURE — 80053 COMPREHEN METABOLIC PANEL: CPT | Performed by: FAMILY MEDICINE

## 2023-08-16 PROCEDURE — 99395 PREV VISIT EST AGE 18-39: CPT | Performed by: FAMILY MEDICINE

## 2023-08-16 PROCEDURE — 85027 COMPLETE CBC AUTOMATED: CPT | Performed by: FAMILY MEDICINE

## 2023-08-16 PROCEDURE — 83036 HEMOGLOBIN GLYCOSYLATED A1C: CPT | Performed by: FAMILY MEDICINE

## 2023-08-16 SDOH — HEALTH STABILITY: PHYSICAL HEALTH: ON AVERAGE, HOW MANY MINUTES DO YOU ENGAGE IN EXERCISE AT THIS LEVEL?: 30 MIN

## 2023-08-16 SDOH — ECONOMIC STABILITY: FOOD INSECURITY: WITHIN THE PAST 12 MONTHS, YOU WORRIED THAT YOUR FOOD WOULD RUN OUT BEFORE YOU GOT MONEY TO BUY MORE.: NEVER TRUE

## 2023-08-16 SDOH — ECONOMIC STABILITY: FOOD INSECURITY: WITHIN THE PAST 12 MONTHS, THE FOOD YOU BOUGHT JUST DIDN'T LAST AND YOU DIDN'T HAVE MONEY TO GET MORE.: NEVER TRUE

## 2023-08-16 SDOH — ECONOMIC STABILITY: INCOME INSECURITY: HOW HARD IS IT FOR YOU TO PAY FOR THE VERY BASICS LIKE FOOD, HOUSING, MEDICAL CARE, AND HEATING?: NOT VERY HARD

## 2023-08-16 SDOH — ECONOMIC STABILITY: INCOME INSECURITY: IN THE LAST 12 MONTHS, WAS THERE A TIME WHEN YOU WERE NOT ABLE TO PAY THE MORTGAGE OR RENT ON TIME?: NO

## 2023-08-16 SDOH — HEALTH STABILITY: PHYSICAL HEALTH: ON AVERAGE, HOW MANY DAYS PER WEEK DO YOU ENGAGE IN MODERATE TO STRENUOUS EXERCISE (LIKE A BRISK WALK)?: 4 DAYS

## 2023-08-16 ASSESSMENT — ENCOUNTER SYMPTOMS
CHILLS: 0
DIARRHEA: 0
FEVER: 0
BREAST MASS: 0
SORE THROAT: 0
NAUSEA: 0
ABDOMINAL PAIN: 0
COUGH: 0
MYALGIAS: 0
EYE PAIN: 0
DIZZINESS: 0
HEMATOCHEZIA: 0
SHORTNESS OF BREATH: 0
WEAKNESS: 0
NERVOUS/ANXIOUS: 1
PARESTHESIAS: 0
PALPITATIONS: 0
HEMATURIA: 0
JOINT SWELLING: 0
HEARTBURN: 0
DYSURIA: 0
CONSTIPATION: 0
FREQUENCY: 0
HEADACHES: 0
ARTHRALGIAS: 0

## 2023-08-16 ASSESSMENT — ANXIETY QUESTIONNAIRES
GAD7 TOTAL SCORE: 6
5. BEING SO RESTLESS THAT IT IS HARD TO SIT STILL: NOT AT ALL
1. FEELING NERVOUS, ANXIOUS, OR ON EDGE: SEVERAL DAYS
6. BECOMING EASILY ANNOYED OR IRRITABLE: SEVERAL DAYS
3. WORRYING TOO MUCH ABOUT DIFFERENT THINGS: SEVERAL DAYS
IF YOU CHECKED OFF ANY PROBLEMS ON THIS QUESTIONNAIRE, HOW DIFFICULT HAVE THESE PROBLEMS MADE IT FOR YOU TO DO YOUR WORK, TAKE CARE OF THINGS AT HOME, OR GET ALONG WITH OTHER PEOPLE: SOMEWHAT DIFFICULT
GAD7 TOTAL SCORE: 6
2. NOT BEING ABLE TO STOP OR CONTROL WORRYING: SEVERAL DAYS
7. FEELING AFRAID AS IF SOMETHING AWFUL MIGHT HAPPEN: SEVERAL DAYS

## 2023-08-16 ASSESSMENT — SOCIAL DETERMINANTS OF HEALTH (SDOH)
IN A TYPICAL WEEK, HOW MANY TIMES DO YOU TALK ON THE PHONE WITH FAMILY, FRIENDS, OR NEIGHBORS?: NEVER
HOW OFTEN DO YOU ATTEND CHURCH OR RELIGIOUS SERVICES?: NEVER
HOW OFTEN DO YOU GET TOGETHER WITH FRIENDS OR RELATIVES?: TWICE A WEEK
DO YOU BELONG TO ANY CLUBS OR ORGANIZATIONS SUCH AS CHURCH GROUPS UNIONS, FRATERNAL OR ATHLETIC GROUPS, OR SCHOOL GROUPS?: YES

## 2023-08-16 ASSESSMENT — PATIENT HEALTH QUESTIONNAIRE - PHQ9
SUM OF ALL RESPONSES TO PHQ QUESTIONS 1-9: 9
5. POOR APPETITE OR OVEREATING: SEVERAL DAYS

## 2023-08-16 ASSESSMENT — LIFESTYLE VARIABLES
HOW OFTEN DO YOU HAVE A DRINK CONTAINING ALCOHOL: 2-3 TIMES A WEEK
SKIP TO QUESTIONS 9-10: 1
HOW MANY STANDARD DRINKS CONTAINING ALCOHOL DO YOU HAVE ON A TYPICAL DAY: 1 OR 2
HOW OFTEN DO YOU HAVE SIX OR MORE DRINKS ON ONE OCCASION: NEVER
AUDIT-C TOTAL SCORE: 3

## 2023-08-16 NOTE — PROGRESS NOTES
SUBJECTIVE:   CC: Pooja is an 28 year old who presents for preventive health visit.       8/16/2023     1:43 PM   Additional Questions   Roomed by Farrah GU         8/16/2023     1:57 PM   Patient Reported Additional Medications   Patient reports taking the following new medications magnesium, zinc, Omega 3         Healthy Habits:     Getting at least 3 servings of Calcium per day:  Yes    Bi-annual eye exam:  Yes    Dental care twice a year:  NO    Sleep apnea or symptoms of sleep apnea:  Daytime drowsiness    Diet:  Vegetarian/vegan    Frequency of exercise:  2-3 days/week    Duration of exercise:  15-30 minutes    Taking medications regularly:  Yes    Medication side effects:  None    Additional concerns today:  Yes    Needs to establish care.    Medication management: Mental health, diagnosed with Bipolar disorder, depression, anxiety, and panic attacks.  Sees a therapist every other week.  Takes  medications, have not been adjusted in over a year, doing well.  Takes hydroxyzine for sleep, sometimes does not help with the actual sleeping part but does work for anxiety and panic and help to calm her mind.  Takes one capsule, Rx for 1-2.    Hands and feet sweat excessively.  More than the rest of her body, has had it for most of her life.  She has always forgotten to tell someone about it because it never happens at the clinic.    Social History     Tobacco Use    Smoking status: Never    Smokeless tobacco: Never   Substance Use Topics    Alcohol use: Yes           8/16/2023     1:35 PM   Alcohol Use   Prescreen: >3 drinks/day or >7 drinks/week? No           4/12/2021     8:49 AM   AUDIT - Alcohol Use Disorders Identification Test - Reproduced from the World Health Organization Audit 2001 (Second Edition)   1.  How often do you have a drink containing alcohol? 2 to 3 times a week   2.  How many drinks containing alcohol do you have on a typical day when you are drinking? 1 or 2   3.  How often do you have five or  more drinks on one occasion? Never   4.  How often during the last year have you found that you were not able to stop drinking once you had started? Never   5.  How often during the last year have you failed to do what was normally expected of you because of drinking? Never   6.  How often during the last year have you needed a first drink in the morning to get yourself going after a heavy drinking session? Never   7.  How often during the last year have you had a feeling of guilt or remorse after drinking? Never   8.  How often during the last year have you been unable to remember what happened the night before because of your drinking? Never   9.  Have you or someone else been injured because of your drinking? No   10. Has a relative, friend, doctor or other health care worker been concerned about your drinking or suggested you cut down? No   TOTAL SCORE 3     Reviewed orders with patient.  Reviewed health maintenance and updated orders accordingly - Yes  Lab work is in process  Labs reviewed in EPIC  BP Readings from Last 3 Encounters:   23 124/86   23 120/78   21 104/66    Wt Readings from Last 3 Encounters:   23 73.9 kg (163 lb)   21 78.9 kg (174 lb)   21 80.6 kg (177 lb 11.2 oz)                  Patient Active Problem List   Diagnosis    Moderate episode of recurrent major depressive disorder (H)    ACP (advance care planning)    Health Care Home    ALICE (generalized anxiety disorder)    Panic disorder without agoraphobia    Bipolar 2 disorder (H)    PTSD (post-traumatic stress disorder)     Past Surgical History:   Procedure Laterality Date     SECTION      CONTRACEPT IUD  2018    OB/gyn    HC TOOTH EXTRACTION W/FORCEP  2016       Social History     Tobacco Use    Smoking status: Never    Smokeless tobacco: Never   Substance Use Topics    Alcohol use: Yes     Family History   Problem Relation Age of Onset    Diabetes Type 2  Mother     Bipolar Disorder Mother      Depression Father     Alcoholism Father     Cerebrovascular Disease Maternal Grandmother     Breast Cancer Maternal Aunt 40    Colon Cancer No family hx of          Current Outpatient Medications   Medication Sig Dispense Refill    aluminum chloride (DRYSOL) 20 % external solution Apply topically At Bedtime 60 mL 3    buPROPion (WELLBUTRIN SR) 100 MG 12 hr tablet Take 1 tablet (100 mg) by mouth 2 times daily 180 tablet 3    busPIRone (BUSPAR) 10 MG tablet Take 2 tablets (20 mg) by mouth 2 times daily 360 tablet 3    Cholecalciferol (VITAMIN D-3 PO)       Fexofenadine HCl (ALLERGY 24-HR PO)       hydrOXYzine (VISTARIL) 25 MG capsule Take 1-2 capsules (25-50 mg) by mouth nightly as needed (sleep) 180 capsule 3    lamoTRIgine (LAMICTAL) 100 MG tablet Take 1 tablet (100 mg) by mouth daily 90 tablet 3    levonorgestrel (MIRENA) 20 MCG/24HR IUD 1 Device by Intrauterine route      multivitamin w/minerals (MULTI-VITAMIN) tablet Take 1 tablet by mouth daily       No Known Allergies  Recent Labs   Lab Test 09/22/21  1641 04/11/20  2139   ALT 43  --    CR 0.79 0.74   GFRESTIMATED >90 >90   GFRESTBLACK  --  >90   POTASSIUM 4.1 4.1   TSH 0.74  --         Breast Cancer Screening:    FHS-7:       8/16/2023     1:42 PM   Breast CA Risk Assessment (FHS-7)   Did any of your first-degree relatives have breast or ovarian cancer? No   Did any of your relatives have bilateral breast cancer? Unknown   Did any man in your family have breast cancer? No   Did any woman in your family have breast and ovarian cancer? Yes   Did any woman in your family have breast cancer before age 50 y? Unknown   Do you have 2 or more relatives with breast and/or ovarian cancer? Unknown   Do you have 2 or more relatives with breast and/or bowel cancer? Unknown       Patient under 40 years of age: Routine Mammogram Screening not recommended.   Pertinent mammograms are reviewed under the imaging tab.    History of abnormal Pap smear: NO - age 30-65 PAP every 5  "years with negative HPV co-testing recommended      2021     3:48 PM   PAP / HPV   PAP Negative for Intraepithelial Lesion or Malignancy (NILM)      Reviewed and updated as needed this visit by clinical staff   Tobacco  Allergies  Meds  Problems  Med Hx  Surg Hx  Fam Hx  Soc   Hx        Reviewed and updated as needed this visit by Provider   Tobacco    Problems  Med Hx  Surg Hx  Fam Hx  Soc Hx       Past Medical History:   Diagnosis Date    Anxiety 10/17/2019    Depressive disorder     ALICE (generalized anxiety disorder) 2020    Moderate episode of recurrent major depressive disorder (H) 2020    Panic disorder       Past Surgical History:   Procedure Laterality Date     SECTION      CONTRACEPT IUD      OB/gyn    HC TOOTH EXTRACTION W/FORCEP  2016       Review of Systems   Constitutional:  Negative for chills and fever.   HENT:  Negative for congestion, ear pain, hearing loss and sore throat.    Eyes:  Negative for pain and visual disturbance.   Respiratory:  Negative for cough and shortness of breath.    Cardiovascular:  Negative for chest pain, palpitations and peripheral edema.   Gastrointestinal:  Negative for abdominal pain, constipation, diarrhea, heartburn, hematochezia and nausea.   Breasts:  Negative for tenderness, breast mass and discharge.   Genitourinary:  Negative for dysuria, frequency, genital sores, hematuria, pelvic pain, urgency, vaginal bleeding and vaginal discharge.   Musculoskeletal:  Negative for arthralgias, joint swelling and myalgias.   Skin:  Negative for rash.   Neurological:  Negative for dizziness, weakness, headaches and paresthesias.   Psychiatric/Behavioral:  Negative for mood changes. The patient is nervous/anxious.           OBJECTIVE:   /86 (BP Location: Right arm, Patient Position: Chair, Cuff Size: Adult Regular)   Pulse 83   Temp 97.9  F (36.6  C) (Oral)   Resp 12   Ht 1.613 m (5' 3.5\")   Wt 73.9 kg (163 lb)   SpO2 100%   " BMI 28.42 kg/m    Physical Exam  GENERAL: healthy, alert and no distress  EYES: Eyes grossly normal to inspection, PERRL and conjunctivae and sclerae normal  HENT: ear canals and TM's normal, nose and mouth without ulcers or lesions  NECK: no adenopathy, no asymmetry, masses, or scars and thyroid normal to palpation  RESP: lungs clear to auscultation - no rales, rhonchi or wheezes  CV: regular rate and rhythm, normal S1 S2, no S3 or S4, no murmur, click or rub, no peripheral edema and peripheral pulses strong  ABDOMEN: soft, nontender, no hepatosplenomegaly, no masses and bowel sounds normal  MS: no gross musculoskeletal defects noted, no edema  SKIN: no suspicious lesions or rashes  NEURO: Normal strength and tone, mentation intact and speech normal  PSYCH: mentation appears normal, affect normal/bright    Diagnostic Test Results:  Labs reviewed in Epic    ASSESSMENT/PLAN:       ICD-10-CM    1. Routine general medical examination at a health care facility  Z00.00 CBC with platelets     Comprehensive metabolic panel (BMP + Alb, Alk Phos, ALT, AST, Total. Bili, TP)     CBC with platelets     Comprehensive metabolic panel (BMP + Alb, Alk Phos, ALT, AST, Total. Bili, TP)      2. Encounter to establish care with new doctor  Z76.89 Reviewed problem list, medications, allergies, past medical history, surgical history, social history, and family history.      3. Hyperhidrosis of palms and soles  L74.512 aluminum chloride (DRYSOL) 20 % external solution    L74.513       4. Family history of diabetes mellitus  Z83.3 Hemoglobin A1c     Hemoglobin A1c          Patient has been advised of split billing requirements and indicates understanding: Yes      COUNSELING:  Reviewed preventive health counseling, as reflected in patient instructions        She reports that she has never smoked. She has never used smokeless tobacco.          Vanita Shea MD  [facility]

## 2023-08-16 NOTE — COMMUNITY RESOURCES LIST (ENGLISH)
08/16/2023   [facility] - Outpatient Clinics  N/A  For questions about this resource list or additional care needs, please contact your primary care clinic or care manager.  Phone: [phone]   Email: N/A   Address: [address] [place] [id number]   Hours: N/A        Mental Health       Individual counseling  1  [facility] Distance: 0.55 miles      In-Person, Phone/Virtual   [address] [place] [address]  Language: English  Hours: Mon - Thu 8:00 AM - 8:00 PM , Fri 8:00 AM - 4:00 PM  Fees: Insurance, Self Pay, Sliding Fee   Phone: (918) 774-2668 Email: supportstaff.rep@[employer]. Website: http://www.counselingcare./     2  [facility] Distance: 0.58 miles      In-Person, Phone/Virtual   [address] [place] [address]  Language: English  Hours: Mon - Fri 7:30 AM - 7:00 PM  Fees: Insurance, Self Pay, Sliding Fee   Phone: (478) 366-4910 Website: http://counselingcare.     Mental health support group  3  [facility] Distance: 5.12 miles      In-Person, Phone/Virtual   [address] [address] [place] [address]  Language: English  Hours: Mon - Fri 9:00 AM - 5:00 PM Appt. Only  Fees: Free   Phone: (934) 583-7576 Email: [email or web address] Website: http://www.[employer].org/     4  [facility], [facility] Distance: 5.9 miles      Phone/Virtual   [address] [place] [address]  Language: English  Hours: Tue - Fri 8:00 AM - 7:00 PM , Sat 9:00 AM - 4:00 PM  Fees: Insurance, Self Pay   Phone: (225) 107-3879 Email: claryfortcare@[employer] Website: http://www.[employer].com/          Important Numbers & Websites       Emergency Services   911  [facility] Services   311  Poison Control   (343) 741-6110  Suicide Prevention Lifeline   (616) 491-9148 (TALK)  Child Abuse Hotline   (883) 318-5568 (4-A-Child)  Sexual Assault Hotline   (199) 734-8901  (HOPE)  National Runaway Safeline   (652) 835-4649 (RUNAWAY)  All-Options Talkline   (185) 417-3274  Substance Abuse Referral   (110) 723-9871 (HELP)

## 2023-10-03 ENCOUNTER — E-VISIT (OUTPATIENT)
Dept: URGENT CARE | Facility: CLINIC | Age: 28
End: 2023-10-03
Payer: COMMERCIAL

## 2023-10-03 DIAGNOSIS — N39.0 ACUTE UTI (URINARY TRACT INFECTION): Primary | ICD-10-CM

## 2023-10-03 PROCEDURE — 99421 OL DIG E/M SVC 5-10 MIN: CPT | Performed by: EMERGENCY MEDICINE

## 2023-10-03 RX ORDER — SULFAMETHOXAZOLE/TRIMETHOPRIM 800-160 MG
1 TABLET ORAL 2 TIMES DAILY
Qty: 10 TABLET | Refills: 0 | Status: SHIPPED | OUTPATIENT
Start: 2023-10-03 | End: 2023-10-08

## 2023-10-03 NOTE — PATIENT INSTRUCTIONS
Dear Pooja High    After reviewing your responses, I've been able to diagnose you with a urinary tract infection, which is a common infection of the bladder with bacteria.  This is not a sexually transmitted infection, though urinating immediately after intercourse can help prevent infections.  Drinking lots of fluids is also helpful to clear your current infection and prevent the next one.      I have sent a prescription for antibiotics to your pharmacy to treat this infection.    It is important that you take all of your prescribed medication even if your symptoms are improving after a few doses.  Taking all of your medicine helps prevent the symptoms from returning.     If your symptoms worsen, you develop pain in your back or stomach, develop fevers, or are not improving in 5 days, please contact your primary care provider for an appointment or visit any of our convenient Walk-in or Urgent Care Centers to be seen, which can be found on our website here.    Thanks again for choosing us as your health care partner,    Prosper Silva MD

## 2024-04-05 ENCOUNTER — E-VISIT (OUTPATIENT)
Dept: FAMILY MEDICINE | Facility: CLINIC | Age: 29
End: 2024-04-05
Payer: COMMERCIAL

## 2024-04-05 ENCOUNTER — MYC MEDICAL ADVICE (OUTPATIENT)
Dept: FAMILY MEDICINE | Facility: CLINIC | Age: 29
End: 2024-04-05
Payer: COMMERCIAL

## 2024-04-05 DIAGNOSIS — Z11.3 SCREEN FOR STD (SEXUALLY TRANSMITTED DISEASE): Primary | ICD-10-CM

## 2024-04-05 PROCEDURE — 99421 OL DIG E/M SVC 5-10 MIN: CPT | Performed by: FAMILY MEDICINE

## 2024-04-05 NOTE — PATIENT INSTRUCTIONS
Thank you for choosing us for your care. Given your symptoms, I would like you to do a lab-only visit to determine what is causing them.  I have placed the orders.  Please schedule an appointment with the lab right here in [facility], or call [phone].  I will let you know when the results are back and next steps to take.

## 2024-04-05 NOTE — TELEPHONE ENCOUNTER
See my chart - evisit recommended     Dede Avila, Registered Nurse  [facility]

## 2024-04-06 ENCOUNTER — LAB (OUTPATIENT)
Dept: LAB | Facility: CLINIC | Age: 29
End: 2024-04-06
Payer: COMMERCIAL

## 2024-04-06 DIAGNOSIS — Z11.3 SCREEN FOR STD (SEXUALLY TRANSMITTED DISEASE): ICD-10-CM

## 2024-04-06 PROCEDURE — 87491 CHLMYD TRACH DNA AMP PROBE: CPT

## 2024-04-06 PROCEDURE — 36415 COLL VENOUS BLD VENIPUNCTURE: CPT

## 2024-04-06 PROCEDURE — 86780 TREPONEMA PALLIDUM: CPT

## 2024-04-06 PROCEDURE — 87389 HIV-1 AG W/HIV-1&-2 AB AG IA: CPT

## 2024-04-06 PROCEDURE — 87591 N.GONORRHOEAE DNA AMP PROB: CPT

## 2024-04-07 LAB — HIV 1+2 AB+HIV1 P24 AG SERPL QL IA: NONREACTIVE

## 2024-04-08 LAB
C TRACH DNA SPEC QL PROBE+SIG AMP: NEGATIVE
N GONORRHOEA DNA SPEC QL NAA+PROBE: NEGATIVE
T PALLIDUM AB SER QL: NONREACTIVE

## 2024-04-17 ENCOUNTER — OFFICE VISIT (OUTPATIENT)
Dept: FAMILY MEDICINE | Facility: CLINIC | Age: 29
End: 2024-04-17
Payer: COMMERCIAL

## 2024-04-17 VITALS
TEMPERATURE: 98 F | HEART RATE: 89 BPM | OXYGEN SATURATION: 100 % | HEIGHT: 64 IN | BODY MASS INDEX: 28.54 KG/M2 | DIASTOLIC BLOOD PRESSURE: 77 MMHG | SYSTOLIC BLOOD PRESSURE: 121 MMHG | WEIGHT: 167.2 LBS | RESPIRATION RATE: 16 BRPM

## 2024-04-17 DIAGNOSIS — F31.81 BIPOLAR II DISORDER (H): Primary | ICD-10-CM

## 2024-04-17 DIAGNOSIS — F41.1 GAD (GENERALIZED ANXIETY DISORDER): ICD-10-CM

## 2024-04-17 PROCEDURE — 99213 OFFICE O/P EST LOW 20 MIN: CPT | Performed by: FAMILY MEDICINE

## 2024-04-17 RX ORDER — LAMOTRIGINE 100 MG/1
100 TABLET ORAL DAILY
Qty: 90 TABLET | Refills: 3 | Status: SHIPPED | OUTPATIENT
Start: 2024-04-17 | End: 2024-06-25

## 2024-04-17 RX ORDER — BUPROPION HYDROCHLORIDE 100 MG/1
100 TABLET, EXTENDED RELEASE ORAL 2 TIMES DAILY
Qty: 180 TABLET | Refills: 3 | Status: SHIPPED | OUTPATIENT
Start: 2024-04-17

## 2024-04-17 RX ORDER — BUSPIRONE HYDROCHLORIDE 10 MG/1
20 TABLET ORAL 2 TIMES DAILY
Qty: 360 TABLET | Refills: 3 | Status: SHIPPED | OUTPATIENT
Start: 2024-04-17

## 2024-04-17 RX ORDER — HYDROXYZINE PAMOATE 25 MG/1
25-50 CAPSULE ORAL
Qty: 180 CAPSULE | Refills: 3 | Status: SHIPPED | OUTPATIENT
Start: 2024-04-17

## 2024-04-17 ASSESSMENT — ANXIETY QUESTIONNAIRES
5. BEING SO RESTLESS THAT IT IS HARD TO SIT STILL: NOT AT ALL
GAD7 TOTAL SCORE: 8
2. NOT BEING ABLE TO STOP OR CONTROL WORRYING: SEVERAL DAYS
3. WORRYING TOO MUCH ABOUT DIFFERENT THINGS: SEVERAL DAYS
GAD7 TOTAL SCORE: 8
1. FEELING NERVOUS, ANXIOUS, OR ON EDGE: NEARLY EVERY DAY
IF YOU CHECKED OFF ANY PROBLEMS ON THIS QUESTIONNAIRE, HOW DIFFICULT HAVE THESE PROBLEMS MADE IT FOR YOU TO DO YOUR WORK, TAKE CARE OF THINGS AT HOME, OR GET ALONG WITH OTHER PEOPLE: SOMEWHAT DIFFICULT
7. FEELING AFRAID AS IF SOMETHING AWFUL MIGHT HAPPEN: NOT AT ALL
6. BECOMING EASILY ANNOYED OR IRRITABLE: MORE THAN HALF THE DAYS

## 2024-04-17 ASSESSMENT — PATIENT HEALTH QUESTIONNAIRE - PHQ9
SUM OF ALL RESPONSES TO PHQ QUESTIONS 1-9: 10
SUM OF ALL RESPONSES TO PHQ QUESTIONS 1-9: 10
10. IF YOU CHECKED OFF ANY PROBLEMS, HOW DIFFICULT HAVE THESE PROBLEMS MADE IT FOR YOU TO DO YOUR WORK, TAKE CARE OF THINGS AT HOME, OR GET ALONG WITH OTHER PEOPLE: SOMEWHAT DIFFICULT
5. POOR APPETITE OR OVEREATING: SEVERAL DAYS

## 2024-04-17 ASSESSMENT — PAIN SCALES - GENERAL: PAINLEVEL: NO PAIN (0)

## 2024-04-17 NOTE — PROGRESS NOTES
"  Assessment & Plan     Bipolar II disorder (H)  Controlled, continue current medications.  Follow up in 6 months or sooner as needed.  - buPROPion (WELLBUTRIN SR) 100 MG 12 hr tablet; Take 1 tablet (100 mg) by mouth 2 times daily  - lamoTRIgine (LAMICTAL) 100 MG tablet; Take 1 tablet (100 mg) by mouth daily    ALICE (generalized anxiety disorder)  Controlled, continue current medications.  Follow up in 6 months or sooner as needed.  - busPIRone (BUSPAR) 10 MG tablet; Take 2 tablets (20 mg) by mouth 2 times daily  - hydrOXYzine tomas (VISTARIL) 25 MG capsule; Take 1-2 capsules (25-50 mg) by mouth nightly as needed (sleep)      7 minutes spent by me on the date of the encounter doing chart review, history and exam, documentation and further activities per the note      BMI  Estimated body mass index is 29.15 kg/m  as calculated from the following:    Height as of this encounter: 1.613 m (5' 3.5\").    Weight as of this encounter: 75.8 kg (167 lb 3.2 oz).         See Patient Instructions    Emilee Patton is a 28 year old, presenting for the following health issues:  Depression and Anxiety        4/17/2024     2:07 PM   Additional Questions   Roomed by Edwige CALVERT MA     History of Present Illness       Reason for visit:  Med follow up    She eats 2-3 servings of fruits and vegetables daily.She consumes 1 sweetened beverage(s) daily.She exercises with enough effort to increase her heart rate 20 to 29 minutes per day.  She exercises with enough effort to increase her heart rate 4 days per week.   She is taking medications regularly.     Depression and Anxiety   How are you doing with your depression since your last visit? Improved   How are you doing with your anxiety since your last visit?  No change  Are you having other symptoms that might be associated with depression or anxiety? Yes:  repetitives thoughts, sometimes  Have you had a significant life event? No   Do you have any concerns with your use of alcohol or " other drugs? No    Needs to get medications refilled.    Sleep not improved with hydroxyzine, though it does help her calm down. Uses an OTC sleep aid, melatonin sometimes helps.  Melatonin has her feeling tired the next day.    Social History     Tobacco Use    Smoking status: Never    Smokeless tobacco: Never   Vaping Use    Vaping status: Never Used   Substance Use Topics    Alcohol use: Yes    Drug use: Not Currently     Types: Marijuana     Comment: tried a few times in college         8/16/2023     2:33 PM 4/17/2024     2:00 PM 4/17/2024     2:19 PM   PHQ   PHQ-9 Total Score 9 10 10   Q9: Thoughts of better off dead/self-harm past 2 weeks Not at all Not at all Not at all         5/22/2023     7:30 AM 8/16/2023     2:33 PM 4/17/2024     2:19 PM   ALICE-7 SCORE   Total Score 10 (moderate anxiety)     Total Score 10    10 6 8         4/17/2024     2:19 PM   Last PHQ-9   1.  Little interest or pleasure in doing things 1   2.  Feeling down, depressed, or hopeless 1   3.  Trouble falling or staying asleep, or sleeping too much 3   4.  Feeling tired or having little energy 2   5.  Poor appetite or overeating 2   6.  Feeling bad about yourself 0   7.  Trouble concentrating 1   8.  Moving slowly or restless 0   Q9: Thoughts of better off dead/self-harm past 2 weeks 0   PHQ-9 Total Score 10         4/17/2024     2:19 PM   ALICE-7    1. Feeling nervous, anxious, or on edge 3   2. Not being able to stop or control worrying 1   3. Worrying too much about different things 1   4. Trouble relaxing 1   5. Being so restless that it is hard to sit still 0   6. Becoming easily annoyed or irritable 2   7. Feeling afraid, as if something awful might happen 0   ALICE-7 Total Score 8   If you checked any problems, how difficult have they made it for you to do your work, take care of things at home, or get along with other people? Somewhat difficult     Suicide Assessment Five-step Evaluation and Treatment (SAFE-T)      Current Outpatient  "Medications   Medication Sig Dispense Refill    aluminum chloride (DRYSOL) 20 % external solution Apply topically At Bedtime 60 mL 3    buPROPion (WELLBUTRIN SR) 100 MG 12 hr tablet Take 1 tablet (100 mg) by mouth 2 times daily 180 tablet 3    busPIRone (BUSPAR) 10 MG tablet Take 2 tablets (20 mg) by mouth 2 times daily 360 tablet 3    Cholecalciferol (VITAMIN D-3 PO)       Fexofenadine HCl (ALLERGY 24-HR PO)       hydrOXYzine tomas (VISTARIL) 25 MG capsule Take 1-2 capsules (25-50 mg) by mouth nightly as needed (sleep) 180 capsule 3    lamoTRIgine (LAMICTAL) 100 MG tablet Take 1 tablet (100 mg) by mouth daily 90 tablet 3    levonorgestrel (MIRENA) 20 MCG/24HR IUD 1 Device by Intrauterine route      multivitamin w/minerals (MULTI-VITAMIN) tablet Take 1 tablet by mouth daily             Objective    /77 (BP Location: Left arm, Patient Position: Sitting, Cuff Size: Adult Regular)   Pulse 89   Temp 98  F (36.7  C) (Temporal)   Resp 16   Ht 1.613 m (5' 3.5\")   Wt 75.8 kg (167 lb 3.2 oz)   LMP  (LMP Unknown)   SpO2 100%   BMI 29.15 kg/m    Body mass index is 29.15 kg/m .  Physical Exam   GENERAL: alert and no distress  PSYCH: mentation appears normal, affect normal/bright          Signed Electronically by: Vanita Shea MD    "

## 2024-06-17 PROBLEM — Z76.89 HEALTH CARE HOME: Status: RESOLVED | Noted: 2020-09-22 | Resolved: 2024-06-17

## 2024-06-24 ENCOUNTER — MYC MEDICAL ADVICE (OUTPATIENT)
Dept: FAMILY MEDICINE | Facility: CLINIC | Age: 29
End: 2024-06-24
Payer: COMMERCIAL

## 2024-06-25 ENCOUNTER — E-VISIT (OUTPATIENT)
Dept: FAMILY MEDICINE | Facility: CLINIC | Age: 29
End: 2024-06-25
Payer: COMMERCIAL

## 2024-06-25 DIAGNOSIS — F31.81 BIPOLAR II DISORDER (H): ICD-10-CM

## 2024-06-25 PROCEDURE — 99421 OL DIG E/M SVC 5-10 MIN: CPT | Performed by: FAMILY MEDICINE

## 2024-06-25 RX ORDER — LAMOTRIGINE 150 MG/1
150 TABLET ORAL DAILY
Qty: 90 TABLET | Refills: 1 | Status: SHIPPED | OUTPATIENT
Start: 2024-06-25

## 2024-06-25 ASSESSMENT — ANXIETY QUESTIONNAIRES
1. FEELING NERVOUS, ANXIOUS, OR ON EDGE: MORE THAN HALF THE DAYS
GAD7 TOTAL SCORE: 12
2. NOT BEING ABLE TO STOP OR CONTROL WORRYING: MORE THAN HALF THE DAYS
7. FEELING AFRAID AS IF SOMETHING AWFUL MIGHT HAPPEN: SEVERAL DAYS
6. BECOMING EASILY ANNOYED OR IRRITABLE: NEARLY EVERY DAY
7. FEELING AFRAID AS IF SOMETHING AWFUL MIGHT HAPPEN: SEVERAL DAYS
8. IF YOU CHECKED OFF ANY PROBLEMS, HOW DIFFICULT HAVE THESE MADE IT FOR YOU TO DO YOUR WORK, TAKE CARE OF THINGS AT HOME, OR GET ALONG WITH OTHER PEOPLE?: SOMEWHAT DIFFICULT
5. BEING SO RESTLESS THAT IT IS HARD TO SIT STILL: NOT AT ALL
GAD7 TOTAL SCORE: 12
GAD7 TOTAL SCORE: 12
3. WORRYING TOO MUCH ABOUT DIFFERENT THINGS: MORE THAN HALF THE DAYS
4. TROUBLE RELAXING: MORE THAN HALF THE DAYS
IF YOU CHECKED OFF ANY PROBLEMS ON THIS QUESTIONNAIRE, HOW DIFFICULT HAVE THESE PROBLEMS MADE IT FOR YOU TO DO YOUR WORK, TAKE CARE OF THINGS AT HOME, OR GET ALONG WITH OTHER PEOPLE: SOMEWHAT DIFFICULT

## 2024-06-25 ASSESSMENT — PATIENT HEALTH QUESTIONNAIRE - PHQ9
10. IF YOU CHECKED OFF ANY PROBLEMS, HOW DIFFICULT HAVE THESE PROBLEMS MADE IT FOR YOU TO DO YOUR WORK, TAKE CARE OF THINGS AT HOME, OR GET ALONG WITH OTHER PEOPLE: VERY DIFFICULT
SUM OF ALL RESPONSES TO PHQ QUESTIONS 1-9: 13
SUM OF ALL RESPONSES TO PHQ QUESTIONS 1-9: 13

## 2024-06-26 ASSESSMENT — PATIENT HEALTH QUESTIONNAIRE - PHQ9: SUM OF ALL RESPONSES TO PHQ QUESTIONS 1-9: 13

## 2024-06-26 NOTE — PATIENT INSTRUCTIONS
Thank you for choosing us for your care. I have placed an order for your treatment: No orders of the defined types were placed in this encounter.   View your full visit summary for details by clicking on the link below. Your pharmacist will able to address any questions you may have about the medication.      If you're not feeling better within 4-6 weeks please schedule an appointment.  You can schedule an appointment right here in [facility], or call [phone]  If the visit is for the same symptoms as your eVisit, we'll refund the cost of your eVisit if seen within seven days.

## 2024-08-28 ENCOUNTER — OFFICE VISIT (OUTPATIENT)
Dept: FAMILY MEDICINE | Facility: CLINIC | Age: 29
End: 2024-08-28
Attending: FAMILY MEDICINE
Payer: COMMERCIAL

## 2024-08-28 VITALS
TEMPERATURE: 98.3 F | DIASTOLIC BLOOD PRESSURE: 77 MMHG | OXYGEN SATURATION: 99 % | HEART RATE: 79 BPM | RESPIRATION RATE: 17 BRPM | SYSTOLIC BLOOD PRESSURE: 116 MMHG | WEIGHT: 167.4 LBS | HEIGHT: 64 IN | BODY MASS INDEX: 28.58 KG/M2

## 2024-08-28 DIAGNOSIS — Z00.00 ROUTINE GENERAL MEDICAL EXAMINATION AT A HEALTH CARE FACILITY: Primary | ICD-10-CM

## 2024-08-28 DIAGNOSIS — Z12.4 SCREENING FOR MALIGNANT NEOPLASM OF CERVIX: ICD-10-CM

## 2024-08-28 PROCEDURE — 87624 HPV HI-RISK TYP POOLED RSLT: CPT | Performed by: FAMILY MEDICINE

## 2024-08-28 PROCEDURE — G0145 SCR C/V CYTO,THINLAYER,RESCR: HCPCS | Performed by: FAMILY MEDICINE

## 2024-08-28 PROCEDURE — 99395 PREV VISIT EST AGE 18-39: CPT | Performed by: FAMILY MEDICINE

## 2024-08-28 ASSESSMENT — PAIN SCALES - GENERAL: PAINLEVEL: NO PAIN (0)

## 2024-08-28 ASSESSMENT — ANXIETY QUESTIONNAIRES
GAD7 TOTAL SCORE: 10
8. IF YOU CHECKED OFF ANY PROBLEMS, HOW DIFFICULT HAVE THESE MADE IT FOR YOU TO DO YOUR WORK, TAKE CARE OF THINGS AT HOME, OR GET ALONG WITH OTHER PEOPLE?: SOMEWHAT DIFFICULT
7. FEELING AFRAID AS IF SOMETHING AWFUL MIGHT HAPPEN: SEVERAL DAYS

## 2024-08-28 NOTE — PROGRESS NOTES
"Preventive Care Visit  [facility]  April JPhillip Shea MD, Family Medicine  Aug 28, 2024      Assessment & Plan     Routine general medical examination at a health care facility  Exam completed today, routine health maintenance items updated as able.  Labs ordered.  Follow up one year or sooner as needed.      Screening for malignant neoplasm of cervix  - HPV and Gynecologic Cytology Panel - Recommended Age 30-65 Years  - Gynecologic Cytology (PAP)    BMI  Estimated body mass index is 29.19 kg/m  as calculated from the following:    Height as of this encounter: 1.613 m (5' 3.5\").    Weight as of this encounter: 75.9 kg (167 lb 6.4 oz).   Weight management plan: Discussed healthy diet and exercise guidelines    Counseling  Appropriate preventive services were addressed with this patient via screening, questionnaire, or discussion as appropriate for fall prevention, nutrition, physical activity, Tobacco-use cessation, social engagement, weight loss and cognition.  Checklist reviewing preventive services available has been given to the patient.  Reviewed patient's diet, addressing concerns and/or questions.   The patient was instructed to see the dentist every 6 months.   The patient's PHQ-9 score is consistent with moderate depression. She was provided with information regarding depression.       See Patient Instructions    Emilee Patton is a 29 year old, presenting for the following:  Physical and Mass (Lower left abdomen)        8/28/2024     3:14 PM   Additional Questions   Roomed by Shyla Mercedes EMT-B        Health Care Directive  Patient does not have a Health Care Directive or Living Will: Discussed advance care planning with patient; however, patient declined at this time.    HPI    Here for wellness visit.    Skin Lesion  Onset/Duration: 6 months  Description  Location: lower left abdomen  Color: multicolored  Border description: under skin, soft mass, about the size of " pencil eraser        Character: n/a  Itching: no  Bleeding:  No  Intensity:  mild  Progression of Symptoms:  same and constant  Accompanying signs and symptoms:   Bleeding: No  Scaling: No  Excessive sun exposure/tanning: No  Sunscreen used: YES  History:           Any previous history of skin cancer: No  Any family history of melanoma: unknown  Previous episodes of similar lesion: No  Precipitating or alleviating factors: none  Therapies tried and outcome: none    Thinks it might be a pimple on her waistline. Never went away.  Does not hurt, not itchy.  Forgets its there until she sees it.  No change, not getting bigger or smaller.    Drysol solution makes her hands dry, aware that they are dry but not cracking or peeling, sweating improved.  Using it maybe once every week or every other week.    Of note, uses Unisom PRN when hydroxyzine does not work well.  She will not use them simultaneously.            8/28/2024   General Health   How would you rate your overall physical health? Good   Feel stress (tense, anxious, or unable to sleep) Very much      (!) STRESS CONCERN      8/28/2024   Nutrition   Three or more servings of calcium each day? Yes   Diet: Vegetarian/vegan   How many servings of fruit and vegetables per day? (!) 2-3   How many sweetened beverages each day? 0-1            8/28/2024   Exercise   Days per week of moderate/strenous exercise 4 days   Average minutes spent exercising at this level 30 min            8/28/2024   Social Factors   Frequency of gathering with friends or relatives More than three times a week   Worry food won't last until get money to buy more No   Food not last or not have enough money for food? No   Do you have housing? (Housing is defined as stable permanent housing and does not include staying ouside in a car, in a tent, in an abandoned building, in an overnight shelter, or couch-surfing.) Yes   Are you worried about losing your housing? No   Lack of transportation? No    Unable to get utilities (heat,electricity)? No            8/28/2024   Dental   Dentist two times every year? (!) NO            8/28/2024   TB Screening   Were you born outside of the [place]? No          Today's PHQ-9 Score:       8/28/2024     2:28 PM   PHQ-9 SCORE   PHQ-9 Total Score MyChart 11 (Moderate depression)   PHQ-9 Total Score 11         8/28/2024   Substance Use   Alcohol more than 3/day or more than 7/wk No   Do you use any other substances recreationally? (!) DECLINE        Social History     Tobacco Use    Smoking status: Never    Smokeless tobacco: Never   Vaping Use    Vaping status: Never Used   Substance Use Topics    Alcohol use: Yes    Drug use: Never     Types: Marijuana     Comment: tried a few times in college           8/16/2023   LAST FHS-7 RESULTS   1st degree relative breast or ovarian cancer No   Any relative bilateral breast cancer Unknown   Any male have breast cancer No   Any ONE woman have BOTH breast AND ovarian cancer Yes   Any woman with breast cancer before 50yrs Unknown   2 or more relatives with breast AND/OR ovarian cancer Unknown   2 or more relatives with breast AND/OR bowel cancer Unknown           Mammogram Screening - Patient under 40 years of age: Routine Mammogram Screening not recommended.         8/28/2024   STI Screening   New sexual partner(s) since last STI/HIV test? (!) YES         History of abnormal Pap smear: No - age 30- 64 PAP with HPV every 5 years recommended        9/22/2021     3:48 PM   PAP / HPV   PAP Negative for Intraepithelial Lesion or Malignancy (NILM)            8/28/2024   Contraception/Family Planning   Questions about contraception or family planning No           Reviewed and updated as needed this visit by Provider                    Past Medical History:   Diagnosis Date    Anxiety 10/17/2019    Depressive disorder     ALICE (generalized anxiety disorder) 09/22/2020    Moderate episode of recurrent major depressive disorder (H) 09/22/2020    Panic  disorder      Past Surgical History:   Procedure Laterality Date    ABDOMEN SURGERY  2/15/14         SECTION      CONTRACEPT IUD      OB/gyn    HC TOOTH EXTRACTION W/FORCEP  2016     Lab work is in process  Labs reviewed in EPIC  BP Readings from Last 3 Encounters:   24 116/77   24 121/77   23 124/86    Wt Readings from Last 3 Encounters:   24 75.9 kg (167 lb 6.4 oz)   24 75.8 kg (167 lb 3.2 oz)   23 73.9 kg (163 lb)                  Patient Active Problem List   Diagnosis    Moderate episode of recurrent major depressive disorder (H)    ACP (advance care planning)    ALICE (generalized anxiety disorder)    Panic disorder without agoraphobia    Bipolar 2 disorder (H)    PTSD (post-traumatic stress disorder)     Past Surgical History:   Procedure Laterality Date    ABDOMEN SURGERY  2/15/14         SECTION      CONTRACEPT IUD      OB/gyn    HC TOOTH EXTRACTION W/FORCEP  2016       Social History     Tobacco Use    Smoking status: Never    Smokeless tobacco: Never   Substance Use Topics    Alcohol use: Yes     Comment: 3 drinks per week     Family History   Problem Relation Age of Onset    Bipolar Disorder Mother     Diabetes Mother     Depression Father     Alcoholism Father     Cerebrovascular Disease Maternal Grandmother     Breast Cancer Maternal Aunt 40    Colon Cancer No family hx of          Current Outpatient Medications   Medication Sig Dispense Refill    aluminum chloride (DRYSOL) 20 % external solution Apply topically At Bedtime 60 mL 3    buPROPion (WELLBUTRIN SR) 100 MG 12 hr tablet Take 1 tablet (100 mg) by mouth 2 times daily 180 tablet 3    busPIRone (BUSPAR) 10 MG tablet Take 2 tablets (20 mg) by mouth 2 times daily 360 tablet 3    Cholecalciferol (VITAMIN D-3 PO)       Fexofenadine HCl (ALLERGY 24-HR PO)       hydrOXYzine tomas (VISTARIL) 25 MG capsule Take 1-2 capsules (25-50 mg) by mouth nightly as needed (sleep) 180  "capsule 3    lamoTRIgine (LAMICTAL) 150 MG tablet Take 1 tablet (150 mg) by mouth daily 90 tablet 1    levonorgestrel (MIRENA) 20 MCG/24HR IUD 1 Device by Intrauterine route      multivitamin w/minerals (MULTI-VITAMIN) tablet Take 1 tablet by mouth daily       No Known Allergies  Recent Labs   Lab Test 08/16/23  1450 09/22/21  1641 04/11/20  2139   A1C 5.2  --   --    ALT 27 43  --    CR 0.92 0.79 0.74   GFRESTIMATED 85 >90 >90   GFRESTBLACK  --   --  >90   POTASSIUM 4.7 4.1 4.1   TSH  --  0.74  --              Objective    Exam  /77 (BP Location: Right arm, Patient Position: Sitting, Cuff Size: Adult Regular)   Pulse 79   Temp 98.3  F (36.8  C) (Oral)   Resp 17   Ht 1.613 m (5' 3.5\")   Wt 75.9 kg (167 lb 6.4 oz)   SpO2 99%   BMI 29.19 kg/m     Estimated body mass index is 29.19 kg/m  as calculated from the following:    Height as of this encounter: 1.613 m (5' 3.5\").    Weight as of this encounter: 75.9 kg (167 lb 6.4 oz).    Physical Exam  GENERAL: alert and no distress  EYES: Eyes grossly normal to inspection, PERRL and conjunctivae and sclerae normal  HENT: ear canals and TM's normal, nose and mouth without ulcers or lesions  NECK: no adenopathy, no asymmetry, masses, or scars  RESP: lungs clear to auscultation - no rales, rhonchi or wheezes  BREAST: normal without masses, tenderness or nipple discharge and no palpable axillary masses or adenopathy  CV: regular rate and rhythm, normal S1 S2, no S3 or S4, no murmur, click or rub, no peripheral edema  ABDOMEN: bowel sounds normal   (female): normal female external genitalia, normal urethral meatus, normal vaginal mucosa  MS: no gross musculoskeletal defects noted, no edema  SKIN: no suspicious lesions or rashes  NEURO: Normal strength and tone, mentation intact and speech normal  PSYCH: mentation appears normal, affect normal/bright        Signed Electronically by: Vanita Shea MD    Answers submitted by the patient for this visit:  Patient " Health Questionnaire (Submitted on 8/28/2024)  If you checked off any problems, how difficult have these problems made it for you to do your work, take care of things at home, or get along with other people?: Somewhat difficult  PHQ9 TOTAL SCORE: 11  Patient Health Questionnaire (G7) (Submitted on 8/28/2024)  ALICE 7 TOTAL SCORE: 10

## 2024-08-28 NOTE — PATIENT INSTRUCTIONS
Patient Education   Preventive Care Advice   This is general advice given by our system to help you stay healthy. However, your care team may have specific advice just for you. Please talk to your care team about your preventive care needs.  Nutrition  Eat 5 or more servings of fruits and vegetables each day.  Try wheat bread, brown rice and whole grain pasta (instead of white bread, rice, and pasta).  Get enough calcium and vitamin D. Check the label on foods and aim for 100% of the RDA (recommended daily allowance).  Lifestyle  Exercise at least 150 minutes each week  (30 minutes a day, 5 days a week).  Do muscle strengthening activities 2 days a week. These help control your weight and prevent disease.  No smoking.  Wear sunscreen to prevent skin cancer.  Have a dental exam and cleaning every 6 months.  Yearly exams  See your health care team every year to talk about:  Any changes in your health.  Any medicines your care team has prescribed.  Preventive care, family planning, and ways to prevent chronic diseases.  Shots (vaccines)   HPV shots (up to age 26), if you've never had them before.  Hepatitis B shots (up to age 59), if you've never had them before.  COVID-19 shot: Get this shot when it's due.  Flu shot: Get a flu shot every year.  Tetanus shot: Get a tetanus shot every 10 years.  Pneumococcal, hepatitis A, and RSV shots: Ask your care team if you need these based on your risk.  Shingles shot (for age 50 and up)  General health tests  Diabetes screening:  Starting at age 35, Get screened for diabetes at least every 3 years.  If you are younger than age 35, ask your care team if you should be screened for diabetes.  Cholesterol test: At age 39, start having a cholesterol test every 5 years, or more often if advised.  Bone density scan (DEXA): At age 50, ask your care team if you should have this scan for osteoporosis (brittle bones).  Hepatitis C: Get tested at least once in your life.  STIs (sexually  transmitted infections)  Before age 24: Ask your care team if you should be screened for STIs.  After age 24: Get screened for STIs if you're at risk. You are at risk for STIs (including HIV) if:  You are sexually active with more than one person.  You don't use condoms every time.  You or a partner was diagnosed with a sexually transmitted infection.  If you are at risk for HIV, ask about PrEP medicine to prevent HIV.  Get tested for HIV at least once in your life, whether you are at risk for HIV or not.  Cancer screening tests  Cervical cancer screening: If you have a cervix, begin getting regular cervical cancer screening tests starting at age 21.  Breast cancer scan (mammogram): If you've ever had breasts, begin having regular mammograms starting at age 40. This is a scan to check for breast cancer.  Colon cancer screening: It is important to start screening for colon cancer at age 45.  Have a colonoscopy test every 10 years (or more often if you're at risk) Or, ask your provider about stool tests like a FIT test every year or Cologuard test every 3 years.  To learn more about your testing options, visit:   .  For help making a decision, visit:   https://bit.ly/du13104.  Prostate cancer screening test: If you have a prostate, ask your care team if a prostate cancer screening test (PSA) at age 55 is right for you.  Lung cancer screening: If you are a current or former smoker ages 50 to 80, ask your care team if ongoing lung cancer screenings are right for you.  For informational purposes only. Not to replace the advice of your health care provider. Copyright   2023 [facility]. All rights reserved. Clinically reviewed by the [facility]. [employer] [id number] - REV 01/24.  Learning About Stress  What is stress?     Stress is your body's response to a hard situation. Your body can have a physical, emotional, or mental response. Stress is a fact of life for most people, and it  affects everyone differently. What causes stress for you may not be stressful for someone else.  A lot of things can cause stress. You may feel stress when you go on a job interview, take a test, or run a race. This kind of short-term stress is normal and even useful. It can help you if you need to work hard or react quickly. For example, stress can help you finish an important job on time.  Long-term stress is caused by ongoing stressful situations or events. Examples of long-term stress include long-term health problems, ongoing problems at work, or conflicts in your family. Long-term stress can harm your health.  How does stress affect your health?  When you are stressed, your body responds as though you are in danger. It makes hormones that speed up your heart, make you breathe faster, and give you a burst of energy. This is called the fight-or-flight stress response. If the stress is over quickly, your body goes back to normal and no harm is done.  But if stress happens too often or lasts too long, it can have bad effects. Long-term stress can make you more likely to get sick, and it can make symptoms of some diseases worse. If you tense up when you are stressed, you may develop neck, shoulder, or low back pain. Stress is linked to high blood pressure and heart disease.  Stress also harms your emotional health. It can make you light, tense, or depressed. Your relationships may suffer, and you may not do well at work or school.  What can you do to manage stress?  You can try these things to help manage stress:   Do something active. Exercise or activity can help reduce stress. Walking is a great way to get started. Even everyday activities such as housecleaning or yard work can help.  Try yoga or ernesto chi. These techniques combine exercise and meditation. You may need some training at first to learn them.  Do something you enjoy. For example, listen to music or go to a movie. Practice your hobby or do volunteer  "work.  Meditate. This can help you relax, because you are not worrying about what happened before or what may happen in the future.  Do guided imagery. Imagine yourself in any setting that helps you feel calm. You can use online videos, books, or a teacher to guide you.  Do breathing exercises. For example:  From a standing position, bend forward from the waist with your knees slightly bent. Let your arms dangle close to the floor.  Breathe in slowly and deeply as you return to a standing position. Roll up slowly and lift your head last.  Hold your breath for just a few seconds in the standing position.  Breathe out slowly and bend forward from the waist.  Let your feelings out. Talk, laugh, cry, and express anger when you need to. Talking with supportive friends or family, a counselor, or a luciano leader about your feelings is a healthy way to relieve stress. Avoid discussing your feelings with people who make you feel worse.  Write. It may help to write about things that are bothering you. This helps you find out how much stress you feel and what is causing it. When you know this, you can find better ways to cope.  What can you do to prevent stress?  You might try some of these things to help prevent stress:  Manage your time. This helps you find time to do the things you want and need to do.  Get enough sleep. Your body recovers from the stresses of the day while you are sleeping.  Get support. Your family, friends, and community can make a difference in how you experience stress.  Limit your news feed. Avoid or limit time on social media or news that may make you feel stressed.  Do something active. Exercise or activity can help reduce stress. Walking is a great way to get started.  Where can you learn more?  Go to https://www.healthwise.net/patiented  Enter N032 in the search box to learn more about \"Learning About Stress.\"  Current as of: October 24, 2023               Content Version: 14.0    7240-9128 " [employer].   Care instructions adapted under license by your healthcare professional. If you have questions about a medical condition or this instruction, always ask your healthcare professional. [employer] disclaims any warranty or liability for your use of this information.      Learning About Depression Screening  What is depression screening?  Depression screening is a way to see if you have depression symptoms. It may be done by a doctor or counselor. It's often part of a routine checkup. That's because your mental health is just as important as your physical health.  Depression is a mental health condition that affects how you feel, think, and act. You may:  Have less energy.  Lose interest in your daily activities.  Feel sad and grouchy for a long time.  Depression is very common. It affects people of all ages.  Many things can lead to depression. Some people become depressed after they have a stroke or find out they have a major illness like cancer or heart disease. The death of a loved one or a breakup may lead to depression. It can run in families. Most experts believe that a combination of inherited genes and stressful life events can cause it.  What happens during screening?  You may be asked to fill out a form about your depression symptoms. You and the doctor will discuss your answers. The doctor may ask you more questions to learn more about how you think, act, and feel.  What happens after screening?  If you have symptoms of depression, your doctor will talk to you about your options.  Doctors usually treat depression with medicines or counseling. Often, combining the two works best. Many people don't get help because they think that they'll get over the depression on their own. But people with depression may not get better unless they get treatment.  The cause of depression is not well understood. There may be many factors involved. But if you have depression, it's not  "your fault.  A serious symptom of depression is thinking about death or suicide. If you or someone you care about talks about this or about feeling hopeless, get help right away.  It's important to know that depression can be treated. Medicine, counseling, and self-care may help.  Where can you learn more?  Go to https://www.[employer]/patiented  Enter T185 in the search box to learn more about \"Learning About Depression Screening.\"  Current as of: June 24, 2023  Content Version: 14.1 2006-2024 [employer].   Care instructions adapted under license by your healthcare professional. If you have questions about a medical condition or this instruction, always ask your healthcare professional. [employer] disclaims any warranty or liability for your use of this information.    Safer Sex: Care Instructions  Overview  Safer sex is a way to reduce your risk of getting a sexually transmitted infection (STI). It can also help prevent pregnancy.  Several products can help you practice safer sex and reduce your chance of STIs. One of the best is a condom. There are internal and external condoms. You can use a special rubber sheet (dental dam) for protection during oral sex. Disposable gloves can keep your hands from touching blood, semen, or other body fluids that can carry infections.  Remember that birth control methods such as diaphragms, IUDs, foams, and birth control pills do not stop you from getting STIs.  Follow-up care is a key part of your treatment and safety. Be sure to make and go to all appointments, and call your doctor if you are having problems. It's also a good idea to know your test results and keep a list of the medicines you take.  How can you care for yourself at home?  Think about getting vaccinated to help prevent hepatitis A, hepatitis B, and human papillomavirus (HPV). They can be spread through sex.  Use a condom every time you have sex. Use an external condom, which " "goes on the penis. Or use an internal condom, which goes into the vagina or anus.  Make sure you use the right size external condom. A condom that's too small can break easily. A condom that's too big can slip off during sex.  Use a new condom each time you have sex. Be careful not to poke a hole in the condom when you open the wrapper.  Don't use an internal condom and an external condom at the same time.  Never use petroleum jelly (such as Vaseline), grease, hand lotion, baby oil, or anything with oil in it. These products can make holes in the condom.  After intercourse, hold the edge of the condom as you remove it. This will help keep semen from spilling out of the condom.  Do not have sex with anyone who has symptoms of an STI, such as sores on the genitals or mouth.  Do not drink a lot of alcohol or use drugs before sex.  Limit your sex partners. Sex with one partner who has sex only with you can reduce your risk of getting an STI.  Don't share sex toys. But if you do share them, use a condom and clean the sex toys between each use.  Talk to any partners before you have sex. Talk about what you feel comfortable with and whether you have any boundaries with sex. And find out if your partner or partners may be at risk for any STI. Keep in mind that a person may be able to spread an STI even if they do not have symptoms. You and any partners may want to get tested for STIs.  Where can you learn more?  Go to https://www.[employer].net/patiented  Enter B608 in the search box to learn more about \"Safer Sex: Care Instructions.\"  Current as of: November 27, 2023               Content Version: 14.0    7846-8906 [employer].   Care instructions adapted under license by your healthcare professional. If you have questions about a medical condition or this instruction, always ask your healthcare professional. [employer] disclaims any warranty or liability for your use of this information.       "

## 2024-08-29 LAB
HPV HR 12 DNA CVX QL NAA+PROBE: NEGATIVE
HPV16 DNA CVX QL NAA+PROBE: NEGATIVE
HPV18 DNA CVX QL NAA+PROBE: NEGATIVE
HUMAN PAPILLOMA VIRUS FINAL DIAGNOSIS: NORMAL

## 2024-09-04 LAB
BKR AP ASSOCIATED HPV REPORT: NORMAL
BKR LAB AP GYN ADEQUACY: NORMAL
BKR LAB AP GYN INTERPRETATION: NORMAL
BKR LAB AP PREVIOUS ABNORMAL: NORMAL
PATH REPORT.COMMENTS IMP SPEC: NORMAL
PATH REPORT.COMMENTS IMP SPEC: NORMAL
PATH REPORT.RELEVANT HX SPEC: NORMAL

## 2024-10-20 ENCOUNTER — OFFICE VISIT (OUTPATIENT)
Dept: FAMILY MEDICINE | Facility: CLINIC | Age: 29
End: 2024-10-20
Payer: COMMERCIAL

## 2024-10-20 VITALS
SYSTOLIC BLOOD PRESSURE: 120 MMHG | TEMPERATURE: 98.2 F | HEART RATE: 87 BPM | DIASTOLIC BLOOD PRESSURE: 80 MMHG | OXYGEN SATURATION: 100 % | RESPIRATION RATE: 16 BRPM

## 2024-10-20 DIAGNOSIS — R30.0 DYSURIA: Primary | ICD-10-CM

## 2024-10-20 LAB
ALBUMIN UR-MCNC: NEGATIVE MG/DL
APPEARANCE UR: ABNORMAL
BACTERIA #/AREA URNS HPF: ABNORMAL /HPF
BILIRUB UR QL STRIP: NEGATIVE
COLOR UR AUTO: ABNORMAL
GLUCOSE UR STRIP-MCNC: NEGATIVE MG/DL
HGB UR QL STRIP: ABNORMAL
KETONES UR STRIP-MCNC: NEGATIVE MG/DL
LEUKOCYTE ESTERASE UR QL STRIP: ABNORMAL
NITRATE UR QL: NEGATIVE
PH UR STRIP: 7 [PH] (ref 5–7)
RBC #/AREA URNS AUTO: ABNORMAL /HPF
SP GR UR STRIP: 1.01 (ref 1–1.03)
SQUAMOUS #/AREA URNS AUTO: ABNORMAL /LPF
UROBILINOGEN UR STRIP-ACNC: 0.2 E.U./DL
WBC #/AREA URNS AUTO: >100 /HPF
WBC CLUMPS #/AREA URNS HPF: PRESENT /HPF

## 2024-10-20 PROCEDURE — 87086 URINE CULTURE/COLONY COUNT: CPT | Performed by: FAMILY MEDICINE

## 2024-10-20 PROCEDURE — 81001 URINALYSIS AUTO W/SCOPE: CPT | Performed by: FAMILY MEDICINE

## 2024-10-20 PROCEDURE — 99214 OFFICE O/P EST MOD 30 MIN: CPT

## 2024-10-20 RX ORDER — SULFAMETHOXAZOLE AND TRIMETHOPRIM 800; 160 MG/1; MG/1
1 TABLET ORAL 2 TIMES DAILY
Qty: 10 TABLET | Refills: 0 | Status: SHIPPED | OUTPATIENT
Start: 2024-10-20 | End: 2024-10-25

## 2024-10-20 NOTE — PATIENT INSTRUCTIONS
Patient Instructions     1. Increase fluid intake  2. Add probiotic to prevent GI side effects from antibiotic if prescribed.   3. Complete antibiotic regimen if prescribed. If the urine culture shows that the bacteria causing this infection is resistant to this medication, you will be notified if the treatment plan needs to be changed based on the results.   4. For the discomfort: you may take an over the counter medication called pyridium (AZO) up three times daily for up to 2 days.  5. Follow up if you have a fever of 100.4 F (38 C) or higher, no improvement after three days of treatment, trouble urinating because of pain, new or increased discharge from the vagina, rash or joint pain, and increased back or abdominal pain.

## 2024-10-20 NOTE — PROGRESS NOTES
URGENT CARE VISIT:    ASSESSMENT AND PLAN:      ICD-10-CM    1. Dysuria  R30.0 UA Macroscopic with reflex to Microscopic and Culture     UA Macroscopic with reflex to Microscopic and Culture     Urine Microscopic Exam     Urine Culture          Differential Diagnosis include but not limited to UTI, Dysuria, Pyelonephritis, Kidney Stone, etc. UA macro showing blood and moderate leukocyte.  Micro unable to be performed here and will need to be completed at another site.  With past history of pyelonephritis we will go ahead and treat patient with Bactrim twice daily for 5 days.  Discussed with patient if need of changing antibiotics from culture we will go ahead and do so and communicate with her via portal.  Reported measures outlined and discussed including increase hydration, Azo, and rest as needed.  I have asked her to go ahead and monitor back pain for worsening symptoms and if experiencing please proceed to the ER.       Red flag symptoms needing urgent evaluation discussed and printed off.    Follow up with primary care provider with any problems, questions or concerns or if symptoms worsen or fail to improve. Patient verbalized understanding and is agreeable to plan. The patient was discharged ambulatory and in stable condition.         SUBJECTIVE:   Pooja High is a 29 year old female who presents today for a possible UTI. Symptoms of dysuria, urgency, frequency, and bilateral back pain have been going on for 3 day(s). Symptoms were sudden onset and moderate. Reports having history of bilateral back pain and has not worsened despite urinary symptoms.  Patient denies nausea, vomiting, fever, and chills, kidney stone history, or vaginal symptoms. This patient does have a history of urinary tract infections last treated 10/3/23 Bactrim.  Hx of Pyelonephritis in 2020.       OTC: Azo    LMP: IUD     PMH:   Past Medical History:   Diagnosis Date    Anxiety 10/17/2019    Depressive disorder     ALICE (generalized  anxiety disorder) 09/22/2020    Moderate episode of recurrent major depressive disorder (H) 09/22/2020    Panic disorder      Allergies: Patient has no known allergies.   Medications:   Current Outpatient Medications   Medication Sig Dispense Refill    aluminum chloride (DRYSOL) 20 % external solution Apply topically At Bedtime 60 mL 3    buPROPion (WELLBUTRIN SR) 100 MG 12 hr tablet Take 1 tablet (100 mg) by mouth 2 times daily 180 tablet 3    busPIRone (BUSPAR) 10 MG tablet Take 2 tablets (20 mg) by mouth 2 times daily 360 tablet 3    Cholecalciferol (VITAMIN D-3 PO)       Fexofenadine HCl (ALLERGY 24-HR PO)       hydrOXYzine tomas (VISTARIL) 25 MG capsule Take 1-2 capsules (25-50 mg) by mouth nightly as needed (sleep) 180 capsule 3    lamoTRIgine (LAMICTAL) 150 MG tablet Take 1 tablet (150 mg) by mouth daily 90 tablet 1    levonorgestrel (MIRENA) 20 MCG/24HR IUD 1 Device by Intrauterine route      multivitamin w/minerals (MULTI-VITAMIN) tablet Take 1 tablet by mouth daily       Social History:   Social History     Tobacco Use    Smoking status: Never    Smokeless tobacco: Never   Substance Use Topics    Alcohol use: Yes     Comment: 3 drinks per week       ROS:  Review of systems negative except as stated above.    OBJECTIVE:  /80   Pulse 87   Temp 98.2  F (36.8  C) (Tympanic)   Resp 16   SpO2 100%     GENERAL APPEARANCE: healthy, alert and no distress  EYES: EOMI,  PERRL, conjunctiva clear  HENT: ear canals and TM's normal.  Nose and mouth without ulcers, erythema or lesions  NECK: supple, nontender, no lymphadenopathy  RESP: lungs clear to auscultation - no rales, rhonchi or wheezes  CV: regular rates and rhythm, normal S1 S2, no murmur noted  ABDOMEN:  soft, nontender, no HSM or masses   SKIN: no suspicious lesions or rashes  Back: negative CVA tenderness    Labs:      Results for orders placed or performed in visit on 10/20/24   UA Macroscopic with reflex to Microscopic and Culture     Status:  Abnormal    Specimen: Urine, Midstream   Result Value Ref Range    Color Urine Light Yellow Colorless, Straw, Light Yellow, Yellow    Appearance Urine Cloudy (A) Clear    Glucose Urine Negative Negative mg/dL    Bilirubin Urine Negative Negative    Ketones Urine Negative Negative mg/dL    Specific Gravity Urine 1.010 1.003 - 1.035    Blood Urine Moderate (A) Negative    pH Urine 7.0 5.0 - 7.0    Protein Albumin Urine Negative Negative mg/dL    Urobilinogen Urine 0.2 0.2, 1.0 E.U./dL    Nitrite Urine Negative Negative    Leukocyte Esterase Urine Moderate (A) Negative

## 2024-10-21 LAB — BACTERIA UR CULT: NORMAL

## 2025-04-11 ENCOUNTER — ANCILLARY PROCEDURE (OUTPATIENT)
Dept: GENERAL RADIOLOGY | Facility: CLINIC | Age: 30
End: 2025-04-11
Attending: FAMILY MEDICINE
Payer: COMMERCIAL

## 2025-04-11 DIAGNOSIS — M79.674 PAIN OF TOE OF RIGHT FOOT: ICD-10-CM

## 2025-04-11 PROCEDURE — 73660 X-RAY EXAM OF TOE(S): CPT | Mod: RT | Performed by: FAMILY MEDICINE

## 2025-04-19 ENCOUNTER — MYC MEDICAL ADVICE (OUTPATIENT)
Dept: FAMILY MEDICINE | Facility: CLINIC | Age: 30
End: 2025-04-19
Payer: COMMERCIAL

## 2025-04-21 NOTE — TELEPHONE ENCOUNTER
See [employer] message.     Replied via [employer].     Randi THOMAS RN   Clinic RN  [facility]

## 2025-04-29 DIAGNOSIS — F31.81 BIPOLAR II DISORDER (H): ICD-10-CM

## 2025-04-29 RX ORDER — BUPROPION HYDROCHLORIDE 100 MG/1
100 TABLET, EXTENDED RELEASE ORAL 2 TIMES DAILY
Qty: 180 TABLET | Refills: 0 | Status: SHIPPED | OUTPATIENT
Start: 2025-04-29

## 2025-05-02 ENCOUNTER — ANCILLARY PROCEDURE (OUTPATIENT)
Dept: GENERAL RADIOLOGY | Facility: CLINIC | Age: 30
End: 2025-05-02
Attending: FAMILY MEDICINE
Payer: COMMERCIAL

## 2025-05-02 DIAGNOSIS — S92.524A CLOSED NONDISPLACED FRACTURE OF MIDDLE PHALANX OF LESSER TOE OF RIGHT FOOT, INITIAL ENCOUNTER: ICD-10-CM

## 2025-05-02 PROCEDURE — 73660 X-RAY EXAM OF TOE(S): CPT | Mod: RT | Performed by: FAMILY MEDICINE

## 2025-05-10 DIAGNOSIS — F41.1 GAD (GENERALIZED ANXIETY DISORDER): ICD-10-CM

## 2025-05-12 RX ORDER — BUSPIRONE HYDROCHLORIDE 10 MG/1
TABLET ORAL
Qty: 360 TABLET | Refills: 1 | Status: SHIPPED | OUTPATIENT
Start: 2025-05-12

## 2025-06-22 DIAGNOSIS — F41.1 GAD (GENERALIZED ANXIETY DISORDER): ICD-10-CM

## 2025-06-23 RX ORDER — HYDROXYZINE PAMOATE 25 MG/1
CAPSULE ORAL
Qty: 180 CAPSULE | Refills: 0 | Status: SHIPPED | OUTPATIENT
Start: 2025-06-23

## 2025-07-05 DIAGNOSIS — F31.81 BIPOLAR II DISORDER (H): ICD-10-CM

## 2025-07-07 RX ORDER — LAMOTRIGINE 150 MG/1
150 TABLET ORAL DAILY
Qty: 90 TABLET | Refills: 0 | Status: SHIPPED | OUTPATIENT
Start: 2025-07-07

## 2025-07-29 ENCOUNTER — PATIENT OUTREACH (OUTPATIENT)
Dept: CARE COORDINATION | Facility: CLINIC | Age: 30
End: 2025-07-29
Payer: COMMERCIAL